# Patient Record
Sex: FEMALE | Race: WHITE | NOT HISPANIC OR LATINO | URBAN - METROPOLITAN AREA
[De-identification: names, ages, dates, MRNs, and addresses within clinical notes are randomized per-mention and may not be internally consistent; named-entity substitution may affect disease eponyms.]

---

## 2017-05-16 ENCOUNTER — OFFICE (AMBULATORY)
Dept: URBAN - METROPOLITAN AREA CLINIC 64 | Facility: CLINIC | Age: 77
End: 2017-05-16

## 2017-05-16 VITALS
HEART RATE: 57 BPM | WEIGHT: 192 LBS | DIASTOLIC BLOOD PRESSURE: 85 MMHG | HEIGHT: 66 IN | SYSTOLIC BLOOD PRESSURE: 142 MMHG

## 2017-05-16 DIAGNOSIS — R93.3 ABNORMAL FINDINGS ON DIAGNOSTIC IMAGING OF OTHER PARTS OF DI: ICD-10-CM

## 2017-05-16 DIAGNOSIS — R13.10 DYSPHAGIA, UNSPECIFIED: ICD-10-CM

## 2017-05-16 DIAGNOSIS — K21.9 GASTRO-ESOPHAGEAL REFLUX DISEASE WITHOUT ESOPHAGITIS: ICD-10-CM

## 2017-05-16 PROCEDURE — 99214 OFFICE O/P EST MOD 30 MIN: CPT | Performed by: INTERNAL MEDICINE

## 2017-05-16 RX ORDER — OMEPRAZOLE 40 MG/1
40 CAPSULE, DELAYED RELEASE ORAL
Qty: 30 | Refills: 11 | Status: COMPLETED
Start: 2017-05-16 | End: 2018-02-16

## 2017-05-31 ENCOUNTER — ON CAMPUS - OUTPATIENT (AMBULATORY)
Dept: URBAN - METROPOLITAN AREA HOSPITAL 2 | Facility: HOSPITAL | Age: 77
End: 2017-05-31

## 2017-05-31 ENCOUNTER — HOSPITAL ENCOUNTER (OUTPATIENT)
Dept: OTHER | Facility: HOSPITAL | Age: 77
Setting detail: SPECIMEN
Discharge: HOME | End: 2017-05-31
Attending: INTERNAL MEDICINE | Admitting: INTERNAL MEDICINE

## 2017-05-31 ENCOUNTER — OFFICE (AMBULATORY)
Dept: URBAN - METROPOLITAN AREA CLINIC 64 | Facility: CLINIC | Age: 77
End: 2017-05-31

## 2017-05-31 VITALS
OXYGEN SATURATION: 98 % | SYSTOLIC BLOOD PRESSURE: 146 MMHG | SYSTOLIC BLOOD PRESSURE: 121 MMHG | OXYGEN SATURATION: 99 % | HEART RATE: 72 BPM | RESPIRATION RATE: 18 BRPM | HEART RATE: 68 BPM | HEART RATE: 60 BPM | HEIGHT: 66 IN | HEART RATE: 54 BPM | DIASTOLIC BLOOD PRESSURE: 70 MMHG | OXYGEN SATURATION: 97 % | SYSTOLIC BLOOD PRESSURE: 148 MMHG | SYSTOLIC BLOOD PRESSURE: 130 MMHG | DIASTOLIC BLOOD PRESSURE: 79 MMHG | DIASTOLIC BLOOD PRESSURE: 66 MMHG | TEMPERATURE: 97.8 F | WEIGHT: 192 LBS | RESPIRATION RATE: 16 BRPM

## 2017-05-31 DIAGNOSIS — K20.9 ESOPHAGITIS, UNSPECIFIED: ICD-10-CM

## 2017-05-31 DIAGNOSIS — K21.9 GASTRO-ESOPHAGEAL REFLUX DISEASE WITHOUT ESOPHAGITIS: ICD-10-CM

## 2017-05-31 DIAGNOSIS — K44.9 DIAPHRAGMATIC HERNIA WITHOUT OBSTRUCTION OR GANGRENE: ICD-10-CM

## 2017-05-31 DIAGNOSIS — K31.9 DISEASE OF STOMACH AND DUODENUM, UNSPECIFIED: ICD-10-CM

## 2017-05-31 DIAGNOSIS — T18.128A FOOD IN ESOPHAGUS CAUSING OTHER INJURY, INITIAL ENCOUNTER: ICD-10-CM

## 2017-05-31 DIAGNOSIS — K29.50 UNSPECIFIED CHRONIC GASTRITIS WITHOUT BLEEDING: ICD-10-CM

## 2017-05-31 DIAGNOSIS — R13.10 DYSPHAGIA, UNSPECIFIED: ICD-10-CM

## 2017-05-31 DIAGNOSIS — K29.70 GASTRITIS, UNSPECIFIED, WITHOUT BLEEDING: ICD-10-CM

## 2017-05-31 LAB
GI HISTOLOGY: A. SELECT: (no result)
GI HISTOLOGY: PDF REPORT: (no result)

## 2017-05-31 PROCEDURE — 43239 EGD BIOPSY SINGLE/MULTIPLE: CPT | Performed by: INTERNAL MEDICINE

## 2017-05-31 PROCEDURE — 88342 IMHCHEM/IMCYTCHM 1ST ANTB: CPT | Mod: 26 | Performed by: INTERNAL MEDICINE

## 2017-05-31 PROCEDURE — 88305 TISSUE EXAM BY PATHOLOGIST: CPT | Mod: 26 | Performed by: INTERNAL MEDICINE

## 2017-05-31 PROCEDURE — 43450 DILATE ESOPHAGUS 1/MULT PASS: CPT | Performed by: INTERNAL MEDICINE

## 2017-05-31 RX ADMIN — PROPOFOL: 10 INJECTION, EMULSION INTRAVENOUS at 07:38

## 2017-08-07 ENCOUNTER — OFFICE (AMBULATORY)
Dept: URBAN - METROPOLITAN AREA CLINIC 64 | Facility: CLINIC | Age: 77
End: 2017-08-07

## 2017-08-07 VITALS
HEIGHT: 66 IN | HEART RATE: 47 BPM | WEIGHT: 182 LBS | SYSTOLIC BLOOD PRESSURE: 467 MMHG | DIASTOLIC BLOOD PRESSURE: 75 MMHG

## 2017-08-07 DIAGNOSIS — K21.9 GASTRO-ESOPHAGEAL REFLUX DISEASE WITHOUT ESOPHAGITIS: ICD-10-CM

## 2017-08-07 DIAGNOSIS — R13.10 DYSPHAGIA, UNSPECIFIED: ICD-10-CM

## 2017-08-07 PROCEDURE — 99213 OFFICE O/P EST LOW 20 MIN: CPT | Performed by: INTERNAL MEDICINE

## 2017-08-07 RX ORDER — OMEPRAZOLE 20 MG/1
20 CAPSULE, DELAYED RELEASE ORAL
Qty: 30 | Refills: 11 | Status: COMPLETED
Start: 2017-08-07 | End: 2018-02-16

## 2018-02-16 ENCOUNTER — OFFICE (AMBULATORY)
Dept: URBAN - METROPOLITAN AREA CLINIC 64 | Facility: CLINIC | Age: 78
End: 2018-02-16

## 2018-02-16 VITALS
DIASTOLIC BLOOD PRESSURE: 69 MMHG | HEIGHT: 66 IN | SYSTOLIC BLOOD PRESSURE: 138 MMHG | HEART RATE: 48 BPM | WEIGHT: 186 LBS

## 2018-02-16 DIAGNOSIS — K21.9 GASTRO-ESOPHAGEAL REFLUX DISEASE WITHOUT ESOPHAGITIS: ICD-10-CM

## 2018-02-16 DIAGNOSIS — R10.9 UNSPECIFIED ABDOMINAL PAIN: ICD-10-CM

## 2018-02-16 DIAGNOSIS — R19.5 OTHER FECAL ABNORMALITIES: ICD-10-CM

## 2018-02-16 PROCEDURE — 99214 OFFICE O/P EST MOD 30 MIN: CPT | Performed by: NURSE PRACTITIONER

## 2018-02-16 RX ORDER — SUCRALFATE 1 G/10ML
800 SUSPENSION ORAL
Qty: 400 | Refills: 0 | Status: COMPLETED
Start: 2018-02-16 | End: 2018-03-05

## 2018-02-16 RX ORDER — METRONIDAZOLE 500 MG/1
1500 TABLET, FILM COATED ORAL
Qty: 30 | Refills: 0 | Status: COMPLETED
Start: 2018-02-16 | End: 2018-03-05

## 2018-03-05 ENCOUNTER — OFFICE (AMBULATORY)
Dept: URBAN - METROPOLITAN AREA CLINIC 64 | Facility: CLINIC | Age: 78
End: 2018-03-05

## 2018-03-05 VITALS
WEIGHT: 186 LBS | HEIGHT: 66 IN | DIASTOLIC BLOOD PRESSURE: 73 MMHG | HEART RATE: 50 BPM | SYSTOLIC BLOOD PRESSURE: 142 MMHG

## 2018-03-05 DIAGNOSIS — K21.9 GASTRO-ESOPHAGEAL REFLUX DISEASE WITHOUT ESOPHAGITIS: ICD-10-CM

## 2018-03-05 DIAGNOSIS — R10.9 UNSPECIFIED ABDOMINAL PAIN: ICD-10-CM

## 2018-03-05 PROCEDURE — 99213 OFFICE O/P EST LOW 20 MIN: CPT | Performed by: NURSE PRACTITIONER

## 2018-03-05 RX ORDER — SUCRALFATE 1 G/1
4 TABLET ORAL
Qty: 28 | Refills: 11 | Status: COMPLETED
Start: 2018-02-16 | End: 2018-03-23

## 2018-03-05 RX ORDER — METRONIDAZOLE 500 MG/1
1500 TABLET, FILM COATED ORAL
Qty: 30 | Refills: 0 | Status: COMPLETED
Start: 2018-03-05 | End: 2018-04-04

## 2018-04-04 ENCOUNTER — OFFICE (AMBULATORY)
Dept: URBAN - METROPOLITAN AREA CLINIC 64 | Facility: CLINIC | Age: 78
End: 2018-04-04

## 2018-04-04 VITALS
DIASTOLIC BLOOD PRESSURE: 61 MMHG | WEIGHT: 186 LBS | HEIGHT: 66 IN | SYSTOLIC BLOOD PRESSURE: 128 MMHG | HEART RATE: 55 BPM

## 2018-04-04 DIAGNOSIS — R93.3 ABNORMAL FINDINGS ON DIAGNOSTIC IMAGING OF OTHER PARTS OF DI: ICD-10-CM

## 2018-04-04 DIAGNOSIS — K57.32 DIVERTICULITIS OF LARGE INTESTINE WITHOUT PERFORATION OR ABS: ICD-10-CM

## 2018-04-04 PROCEDURE — 99213 OFFICE O/P EST LOW 20 MIN: CPT | Performed by: INTERNAL MEDICINE

## 2018-04-04 RX ORDER — AMOXICILLIN AND CLAVULANATE POTASSIUM 875; 125 MG/1; MG/1
1750 TABLET, FILM COATED ORAL
Qty: 20 | Refills: 0 | Status: COMPLETED
Start: 2018-04-04 | End: 2018-05-15

## 2018-05-16 ENCOUNTER — HOSPITAL ENCOUNTER (OUTPATIENT)
Dept: OTHER | Facility: HOSPITAL | Age: 78
Setting detail: SPECIMEN
Discharge: HOME | End: 2018-05-16
Attending: INTERNAL MEDICINE | Admitting: INTERNAL MEDICINE

## 2018-05-16 ENCOUNTER — ON CAMPUS - OUTPATIENT (AMBULATORY)
Dept: URBAN - METROPOLITAN AREA HOSPITAL 2 | Facility: HOSPITAL | Age: 78
End: 2018-05-16

## 2018-05-16 ENCOUNTER — OFFICE (AMBULATORY)
Dept: URBAN - METROPOLITAN AREA PATHOLOGY 4 | Facility: PATHOLOGY | Age: 78
End: 2018-05-16

## 2018-05-16 VITALS
DIASTOLIC BLOOD PRESSURE: 67 MMHG | RESPIRATION RATE: 18 BRPM | OXYGEN SATURATION: 98 % | HEART RATE: 74 BPM | SYSTOLIC BLOOD PRESSURE: 135 MMHG | TEMPERATURE: 97.2 F | DIASTOLIC BLOOD PRESSURE: 83 MMHG | HEART RATE: 66 BPM | HEIGHT: 66 IN | HEART RATE: 60 BPM | OXYGEN SATURATION: 100 % | WEIGHT: 180 LBS | OXYGEN SATURATION: 93 % | RESPIRATION RATE: 14 BRPM | OXYGEN SATURATION: 96 % | SYSTOLIC BLOOD PRESSURE: 182 MMHG | DIASTOLIC BLOOD PRESSURE: 74 MMHG | RESPIRATION RATE: 16 BRPM | DIASTOLIC BLOOD PRESSURE: 78 MMHG | HEART RATE: 54 BPM | RESPIRATION RATE: 19 BRPM | DIASTOLIC BLOOD PRESSURE: 91 MMHG | HEART RATE: 46 BPM | SYSTOLIC BLOOD PRESSURE: 139 MMHG | SYSTOLIC BLOOD PRESSURE: 126 MMHG | SYSTOLIC BLOOD PRESSURE: 188 MMHG

## 2018-05-16 DIAGNOSIS — K29.50 UNSPECIFIED CHRONIC GASTRITIS WITHOUT BLEEDING: ICD-10-CM

## 2018-05-16 DIAGNOSIS — R93.3 ABNORMAL FINDINGS ON DIAGNOSTIC IMAGING OF OTHER PARTS OF DI: ICD-10-CM

## 2018-05-16 DIAGNOSIS — K20.8 OTHER ESOPHAGITIS: ICD-10-CM

## 2018-05-16 DIAGNOSIS — K29.80 DUODENITIS WITHOUT BLEEDING: ICD-10-CM

## 2018-05-16 DIAGNOSIS — K44.9 DIAPHRAGMATIC HERNIA WITHOUT OBSTRUCTION OR GANGRENE: ICD-10-CM

## 2018-05-16 DIAGNOSIS — K29.71 GASTRITIS, UNSPECIFIED, WITH BLEEDING: ICD-10-CM

## 2018-05-16 LAB
GI HISTOLOGY: A. SELECT: (no result)
GI HISTOLOGY: PDF REPORT: (no result)

## 2018-05-16 PROCEDURE — 88342 IMHCHEM/IMCYTCHM 1ST ANTB: CPT | Mod: 26 | Performed by: INTERNAL MEDICINE

## 2018-05-16 PROCEDURE — 88305 TISSUE EXAM BY PATHOLOGIST: CPT | Mod: 26 | Performed by: INTERNAL MEDICINE

## 2018-05-16 PROCEDURE — 43239 EGD BIOPSY SINGLE/MULTIPLE: CPT | Performed by: INTERNAL MEDICINE

## 2018-05-16 RX ORDER — OMEPRAZOLE 20 MG/1
CAPSULE, DELAYED RELEASE ORAL
Qty: 150 | Refills: 1 | Status: COMPLETED
End: 2019-10-21

## 2018-05-16 RX ADMIN — PROPOFOL: 10 INJECTION, EMULSION INTRAVENOUS at 14:29

## 2018-06-03 ENCOUNTER — ON CAMPUS - OUTPATIENT (AMBULATORY)
Dept: URBAN - METROPOLITAN AREA HOSPITAL 85 | Facility: HOSPITAL | Age: 78
End: 2018-06-03

## 2018-06-03 DIAGNOSIS — K59.00 CONSTIPATION, UNSPECIFIED: ICD-10-CM

## 2018-06-03 DIAGNOSIS — K57.32 DIVERTICULITIS OF LARGE INTESTINE WITHOUT PERFORATION OR ABS: ICD-10-CM

## 2018-06-03 DIAGNOSIS — R10.32 LEFT LOWER QUADRANT PAIN: ICD-10-CM

## 2018-06-03 DIAGNOSIS — K21.9 GASTRO-ESOPHAGEAL REFLUX DISEASE WITHOUT ESOPHAGITIS: ICD-10-CM

## 2018-06-03 DIAGNOSIS — K44.9 DIAPHRAGMATIC HERNIA WITHOUT OBSTRUCTION OR GANGRENE: ICD-10-CM

## 2018-06-03 PROCEDURE — 99214 OFFICE O/P EST MOD 30 MIN: CPT | Performed by: NURSE PRACTITIONER

## 2018-06-06 ENCOUNTER — OFFICE (AMBULATORY)
Dept: URBAN - METROPOLITAN AREA CLINIC 64 | Facility: CLINIC | Age: 78
End: 2018-06-06

## 2018-06-06 VITALS
HEIGHT: 66 IN | SYSTOLIC BLOOD PRESSURE: 161 MMHG | HEART RATE: 54 BPM | DIASTOLIC BLOOD PRESSURE: 81 MMHG | WEIGHT: 183 LBS

## 2018-06-06 DIAGNOSIS — K57.32 DIVERTICULITIS OF LARGE INTESTINE WITHOUT PERFORATION OR ABS: ICD-10-CM

## 2018-06-06 DIAGNOSIS — R10.32 LEFT LOWER QUADRANT PAIN: ICD-10-CM

## 2018-06-06 DIAGNOSIS — R11.0 NAUSEA: ICD-10-CM

## 2018-06-06 DIAGNOSIS — A04.72 ENTEROCOLITIS DUE TO CLOSTRIDIUM DIFFICILE, NOT SPECIFIED AS: ICD-10-CM

## 2018-06-06 PROCEDURE — 99214 OFFICE O/P EST MOD 30 MIN: CPT | Performed by: NURSE PRACTITIONER

## 2018-06-06 RX ORDER — ONDANSETRON HYDROCHLORIDE 4 MG/1
16 TABLET, ORALLY DISINTEGRATING ORAL
Qty: 45 | Refills: 0 | Status: COMPLETED
Start: 2018-06-06 | End: 2019-10-21

## 2018-07-16 ENCOUNTER — OFFICE (AMBULATORY)
Dept: URBAN - METROPOLITAN AREA CLINIC 64 | Facility: CLINIC | Age: 78
End: 2018-07-16

## 2018-07-16 VITALS
SYSTOLIC BLOOD PRESSURE: 187 MMHG | WEIGHT: 183 LBS | HEIGHT: 66 IN | DIASTOLIC BLOOD PRESSURE: 70 MMHG | HEART RATE: 50 BPM

## 2018-07-16 DIAGNOSIS — A04.72 ENTEROCOLITIS DUE TO CLOSTRIDIUM DIFFICILE, NOT SPECIFIED AS: ICD-10-CM

## 2018-07-16 DIAGNOSIS — K21.9 GASTRO-ESOPHAGEAL REFLUX DISEASE WITHOUT ESOPHAGITIS: ICD-10-CM

## 2018-07-16 DIAGNOSIS — K57.32 DIVERTICULITIS OF LARGE INTESTINE WITHOUT PERFORATION OR ABS: ICD-10-CM

## 2018-07-16 PROCEDURE — 99214 OFFICE O/P EST MOD 30 MIN: CPT | Performed by: NURSE PRACTITIONER

## 2018-09-17 ENCOUNTER — OFFICE (AMBULATORY)
Dept: URBAN - METROPOLITAN AREA PATHOLOGY 4 | Facility: PATHOLOGY | Age: 78
End: 2018-09-17

## 2018-09-17 ENCOUNTER — ON CAMPUS - OUTPATIENT (AMBULATORY)
Dept: URBAN - METROPOLITAN AREA HOSPITAL 2 | Facility: HOSPITAL | Age: 78
End: 2018-09-17

## 2018-09-17 ENCOUNTER — HOSPITAL ENCOUNTER (OUTPATIENT)
Dept: OTHER | Facility: HOSPITAL | Age: 78
Setting detail: SPECIMEN
Discharge: HOME | End: 2018-09-17
Attending: INTERNAL MEDICINE | Admitting: INTERNAL MEDICINE

## 2018-09-17 VITALS
SYSTOLIC BLOOD PRESSURE: 165 MMHG | HEART RATE: 63 BPM | DIASTOLIC BLOOD PRESSURE: 86 MMHG | DIASTOLIC BLOOD PRESSURE: 60 MMHG | TEMPERATURE: 97.1 F | DIASTOLIC BLOOD PRESSURE: 76 MMHG | HEIGHT: 66 IN | SYSTOLIC BLOOD PRESSURE: 142 MMHG | SYSTOLIC BLOOD PRESSURE: 175 MMHG | SYSTOLIC BLOOD PRESSURE: 145 MMHG | HEART RATE: 65 BPM | OXYGEN SATURATION: 97 % | RESPIRATION RATE: 14 BRPM | DIASTOLIC BLOOD PRESSURE: 77 MMHG | DIASTOLIC BLOOD PRESSURE: 78 MMHG | HEART RATE: 59 BPM | OXYGEN SATURATION: 99 % | SYSTOLIC BLOOD PRESSURE: 141 MMHG | OXYGEN SATURATION: 100 % | HEART RATE: 46 BPM | SYSTOLIC BLOOD PRESSURE: 159 MMHG | SYSTOLIC BLOOD PRESSURE: 152 MMHG | RESPIRATION RATE: 17 BRPM | WEIGHT: 180 LBS | RESPIRATION RATE: 16 BRPM | HEART RATE: 60 BPM | SYSTOLIC BLOOD PRESSURE: 160 MMHG | RESPIRATION RATE: 18 BRPM | SYSTOLIC BLOOD PRESSURE: 158 MMHG | HEART RATE: 43 BPM | DIASTOLIC BLOOD PRESSURE: 109 MMHG | DIASTOLIC BLOOD PRESSURE: 84 MMHG | OXYGEN SATURATION: 96 % | DIASTOLIC BLOOD PRESSURE: 87 MMHG

## 2018-09-17 DIAGNOSIS — D12.5 BENIGN NEOPLASM OF SIGMOID COLON: ICD-10-CM

## 2018-09-17 DIAGNOSIS — K63.89 OTHER SPECIFIED DISEASES OF INTESTINE: ICD-10-CM

## 2018-09-17 DIAGNOSIS — K57.30 DIVERTICULOSIS OF LARGE INTESTINE WITHOUT PERFORATION OR ABS: ICD-10-CM

## 2018-09-17 LAB
GI HISTOLOGY: A. UNSPECIFIED: (no result)
GI HISTOLOGY: PDF REPORT: (no result)

## 2018-09-17 PROCEDURE — 45385 COLONOSCOPY W/LESION REMOVAL: CPT | Performed by: INTERNAL MEDICINE

## 2018-09-17 PROCEDURE — 88305 TISSUE EXAM BY PATHOLOGIST: CPT | Mod: 26 | Performed by: INTERNAL MEDICINE

## 2018-10-01 ENCOUNTER — OFFICE (AMBULATORY)
Dept: URBAN - METROPOLITAN AREA CLINIC 64 | Facility: CLINIC | Age: 78
End: 2018-10-01

## 2018-10-01 VITALS
HEART RATE: 58 BPM | DIASTOLIC BLOOD PRESSURE: 82 MMHG | HEIGHT: 66 IN | WEIGHT: 184 LBS | SYSTOLIC BLOOD PRESSURE: 155 MMHG

## 2018-10-01 DIAGNOSIS — K57.30 DIVERTICULOSIS OF LARGE INTESTINE WITHOUT PERFORATION OR ABS: ICD-10-CM

## 2018-10-01 DIAGNOSIS — K21.9 GASTRO-ESOPHAGEAL REFLUX DISEASE WITHOUT ESOPHAGITIS: ICD-10-CM

## 2018-10-01 PROCEDURE — 99213 OFFICE O/P EST LOW 20 MIN: CPT | Performed by: INTERNAL MEDICINE

## 2019-03-25 ENCOUNTER — HOSPITAL ENCOUNTER (OUTPATIENT)
Dept: LAB | Facility: HOSPITAL | Age: 79
Discharge: HOME | End: 2019-03-25
Attending: FAMILY MEDICINE | Admitting: FAMILY MEDICINE

## 2019-03-25 LAB
ALBUMIN SERPL-MCNC: 4.2 G/DL (ref 3.5–4.8)
ALBUMIN/GLOB SERPL: 1.4 {RATIO} (ref 1–1.7)
ALP SERPL-CCNC: 52 IU/L (ref 32–91)
ALT SERPL-CCNC: 24 IU/L (ref 14–54)
ANION GAP SERPL CALC-SCNC: 15.6 MMOL/L (ref 10–20)
AST SERPL-CCNC: 26 IU/L (ref 15–41)
BILIRUB SERPL-MCNC: 0.9 MG/DL (ref 0.3–1.2)
BUN SERPL-MCNC: 23 MG/DL (ref 8–20)
BUN/CREAT SERPL: 25.6 (ref 5.4–26.2)
CALCIUM SERPL-MCNC: 10.3 MG/DL (ref 8.9–10.3)
CHLORIDE SERPL-SCNC: 102 MMOL/L (ref 101–111)
CONV CO2: 23 MMOL/L (ref 22–32)
CONV TOTAL PROTEIN: 7.3 G/DL (ref 6.1–7.9)
CREAT UR-MCNC: 0.9 MG/DL (ref 0.4–1)
GLOBULIN UR ELPH-MCNC: 3.1 G/DL (ref 2.5–3.8)
GLUCOSE SERPL-MCNC: 109 MG/DL (ref 65–99)
POTASSIUM SERPL-SCNC: 4.6 MMOL/L (ref 3.6–5.1)
SODIUM SERPL-SCNC: 136 MMOL/L (ref 136–144)

## 2019-04-23 ENCOUNTER — CONVERSION ENCOUNTER (OUTPATIENT)
Dept: CARDIOLOGY | Facility: CLINIC | Age: 79
End: 2019-04-23

## 2019-06-04 VITALS
OXYGEN SATURATION: 98 % | BODY MASS INDEX: 29.21 KG/M2 | DIASTOLIC BLOOD PRESSURE: 75 MMHG | WEIGHT: 181 LBS | SYSTOLIC BLOOD PRESSURE: 164 MMHG | HEART RATE: 46 BPM

## 2019-07-01 ENCOUNTER — TELEPHONE (OUTPATIENT)
Dept: CARDIOLOGY | Facility: CLINIC | Age: 79
End: 2019-07-01

## 2019-08-05 ENCOUNTER — TRANSCRIBE ORDERS (OUTPATIENT)
Dept: ADMINISTRATIVE | Facility: HOSPITAL | Age: 79
End: 2019-08-05

## 2019-08-05 ENCOUNTER — LAB (OUTPATIENT)
Dept: LAB | Facility: HOSPITAL | Age: 79
End: 2019-08-05

## 2019-08-05 ENCOUNTER — HOSPITAL ENCOUNTER (OUTPATIENT)
Dept: CARDIOLOGY | Facility: HOSPITAL | Age: 79
Discharge: HOME | End: 2019-08-05
Admitting: SURGERY

## 2019-08-05 DIAGNOSIS — Z01.810 PRE-OPERATIVE CARDIOVASCULAR EXAMINATION: ICD-10-CM

## 2019-08-05 DIAGNOSIS — E87.8 ELECTROLYTE IMBALANCE: ICD-10-CM

## 2019-08-05 DIAGNOSIS — E87.8 ELECTROLYTE IMBALANCE: Primary | ICD-10-CM

## 2019-08-05 LAB — POTASSIUM BLD-SCNC: 4.2 MMOL/L (ref 3.6–5.1)

## 2019-08-05 PROCEDURE — 36415 COLL VENOUS BLD VENIPUNCTURE: CPT

## 2019-08-05 PROCEDURE — 84132 ASSAY OF SERUM POTASSIUM: CPT

## 2019-08-05 PROCEDURE — 93005 ELECTROCARDIOGRAM TRACING: CPT | Performed by: SURGERY

## 2019-08-10 PROCEDURE — 93010 ELECTROCARDIOGRAM REPORT: CPT | Performed by: INTERNAL MEDICINE

## 2019-09-18 ENCOUNTER — LAB (OUTPATIENT)
Dept: LAB | Facility: HOSPITAL | Age: 79
End: 2019-09-18

## 2019-09-18 ENCOUNTER — TRANSCRIBE ORDERS (OUTPATIENT)
Dept: ADMINISTRATIVE | Facility: HOSPITAL | Age: 79
End: 2019-09-18

## 2019-09-18 ENCOUNTER — HOSPITAL ENCOUNTER (OUTPATIENT)
Dept: CARDIOLOGY | Facility: HOSPITAL | Age: 79
Discharge: HOME | End: 2019-09-18
Admitting: SURGERY

## 2019-09-18 DIAGNOSIS — E87.8 ELECTROLYTE IMBALANCE: ICD-10-CM

## 2019-09-18 DIAGNOSIS — Z01.810 PRE-OPERATIVE CARDIOVASCULAR EXAMINATION: Primary | ICD-10-CM

## 2019-09-18 DIAGNOSIS — Z01.810 PRE-OPERATIVE CARDIOVASCULAR EXAMINATION: ICD-10-CM

## 2019-09-18 LAB — POTASSIUM BLD-SCNC: 4.2 MMOL/L (ref 3.6–5.1)

## 2019-09-18 PROCEDURE — 93005 ELECTROCARDIOGRAM TRACING: CPT | Performed by: SURGERY

## 2019-09-18 PROCEDURE — 84132 ASSAY OF SERUM POTASSIUM: CPT

## 2019-09-18 PROCEDURE — 36415 COLL VENOUS BLD VENIPUNCTURE: CPT

## 2019-09-25 PROCEDURE — 93010 ELECTROCARDIOGRAM REPORT: CPT | Performed by: INTERNAL MEDICINE

## 2019-10-21 ENCOUNTER — OFFICE (AMBULATORY)
Dept: URBAN - METROPOLITAN AREA CLINIC 64 | Facility: CLINIC | Age: 79
End: 2019-10-21

## 2019-10-21 VITALS
HEIGHT: 66 IN | WEIGHT: 185 LBS | DIASTOLIC BLOOD PRESSURE: 76 MMHG | HEART RATE: 50 BPM | SYSTOLIC BLOOD PRESSURE: 143 MMHG

## 2019-10-21 DIAGNOSIS — R07.9 CHEST PAIN, UNSPECIFIED: ICD-10-CM

## 2019-10-21 DIAGNOSIS — K21.9 GASTRO-ESOPHAGEAL REFLUX DISEASE WITHOUT ESOPHAGITIS: ICD-10-CM

## 2019-10-21 DIAGNOSIS — E11.9 TYPE 2 DIABETES MELLITUS WITHOUT COMPLICATIONS: ICD-10-CM

## 2019-10-21 DIAGNOSIS — I10 ESSENTIAL (PRIMARY) HYPERTENSION: ICD-10-CM

## 2019-10-21 PROCEDURE — 99213 OFFICE O/P EST LOW 20 MIN: CPT | Performed by: INTERNAL MEDICINE

## 2019-11-12 ENCOUNTER — HOSPITAL ENCOUNTER (INPATIENT)
Facility: HOSPITAL | Age: 79
LOS: 2 days | Discharge: HOME | End: 2019-11-15
Attending: EMERGENCY MEDICINE | Admitting: FAMILY MEDICINE

## 2019-11-12 ENCOUNTER — APPOINTMENT (OUTPATIENT)
Dept: CT IMAGING | Facility: HOSPITAL | Age: 79
End: 2019-11-12

## 2019-11-12 DIAGNOSIS — E87.1 HYPONATREMIA: ICD-10-CM

## 2019-11-12 DIAGNOSIS — R10.9 ABDOMINAL PAIN, UNSPECIFIED ABDOMINAL LOCATION: Primary | ICD-10-CM

## 2019-11-12 DIAGNOSIS — K57.92 DIVERTICULITIS: ICD-10-CM

## 2019-11-12 LAB
ALBUMIN SERPL-MCNC: 4 G/DL (ref 3.5–5.2)
ALBUMIN/GLOB SERPL: 1.1 G/DL
ALP SERPL-CCNC: 70 U/L (ref 39–117)
ALT SERPL W P-5'-P-CCNC: 11 U/L (ref 1–33)
ANION GAP SERPL CALCULATED.3IONS-SCNC: 13 MMOL/L (ref 5–15)
AST SERPL-CCNC: 19 U/L (ref 1–32)
BACTERIA UR QL AUTO: ABNORMAL /HPF
BASOPHILS # BLD AUTO: 0.1 10*3/MM3 (ref 0–0.2)
BASOPHILS NFR BLD AUTO: 0.6 % (ref 0–1.5)
BILIRUB SERPL-MCNC: 0.7 MG/DL (ref 0.2–1.2)
BILIRUB UR QL STRIP: NEGATIVE
BUN BLD-MCNC: 24 MG/DL (ref 8–23)
BUN/CREAT SERPL: 26.4 (ref 7–25)
CALCIUM SPEC-SCNC: 10.3 MG/DL (ref 8.6–10.5)
CHLORIDE SERPL-SCNC: 89 MMOL/L (ref 98–107)
CLARITY UR: CLEAR
CO2 SERPL-SCNC: 25 MMOL/L (ref 22–29)
COLOR UR: YELLOW
CREAT BLD-MCNC: 0.91 MG/DL (ref 0.57–1)
DEPRECATED RDW RBC AUTO: 45.5 FL (ref 37–54)
EOSINOPHIL # BLD AUTO: 0.2 10*3/MM3 (ref 0–0.4)
EOSINOPHIL NFR BLD AUTO: 1.5 % (ref 0.3–6.2)
ERYTHROCYTE [DISTWIDTH] IN BLOOD BY AUTOMATED COUNT: 13.3 % (ref 12.3–15.4)
GFR SERPL CREATININE-BSD FRML MDRD: 59 ML/MIN/1.73
GLOBULIN UR ELPH-MCNC: 3.6 GM/DL
GLUCOSE BLD-MCNC: 118 MG/DL (ref 65–99)
GLUCOSE UR STRIP-MCNC: NEGATIVE MG/DL
HCT VFR BLD AUTO: 38.6 % (ref 34–46.6)
HGB BLD-MCNC: 13.3 G/DL (ref 12–15.9)
HGB UR QL STRIP.AUTO: NEGATIVE
HOLD SPECIMEN: NORMAL
HOLD SPECIMEN: NORMAL
HYALINE CASTS UR QL AUTO: ABNORMAL /LPF
KETONES UR QL STRIP: NEGATIVE
LEUKOCYTE ESTERASE UR QL STRIP.AUTO: ABNORMAL
LIPASE SERPL-CCNC: 45 U/L (ref 13–60)
LYMPHOCYTES # BLD AUTO: 1.1 10*3/MM3 (ref 0.7–3.1)
LYMPHOCYTES NFR BLD AUTO: 9.9 % (ref 19.6–45.3)
MCH RBC QN AUTO: 32.9 PG (ref 26.6–33)
MCHC RBC AUTO-ENTMCNC: 34.3 G/DL (ref 31.5–35.7)
MCV RBC AUTO: 95.7 FL (ref 79–97)
MONOCYTES # BLD AUTO: 1 10*3/MM3 (ref 0.1–0.9)
MONOCYTES NFR BLD AUTO: 8.8 % (ref 5–12)
NEUTROPHILS # BLD AUTO: 8.6 10*3/MM3 (ref 1.7–7)
NEUTROPHILS NFR BLD AUTO: 79.2 % (ref 42.7–76)
NITRITE UR QL STRIP: NEGATIVE
NRBC BLD AUTO-RTO: 0 /100 WBC (ref 0–0.2)
PH UR STRIP.AUTO: 7 [PH] (ref 5–8)
PLATELET # BLD AUTO: 367 10*3/MM3 (ref 140–450)
PMV BLD AUTO: 8.7 FL (ref 6–12)
POTASSIUM BLD-SCNC: 4.3 MMOL/L (ref 3.5–5.2)
PROT SERPL-MCNC: 7.6 G/DL (ref 6–8.5)
PROT UR QL STRIP: NEGATIVE
RBC # BLD AUTO: 4.04 10*6/MM3 (ref 3.77–5.28)
RBC # UR: ABNORMAL /HPF
REF LAB TEST METHOD: ABNORMAL
SODIUM BLD-SCNC: 127 MMOL/L (ref 136–145)
SP GR UR STRIP: 1.01 (ref 1–1.03)
SQUAMOUS #/AREA URNS HPF: ABNORMAL /HPF
UROBILINOGEN UR QL STRIP: ABNORMAL
WBC NRBC COR # BLD: 10.9 10*3/MM3 (ref 3.4–10.8)
WBC UR QL AUTO: ABNORMAL /HPF
WHOLE BLOOD HOLD SPECIMEN: NORMAL
WHOLE BLOOD HOLD SPECIMEN: NORMAL

## 2019-11-12 PROCEDURE — 25010000002 PIPERACILLIN SOD-TAZOBACTAM PER 1 G: Performed by: EMERGENCY MEDICINE

## 2019-11-12 PROCEDURE — G0378 HOSPITAL OBSERVATION PER HR: HCPCS

## 2019-11-12 PROCEDURE — 85025 COMPLETE CBC W/AUTO DIFF WBC: CPT | Performed by: EMERGENCY MEDICINE

## 2019-11-12 PROCEDURE — 99284 EMERGENCY DEPT VISIT MOD MDM: CPT

## 2019-11-12 PROCEDURE — 81001 URINALYSIS AUTO W/SCOPE: CPT | Performed by: EMERGENCY MEDICINE

## 2019-11-12 PROCEDURE — 74176 CT ABD & PELVIS W/O CONTRAST: CPT

## 2019-11-12 PROCEDURE — 25010000002 HYDROMORPHONE PER 4 MG: Performed by: EMERGENCY MEDICINE

## 2019-11-12 PROCEDURE — 25010000002 ONDANSETRON PER 1 MG: Performed by: EMERGENCY MEDICINE

## 2019-11-12 PROCEDURE — 83690 ASSAY OF LIPASE: CPT | Performed by: EMERGENCY MEDICINE

## 2019-11-12 PROCEDURE — 80053 COMPREHEN METABOLIC PANEL: CPT | Performed by: EMERGENCY MEDICINE

## 2019-11-12 RX ORDER — HYDROMORPHONE HCL 110MG/55ML
0.5 PATIENT CONTROLLED ANALGESIA SYRINGE INTRAVENOUS ONCE
Status: COMPLETED | OUTPATIENT
Start: 2019-11-12 | End: 2019-11-12

## 2019-11-12 RX ORDER — SODIUM CHLORIDE 0.9 % (FLUSH) 0.9 %
10 SYRINGE (ML) INJECTION AS NEEDED
Status: DISCONTINUED | OUTPATIENT
Start: 2019-11-12 | End: 2019-11-15 | Stop reason: HOSPADM

## 2019-11-12 RX ORDER — ONDANSETRON 2 MG/ML
4 INJECTION INTRAMUSCULAR; INTRAVENOUS ONCE
Status: COMPLETED | OUTPATIENT
Start: 2019-11-12 | End: 2019-11-12

## 2019-11-12 RX ADMIN — HYDROMORPHONE HYDROCHLORIDE 0.5 MG: 2 INJECTION, SOLUTION INTRAMUSCULAR; INTRAVENOUS; SUBCUTANEOUS at 21:14

## 2019-11-12 RX ADMIN — ONDANSETRON 4 MG: 2 INJECTION INTRAMUSCULAR; INTRAVENOUS at 21:13

## 2019-11-12 RX ADMIN — PIPERACILLIN SODIUM,TAZOBACTAM SODIUM 3.38 G: 3; .375 INJECTION, POWDER, FOR SOLUTION INTRAVENOUS at 23:34

## 2019-11-12 RX ADMIN — SODIUM CHLORIDE 500 ML: 0.9 INJECTION, SOLUTION INTRAVENOUS at 23:35

## 2019-11-13 LAB
ANION GAP SERPL CALCULATED.3IONS-SCNC: 12 MMOL/L (ref 5–15)
BASOPHILS # BLD AUTO: 0 10*3/MM3 (ref 0–0.2)
BASOPHILS NFR BLD AUTO: 0.4 % (ref 0–1.5)
BILIRUB UR QL STRIP: NEGATIVE
BUN BLD-MCNC: 17 MG/DL (ref 8–23)
BUN/CREAT SERPL: 23.9 (ref 7–25)
CALCIUM SPEC-SCNC: 9.4 MG/DL (ref 8.6–10.5)
CHLORIDE SERPL-SCNC: 98 MMOL/L (ref 98–107)
CLARITY UR: CLEAR
CO2 SERPL-SCNC: 22 MMOL/L (ref 22–29)
COLOR UR: YELLOW
CREAT BLD-MCNC: 0.71 MG/DL (ref 0.57–1)
DEPRECATED RDW RBC AUTO: 45.1 FL (ref 37–54)
EOSINOPHIL # BLD AUTO: 0.1 10*3/MM3 (ref 0–0.4)
EOSINOPHIL NFR BLD AUTO: 1.1 % (ref 0.3–6.2)
ERYTHROCYTE [DISTWIDTH] IN BLOOD BY AUTOMATED COUNT: 13.3 % (ref 12.3–15.4)
GFR SERPL CREATININE-BSD FRML MDRD: 79 ML/MIN/1.73
GLUCOSE BLD-MCNC: 97 MG/DL (ref 65–99)
GLUCOSE UR STRIP-MCNC: NEGATIVE MG/DL
HCT VFR BLD AUTO: 34.5 % (ref 34–46.6)
HGB BLD-MCNC: 12 G/DL (ref 12–15.9)
HGB UR QL STRIP.AUTO: NEGATIVE
KETONES UR QL STRIP: NEGATIVE
LEUKOCYTE ESTERASE UR QL STRIP.AUTO: NEGATIVE
LYMPHOCYTES # BLD AUTO: 1.4 10*3/MM3 (ref 0.7–3.1)
LYMPHOCYTES NFR BLD AUTO: 13.4 % (ref 19.6–45.3)
MCH RBC QN AUTO: 33.4 PG (ref 26.6–33)
MCHC RBC AUTO-ENTMCNC: 34.6 G/DL (ref 31.5–35.7)
MCV RBC AUTO: 96.5 FL (ref 79–97)
MONOCYTES # BLD AUTO: 1.3 10*3/MM3 (ref 0.1–0.9)
MONOCYTES NFR BLD AUTO: 12.4 % (ref 5–12)
NEUTROPHILS # BLD AUTO: 7.4 10*3/MM3 (ref 1.7–7)
NEUTROPHILS NFR BLD AUTO: 72.7 % (ref 42.7–76)
NITRITE UR QL STRIP: NEGATIVE
NRBC BLD AUTO-RTO: 0.1 /100 WBC (ref 0–0.2)
PH UR STRIP.AUTO: 6.5 [PH] (ref 5–8)
PLATELET # BLD AUTO: 303 10*3/MM3 (ref 140–450)
PMV BLD AUTO: 7.9 FL (ref 6–12)
POTASSIUM BLD-SCNC: 4.1 MMOL/L (ref 3.5–5.2)
PROT UR QL STRIP: NEGATIVE
RBC # BLD AUTO: 3.58 10*6/MM3 (ref 3.77–5.28)
SODIUM BLD-SCNC: 132 MMOL/L (ref 136–145)
SP GR UR STRIP: 1.01 (ref 1–1.03)
UROBILINOGEN UR QL STRIP: NORMAL
WBC NRBC COR # BLD: 10.1 10*3/MM3 (ref 3.4–10.8)

## 2019-11-13 PROCEDURE — 25010000002 HEPARIN (PORCINE) PER 1000 UNITS: Performed by: FAMILY MEDICINE

## 2019-11-13 PROCEDURE — 80048 BASIC METABOLIC PNL TOTAL CA: CPT | Performed by: FAMILY MEDICINE

## 2019-11-13 PROCEDURE — 81003 URINALYSIS AUTO W/O SCOPE: CPT | Performed by: FAMILY MEDICINE

## 2019-11-13 PROCEDURE — 25010000002 PIPERACILLIN SOD-TAZOBACTAM PER 1 G: Performed by: FAMILY MEDICINE

## 2019-11-13 PROCEDURE — 85025 COMPLETE CBC W/AUTO DIFF WBC: CPT | Performed by: FAMILY MEDICINE

## 2019-11-13 RX ORDER — LEVOFLOXACIN 750 MG/1
750 TABLET ORAL DAILY
COMMUNITY
Start: 2019-11-12 | End: 2020-09-08

## 2019-11-13 RX ORDER — METRONIDAZOLE 500 MG/1
500 TABLET ORAL 3 TIMES DAILY
Status: DISCONTINUED | OUTPATIENT
Start: 2019-11-13 | End: 2019-11-13

## 2019-11-13 RX ORDER — POLYETHYLENE GLYCOL 3350 17 G/17G
17 POWDER, FOR SOLUTION ORAL DAILY PRN
COMMUNITY
End: 2023-03-20

## 2019-11-13 RX ORDER — MAGNESIUM SULFATE HEPTAHYDRATE 40 MG/ML
4 INJECTION, SOLUTION INTRAVENOUS AS NEEDED
Status: DISCONTINUED | OUTPATIENT
Start: 2019-11-13 | End: 2019-11-15 | Stop reason: HOSPADM

## 2019-11-13 RX ORDER — BISACODYL 10 MG
10 SUPPOSITORY, RECTAL RECTAL DAILY PRN
Status: DISCONTINUED | OUTPATIENT
Start: 2019-11-13 | End: 2019-11-15 | Stop reason: HOSPADM

## 2019-11-13 RX ORDER — HEPARIN SODIUM 5000 [USP'U]/ML
5000 INJECTION, SOLUTION INTRAVENOUS; SUBCUTANEOUS EVERY 8 HOURS SCHEDULED
Status: DISCONTINUED | OUTPATIENT
Start: 2019-11-13 | End: 2019-11-15 | Stop reason: HOSPADM

## 2019-11-13 RX ORDER — ASPIRIN 81 MG/1
81 TABLET ORAL DAILY
COMMUNITY
End: 2021-09-23

## 2019-11-13 RX ORDER — SODIUM CHLORIDE 0.9 % (FLUSH) 0.9 %
10 SYRINGE (ML) INJECTION EVERY 12 HOURS SCHEDULED
Status: DISCONTINUED | OUTPATIENT
Start: 2019-11-13 | End: 2019-11-15 | Stop reason: HOSPADM

## 2019-11-13 RX ORDER — ACETAMINOPHEN 650 MG/1
650 SUPPOSITORY RECTAL EVERY 4 HOURS PRN
Status: DISCONTINUED | OUTPATIENT
Start: 2019-11-13 | End: 2019-11-15 | Stop reason: HOSPADM

## 2019-11-13 RX ORDER — MULTIPLE VITAMINS W/ MINERALS TAB 9MG-400MCG
1 TAB ORAL DAILY
COMMUNITY
End: 2020-09-08

## 2019-11-13 RX ORDER — ATENOLOL 25 MG/1
25 TABLET ORAL EVERY 12 HOURS SCHEDULED
Status: DISCONTINUED | OUTPATIENT
Start: 2019-11-13 | End: 2019-11-15 | Stop reason: HOSPADM

## 2019-11-13 RX ORDER — MAGNESIUM SULFATE HEPTAHYDRATE 40 MG/ML
2 INJECTION, SOLUTION INTRAVENOUS AS NEEDED
Status: DISCONTINUED | OUTPATIENT
Start: 2019-11-13 | End: 2019-11-15 | Stop reason: HOSPADM

## 2019-11-13 RX ORDER — ACETAMINOPHEN 325 MG/1
650 TABLET ORAL EVERY 4 HOURS PRN
Status: DISCONTINUED | OUTPATIENT
Start: 2019-11-13 | End: 2019-11-15 | Stop reason: HOSPADM

## 2019-11-13 RX ORDER — POTASSIUM CHLORIDE 1.5 G/1.77G
40 POWDER, FOR SOLUTION ORAL AS NEEDED
Status: DISCONTINUED | OUTPATIENT
Start: 2019-11-13 | End: 2019-11-15 | Stop reason: HOSPADM

## 2019-11-13 RX ORDER — CORTISONE ACETATE 25 MG/1
25 TABLET ORAL DAILY
COMMUNITY
End: 2020-09-08

## 2019-11-13 RX ORDER — LEVOTHYROXINE SODIUM 0.05 MG/1
50 TABLET ORAL
Status: DISCONTINUED | OUTPATIENT
Start: 2019-11-13 | End: 2019-11-15 | Stop reason: HOSPADM

## 2019-11-13 RX ORDER — DOCUSATE SODIUM 100 MG/1
300 CAPSULE, LIQUID FILLED ORAL DAILY
COMMUNITY
End: 2021-01-12

## 2019-11-13 RX ORDER — LEVOTHYROXINE SODIUM 0.05 MG/1
50 TABLET ORAL NIGHTLY
COMMUNITY
End: 2023-03-26 | Stop reason: SDUPTHER

## 2019-11-13 RX ORDER — SODIUM CHLORIDE 9 MG/ML
100 INJECTION, SOLUTION INTRAVENOUS CONTINUOUS
Status: DISCONTINUED | OUTPATIENT
Start: 2019-11-13 | End: 2019-11-14

## 2019-11-13 RX ORDER — SODIUM CHLORIDE 0.9 % (FLUSH) 0.9 %
10 SYRINGE (ML) INJECTION AS NEEDED
Status: DISCONTINUED | OUTPATIENT
Start: 2019-11-13 | End: 2019-11-15 | Stop reason: HOSPADM

## 2019-11-13 RX ORDER — ASPIRIN 81 MG/1
81 TABLET ORAL DAILY
Status: DISCONTINUED | OUTPATIENT
Start: 2019-11-13 | End: 2019-11-15 | Stop reason: HOSPADM

## 2019-11-13 RX ORDER — ACETAMINOPHEN 160 MG/5ML
650 SOLUTION ORAL EVERY 4 HOURS PRN
Status: DISCONTINUED | OUTPATIENT
Start: 2019-11-13 | End: 2019-11-15 | Stop reason: HOSPADM

## 2019-11-13 RX ORDER — POTASSIUM CHLORIDE 20 MEQ/1
40 TABLET, EXTENDED RELEASE ORAL AS NEEDED
Status: DISCONTINUED | OUTPATIENT
Start: 2019-11-13 | End: 2019-11-15 | Stop reason: HOSPADM

## 2019-11-13 RX ORDER — ACEBUTOLOL HYDROCHLORIDE 200 MG/1
200 CAPSULE ORAL DAILY
COMMUNITY
End: 2023-03-20 | Stop reason: SDUPTHER

## 2019-11-13 RX ORDER — DOCUSATE SODIUM 100 MG/1
100 CAPSULE, LIQUID FILLED ORAL 2 TIMES DAILY
Status: DISCONTINUED | OUTPATIENT
Start: 2019-11-13 | End: 2019-11-15 | Stop reason: HOSPADM

## 2019-11-13 RX ORDER — LANOLIN ALCOHOL/MO/W.PET/CERES
1000 CREAM (GRAM) TOPICAL DAILY
COMMUNITY
End: 2020-09-08

## 2019-11-13 RX ORDER — DEXAMETHASONE 0.5 MG/1
0.5 TABLET ORAL
Status: DISCONTINUED | OUTPATIENT
Start: 2019-11-13 | End: 2019-11-15 | Stop reason: HOSPADM

## 2019-11-13 RX ORDER — LANOLIN ALCOHOL/MO/W.PET/CERES
1000 CREAM (GRAM) TOPICAL DAILY
Status: DISCONTINUED | OUTPATIENT
Start: 2019-11-13 | End: 2019-11-15 | Stop reason: HOSPADM

## 2019-11-13 RX ORDER — METRONIDAZOLE 500 MG/1
500 TABLET ORAL 3 TIMES DAILY
COMMUNITY
Start: 2019-11-12 | End: 2020-09-08

## 2019-11-13 RX ADMIN — ASPIRIN 81 MG: 81 TABLET, COATED ORAL at 11:00

## 2019-11-13 RX ADMIN — ATENOLOL 25 MG: 25 TABLET ORAL at 11:00

## 2019-11-13 RX ADMIN — DEXAMETHASONE 0.5 MG: 0.5 TABLET ORAL at 17:47

## 2019-11-13 RX ADMIN — SODIUM CHLORIDE 100 ML/HR: 900 INJECTION, SOLUTION INTRAVENOUS at 10:59

## 2019-11-13 RX ADMIN — LOSARTAN POTASSIUM: 50 TABLET, FILM COATED ORAL at 11:00

## 2019-11-13 RX ADMIN — HEPARIN SODIUM 5000 UNITS: 5000 INJECTION INTRAVENOUS; SUBCUTANEOUS at 15:29

## 2019-11-13 RX ADMIN — PIPERACILLIN AND TAZOBACTAM 3.38 G: 3; .375 INJECTION, POWDER, LYOPHILIZED, FOR SOLUTION INTRAVENOUS at 10:59

## 2019-11-13 RX ADMIN — DOCUSATE SODIUM 100 MG: 100 CAPSULE, LIQUID FILLED ORAL at 20:55

## 2019-11-13 RX ADMIN — CYANOCOBALAMIN TAB 1000 MCG 1000 MCG: 1000 TAB at 11:00

## 2019-11-13 RX ADMIN — HEPARIN SODIUM 5000 UNITS: 5000 INJECTION INTRAVENOUS; SUBCUTANEOUS at 21:00

## 2019-11-13 RX ADMIN — DOCUSATE SODIUM 100 MG: 100 CAPSULE, LIQUID FILLED ORAL at 11:00

## 2019-11-13 RX ADMIN — DEXAMETHASONE 0.5 MG: 0.5 TABLET ORAL at 12:33

## 2019-11-13 RX ADMIN — Medication 10 ML: at 20:55

## 2019-11-13 RX ADMIN — Medication 10 ML: at 11:01

## 2019-11-13 RX ADMIN — PIPERACILLIN AND TAZOBACTAM 3.38 G: 3; .375 INJECTION, POWDER, LYOPHILIZED, FOR SOLUTION INTRAVENOUS at 17:30

## 2019-11-13 NOTE — PROGRESS NOTES
Discharge Planning Assessment  [facility]     Patient Name: Kingsley Hunt  MRN: [id number]  Today's Date: 11/13/2019    Admit Date: 11/12/2019    Discharge Needs Assessment     Row Name 11/13/19 1246       Living Environment    Lives With  alone    Current Living Arrangements  home/apartment/condo    Duration at Residence  House in [place] but stays at times in apt in [place].     Primary Care Provided by  self    Caregiving Concerns  Plans to move in with his mother here in [place].    Family Caregiver if Needed  none    Able to Return to Prior Arrangements  yes       Resource/Environmental Concerns    Resource/Environmental Concerns  none       Transition Planning    Patient/Family Anticipates Transition to  home    Patient/Family Anticipated Services at Transition  — Pt unaware that he has insurance, Medicaid. VM left for insurance verification. Pt states may be error, his father has same name.       Discharge Needs Assessment    Readmission Within the Last 30 Days  no previous admission in last 30 days    Concerns Comments  Possible need for help with cost of DC meds if uninsured.    Equipment Currently Used at Home  nebulizer    Anticipated Changes Related to Illness  none    Equipment Needed After Discharge  — No home O2.    Discharge Coordination/Progress  Return home, may need help with cost of scripts if uninsured.         Discharge Plan     Row Name 11/13/19 1251       Plan    Plan  Return home, may need help with cost of scripts if uninsured.     Plan Comments  Pt is unaware he has insurance coverage. His father has same name - insurance verification informed per VM.                  Demographic Summary     Row Name 11/13/19 1245       General Information    Admission Type  inpatient    Arrived From  emergency department    Referral Source  admission list    Reason for Consult  discharge planning    Preferred Language  English                   Asim Wong RN

## 2019-11-13 NOTE — H&P
History and Physical      Patient Care Team:  Elin Galo MD as PCP - General    Chief complaint increasing abdominal pain    Subjective     Patient is a 79 y.o. female presents with increasing severity of abdominal pain.  She was originally seen by her primary care and was started empirically on levofloxacin and metronidazole.  She had 1 dose but her pain increased so she came the emergency department and was subsequently admitted CAT scan was positive for sigmoid diverticulitis.  When I see her this morning she has not had any nausea or vomiting since admission although she was nauseated on admission.  She not really getting hungry.  Her pain has improved to a mild/moderate currently this morning she rates it about a 4-6 out of 10.  When she was admitted was probably an 8 9 out of 10.  Patient admits that she is also constipated times as well.  She denies any fevers.  Review of Systems   All systems were reviewed and negative except for: Currently with mild to moderate crampy abdominal pain in the suprapubic into the left lower quadrant area abdominal pain.    History  Past Medical History:   Diagnosis Date   • Arthritis    • Cancer (CMS/HCC)     Skin Cancer   • Disease of thyroid gland    • Elevated cholesterol    • GERD (gastroesophageal reflux disease)    • Hypertension      Past Surgical History:   Procedure Laterality Date   • APPENDECTOMY     • COLONOSCOPY     • ENDOSCOPY     • EYE SURGERY     • SKIN BIOPSY       Family History   Problem Relation Age of Onset   • Cancer Mother    • Diabetes Sister    • Heart disease Sister    • Diabetes Son      Social History     Tobacco Use   • Smoking status: Former Smoker   • Smokeless tobacco: Never Used   • Tobacco comment: Quit 30+ years ago   Substance Use Topics   • Alcohol use: Yes     Comment: One bottle per week or two if well   • Drug use: No     Allergies:  Patient has no known allergies.    Objective     Vital Signs  Temp:  [97.7 °F (36.5 °C)] 97.7  °F (36.5 °C)  Heart Rate:  [49-56] 55  Resp:  [14-18] 15  BP: (102-180)/(50-79) 104/54      Physical Exam:      General Appearance:    Alert, cooperative, in no acute distress   Head:    Normocephalic, without obvious abnormality, atraumatic   Eyes:           Conjunctivae and sclerae normal, no   icterus, no pallor, corneas clear, PERRLA   Throat:   No oral lesions, no thrush, oral mucosa moist   Neck:   No adenopathy, supple, trachea midline, no thyromegaly, no   carotid bruit, no JVD   Lungs:     Clear to auscultation,respirations regular, even and                  unlabored    Heart:    Regular rhythm and normal rate, normal S1 and S2, no            murmur, no gallop, no rub, no click   Chest Wall:    No abnormalities observed   Abdomen:    Hypoactive bowel sounds, exquisite tenderness in the left lower quadrant to suprapubic area on palpation.  No rigidity, no rebound per se.   Rectal:     Deferred   Extremities:   Moves all extremities well, no edema, no cyanosis, no             redness   Pulses:   Pulses palpable and equal bilaterally   Skin:   No bleeding, bruising or rash   Lymph nodes:   No palpable adenopathy   Neurologic:   Cranial nerves 2 - 12 grossly intact, sensation intact, DTR       present and equal bilaterally       Labs:  Lab Results (last 24 hours)     Procedure Component Value Units Date/Time    [place] Draw [[id number]] Collected:  11/12/19 2106    Specimen:  Blood Updated:  11/12/19 2246    Narrative:       The following orders were created for panel order [place] Draw.  Procedure                               Abnormality         Status                     ---------                               -----------         ------                     Light Blue Top[[id number]]                                   Final result               Green Top (Gel)[[id number]]                                  Final result               Lavender Top[[id number]]                                     Final result                Gold Top - SST[[id number]]                                   Final result                 Please view results for these tests on the individual orders.    Lavender Top [[id number]] Collected:  11/12/19 2106    Specimen:  Blood Updated:  11/12/19 2246     Extra Tube hold for add-on     Comment: Auto resulted       Light Blue Top [[id number]] Collected:  11/12/19 2106    Specimen:  Blood Updated:  11/12/19 2246     Extra Tube hold for add-on     Comment: Auto resulted       Green Top (Gel) [[id number]] Collected:  11/12/19 2106    Specimen:  Blood Updated:  11/12/19 2246     Extra Tube Hold for add-ons.     Comment: Auto resulted.       Gold Top - SST [[id number]] Collected:  11/12/19 2106    Specimen:  Blood Updated:  11/12/19 2246     Extra Tube Hold for add-ons.     Comment: Auto resulted.       Comprehensive Metabolic Panel [[id number]]  (Abnormal) Collected:  11/12/19 2106    Specimen:  Blood Updated:  11/12/19 2209     Glucose 118 mg/dL      BUN 24 mg/dL      Creatinine 0.91 mg/dL      Sodium 127 mmol/L      Potassium 4.3 mmol/L      Comment: Specimen hemolyzed.  Results may be affected.        Chloride 89 mmol/L      CO2 25.0 mmol/L      Calcium 10.3 mg/dL      Total Protein 7.6 g/dL      Albumin 4.00 g/dL      ALT (SGPT) 11 U/L      Comment: Specimen hemolyzed.  Results may be affected.        AST (SGOT) 19 U/L      Comment: Specimen hemolyzed.  Results may be affected.        Alkaline Phosphatase 70 U/L      Total Bilirubin 0.7 mg/dL      eGFR Non African Amer 59 mL/min/1.73      Globulin 3.6 gm/dL      A/G Ratio 1.1 g/dL      BUN/Creatinine Ratio 26.4     Anion Gap 13.0 mmol/L     Narrative:       GFR Normal >60  Chronic Kidney Disease <60  Kidney Failure <15    Lipase [[id number]]  (Normal) Collected:  11/12/19 2106    Specimen:  Blood Updated:  11/12/19 2153     Lipase 45 U/L     Urinalysis With Culture If Indicated - Urine, Clean Catch [[id number]]  (Abnormal) Collected:  11/12/19 2125    Specimen:  Urine,  Clean Catch Updated:  11/12/19 2139     Color, UA Yellow     Appearance, UA Clear     pH, UA 7.0     Specific Gravity, UA 1.014     Glucose, UA Negative     Ketones, UA Negative     Bilirubin, UA Negative     Blood, UA Negative     Protein, UA Negative     Leuk Esterase, UA Small (1+)     Nitrite, UA Negative     Urobilinogen, UA 0.2 E.U./dL    Urinalysis, Microscopic Only - Urine, Clean Catch [[id number]]  (Abnormal) Collected:  11/12/19 2125    Specimen:  Urine, Clean Catch Updated:  11/12/19 2139     RBC, UA 0-2 /HPF      WBC, UA 3-5 /HPF      Bacteria, UA None Seen /HPF      Squamous Epithelial Cells, UA None Seen /HPF      Hyaline Casts, UA None Seen /LPF      Methodology Automated Microscopy    CBC & Differential [[id number]] Collected:  11/12/19 2106    Specimen:  Blood Updated:  11/12/19 2139    Narrative:       The following orders were created for panel order CBC & Differential.  Procedure                               Abnormality         Status                     ---------                               -----------         ------                     CBC Auto Differential[[id number]]        Abnormal            Final result                 Please view results for these tests on the individual orders.    CBC Auto Differential [[id number]]  (Abnormal) Collected:  11/12/19 2106    Specimen:  Blood Updated:  11/12/19 2139     WBC 10.90 10*3/mm3      RBC 4.04 10*6/mm3      Hemoglobin 13.3 g/dL      Hematocrit 38.6 %      MCV 95.7 fL      MCH 32.9 pg      MCHC 34.3 g/dL      RDW 13.3 %      RDW-SD 45.5 fl      MPV 8.7 fL      Platelets 367 10*3/mm3      Neutrophil % 79.2 %      Lymphocyte % 9.9 %      Monocyte % 8.8 %      Eosinophil % 1.5 %      Basophil % 0.6 %      Neutrophils, Absolute 8.60 10*3/mm3      Lymphocytes, Absolute 1.10 10*3/mm3      Monocytes, Absolute 1.00 10*3/mm3      Eosinophils, Absolute 0.20 10*3/mm3      Basophils, Absolute 0.10 10*3/mm3      nRBC 0.0 /100 WBC           Radiology:  Ct Abdomen  Pelvis Without Contrast    Result Date: 11/12/2019   1. Acute diverticulitis/colitis of the sigmoid colon. No evidence of free air or focal collections. 2. Mild circumferential bladder wall thickening, likely reactive and related to adjacent inflammatory changes. Recommend clinical correlation for findings of cystitis. 3. Ancillary findings as described above.    Electronically Signed By-Coreen Castellano On:11/12/2019 10:24 PM This report was finalized on [id number] by  Coreen Castellano, .        Results Review:    I reviewed the patient's new clinical results.  I reviewed the patient's new imaging results and agree with the interpretation.    Assessment/Plan       Abdominal pain  Sigmoid diverticulitis  Nausea without vomiting  Essential hypertension  Prediabetes  Hypothyroidism   history tachybradycardia syndrome    Start gentle IV fluid hydration, will advance diet to clears for now.  Start Zosyn again.  Continue outpatient medications.  Hope is eventually patient will be able to advance her diet and then can discharge her home on oral antibiotics.  But her abdominal exam is quite concerning so we need to follow this as well to make sure it continues to improve.  Monitor labs and correct electrolytes PRN.    Expected Length of Stay 2-3 days    I discussed the patients findings and my recommendations with patient and nursing staff.     Gifty Arroyo,   11/13/19  9:12 AM

## 2019-11-13 NOTE — ED PROVIDER NOTES
"Subjective   History of Present Illness  Abdominal pain  79-year-old female complains of some lower abdominal pain felt to be diverticulitis.  She was started on levofloxacin and Flagyl today and had's had one dose.  States the pain was worse this evening and she wanted some pain medication for her discomfort.  She states she has had nausea without vomiting she reports no fevers or chills or trauma.  Pain is reported moderate to severe intensity  Review of Systems   Constitutional: Negative.    HENT: Negative.    Respiratory: Negative.    Cardiovascular: Negative.    Gastrointestinal: Positive for abdominal pain and nausea. Negative for constipation, diarrhea and vomiting.   Genitourinary: Negative.    Musculoskeletal: Negative.    Skin: Negative.    Neurological: Negative.    Psychiatric/Behavioral: Negative.        History reviewed. No pertinent past medical history.    No Known Allergies    History reviewed. No pertinent surgical history.    No family history on file.    Social History     Socioeconomic History   • Marital status:      Spouse name: Not on file   • Number of children: Not on file   • Years of education: Not on file   • Highest education level: Not on file       Prior to Admission medications    Not on File         Objective   Physical Exam  /58   Pulse 50   Temp 97.7 °F (36.5 °C)   Resp 15   Ht 170.2 cm (67\")   Wt 83.8 kg (184 lb 11.9 oz)   SpO2 97%   BMI 28.94 kg/m²      General: Well-developed well-appearing, no acute distress, alert and appropriate  Eyes: Pupils round and reactive, sclera nonicteric  HEENT: Mucous membranes moist, no mucosal swelling  Neck: Supple, no nuchal rigidity, no lymphadenopathy  Respirations: Respirations nonlabored, equal breath sounds bilaterally, clear lungs  Heart regular rate and rhythm, no murmurs rubs or gallops,   Abdomen soft tender palpation lower abdomen, no rebound or guarding, nondistended, no hepatosplenomegaly, no hernia, no mass, " normal bowel sounds, no CVA tenderness  Extremities no clubbing cyanosis or edema, calves are symmetric and nontender  Neuro cranial nerves grossly intact, no focal limb deficits  Psych oriented, pleasant affect  Skin no rash, brisk cap refill    Procedures           ED Course      Results for orders placed or performed during the hospital encounter of 11/12/19   Comprehensive Metabolic Panel   Result Value Ref Range    Glucose 118 (H) 65 - 99 mg/dL    BUN 24 (H) 8 - 23 mg/dL    Creatinine 0.91 0.57 - 1.00 mg/dL    Sodium 127 (L) 136 - 145 mmol/L    Potassium 4.3 3.5 - 5.2 mmol/L    Chloride 89 (L) 98 - 107 mmol/L    CO2 25.0 22.0 - 29.0 mmol/L    Calcium 10.3 8.6 - 10.5 mg/dL    Total Protein 7.6 6.0 - 8.5 g/dL    Albumin 4.00 3.50 - 5.20 g/dL    ALT (SGPT) 11 1 - 33 U/L    AST (SGOT) 19 1 - 32 U/L    Alkaline Phosphatase 70 39 - 117 U/L    Total Bilirubin 0.7 0.2 - 1.2 mg/dL    eGFR Non African Amer 59 (L) >60 mL/min/1.73    Globulin 3.6 gm/dL    A/G Ratio 1.1 g/dL    BUN/Creatinine Ratio 26.4 (H) 7.0 - 25.0    Anion Gap 13.0 5.0 - 15.0 mmol/L   Lipase   Result Value Ref Range    Lipase 45 13 - 60 U/L   Urinalysis With Culture If Indicated - Urine, Clean Catch   Result Value Ref Range    Color, UA Yellow Yellow, Straw    Appearance, UA Clear Clear    pH, UA 7.0 5.0 - 8.0    Specific Gravity, UA 1.014 1.005 - 1.030    Glucose, UA Negative Negative    Ketones, UA Negative Negative    Bilirubin, UA Negative Negative    Blood, UA Negative Negative    Protein, UA Negative Negative    Leuk Esterase, UA Small (1+) (A) Negative    Nitrite, UA Negative Negative    Urobilinogen, UA 0.2 E.U./dL 0.2 - 1.0 E.U./dL   CBC Auto Differential   Result Value Ref Range    WBC 10.90 (H) 3.40 - 10.80 10*3/mm3    RBC 4.04 3.77 - 5.28 10*6/mm3    Hemoglobin 13.3 12.0 - 15.9 g/dL    Hematocrit 38.6 34.0 - 46.6 %    MCV 95.7 79.0 - 97.0 fL    MCH 32.9 26.6 - 33.0 pg    MCHC 34.3 31.5 - 35.7 g/dL    RDW 13.3 12.3 - 15.4 %    RDW-SD 45.5  37.0 - 54.0 fl    MPV 8.7 6.0 - 12.0 fL    Platelets 367 140 - 450 10*3/mm3    Neutrophil % 79.2 (H) 42.7 - 76.0 %    Lymphocyte % 9.9 (L) 19.6 - 45.3 %    Monocyte % 8.8 5.0 - 12.0 %    Eosinophil % 1.5 0.3 - 6.2 %    Basophil % 0.6 0.0 - 1.5 %    Neutrophils, Absolute 8.60 (H) 1.70 - 7.00 10*3/mm3    Lymphocytes, Absolute 1.10 0.70 - 3.10 10*3/mm3    Monocytes, Absolute 1.00 (H) 0.10 - 0.90 10*3/mm3    Eosinophils, Absolute 0.20 0.00 - 0.40 10*3/mm3    Basophils, Absolute 0.10 0.00 - 0.20 10*3/mm3    nRBC 0.0 0.0 - 0.2 /100 WBC   Urinalysis, Microscopic Only - Urine, Clean Catch   Result Value Ref Range    RBC, UA 0-2 (A) None Seen /HPF    WBC, UA 3-5 (A) None Seen /HPF    Bacteria, UA None Seen None Seen /HPF    Squamous Epithelial Cells, UA None Seen None Seen, 0-2 /HPF    Hyaline Casts, UA None Seen None Seen /LPF    Methodology Automated Microscopy    Light Blue Top   Result Value Ref Range    Extra Tube hold for add-on    Green Top (Gel)   Result Value Ref Range    Extra Tube Hold for add-ons.    Lavender Top   Result Value Ref Range    Extra Tube hold for add-on    Gold Top - SST   Result Value Ref Range    Extra Tube Hold for add-ons.      Ct Abdomen Pelvis Without Contrast    Result Date: 11/12/2019   1. Acute diverticulitis/colitis of the sigmoid colon. No evidence of free air or focal collections. 2. Mild circumferential bladder wall thickening, likely reactive and related to adjacent inflammatory changes. Recommend clinical correlation for findings of cystitis. 3. Ancillary findings as described above.    Electronically Signed By-Coreen Castellano On:11/12/2019 10:24 PM This report was finalized on [id number] by  Coreen Castellano, .                MDM  Patient states she is having some abdominal pain and nausea despite the antibiotic treatment for her diverticulitis started today.  She was given pain nausea medication.  States she is still feeling nauseated does not feel like she can go home.  She has a benign  abdominal examination with no signs of peritonitis or acute abdomen.  The CT scan does not show any comp gating issues at this point.  The case discussed with Dr. Joiner patient placed hospital observation she was ordered IV Zosyn for her diverticulitis.  She does have some hyponatremia and ordered normal saline.  Final diagnoses:   Abdominal pain, unspecified abdominal location   Diverticulitis   Hyponatremia              Giovanny Ellis MD  11/12/19 6828

## 2019-11-14 LAB
ANION GAP SERPL CALCULATED.3IONS-SCNC: 12 MMOL/L (ref 5–15)
BASOPHILS # BLD AUTO: 0 10*3/MM3 (ref 0–0.2)
BASOPHILS NFR BLD AUTO: 0.7 % (ref 0–1.5)
BUN BLD-MCNC: 11 MG/DL (ref 8–23)
BUN/CREAT SERPL: 14.7 (ref 7–25)
CALCIUM SPEC-SCNC: 10 MG/DL (ref 8.6–10.5)
CHLORIDE SERPL-SCNC: 100 MMOL/L (ref 98–107)
CO2 SERPL-SCNC: 25 MMOL/L (ref 22–29)
CREAT BLD-MCNC: 0.75 MG/DL (ref 0.57–1)
DEPRECATED RDW RBC AUTO: 44.6 FL (ref 37–54)
EOSINOPHIL # BLD AUTO: 0.2 10*3/MM3 (ref 0–0.4)
EOSINOPHIL NFR BLD AUTO: 3.1 % (ref 0.3–6.2)
ERYTHROCYTE [DISTWIDTH] IN BLOOD BY AUTOMATED COUNT: 13.4 % (ref 12.3–15.4)
GFR SERPL CREATININE-BSD FRML MDRD: 74 ML/MIN/1.73
GLUCOSE BLD-MCNC: 112 MG/DL (ref 65–99)
HCT VFR BLD AUTO: 35.2 % (ref 34–46.6)
HGB BLD-MCNC: 12.5 G/DL (ref 12–15.9)
LYMPHOCYTES # BLD AUTO: 1.6 10*3/MM3 (ref 0.7–3.1)
LYMPHOCYTES NFR BLD AUTO: 24.9 % (ref 19.6–45.3)
MAGNESIUM SERPL-MCNC: 1.9 MG/DL (ref 1.6–2.4)
MCH RBC QN AUTO: 34.1 PG (ref 26.6–33)
MCHC RBC AUTO-ENTMCNC: 35.6 G/DL (ref 31.5–35.7)
MCV RBC AUTO: 95.6 FL (ref 79–97)
MONOCYTES # BLD AUTO: 0.7 10*3/MM3 (ref 0.1–0.9)
MONOCYTES NFR BLD AUTO: 11.2 % (ref 5–12)
NEUTROPHILS # BLD AUTO: 3.9 10*3/MM3 (ref 1.7–7)
NEUTROPHILS NFR BLD AUTO: 60.1 % (ref 42.7–76)
NRBC BLD AUTO-RTO: 0.1 /100 WBC (ref 0–0.2)
PLATELET # BLD AUTO: 328 10*3/MM3 (ref 140–450)
PMV BLD AUTO: 8 FL (ref 6–12)
POTASSIUM BLD-SCNC: 4.1 MMOL/L (ref 3.5–5.2)
RBC # BLD AUTO: 3.68 10*6/MM3 (ref 3.77–5.28)
SODIUM BLD-SCNC: 137 MMOL/L (ref 136–145)
WBC NRBC COR # BLD: 6.6 10*3/MM3 (ref 3.4–10.8)

## 2019-11-14 PROCEDURE — 83735 ASSAY OF MAGNESIUM: CPT | Performed by: FAMILY MEDICINE

## 2019-11-14 PROCEDURE — 25010000002 PIPERACILLIN SOD-TAZOBACTAM PER 1 G: Performed by: FAMILY MEDICINE

## 2019-11-14 PROCEDURE — 25010000002 HEPARIN (PORCINE) PER 1000 UNITS: Performed by: FAMILY MEDICINE

## 2019-11-14 PROCEDURE — 93005 ELECTROCARDIOGRAM TRACING: CPT | Performed by: FAMILY MEDICINE

## 2019-11-14 PROCEDURE — 80048 BASIC METABOLIC PNL TOTAL CA: CPT | Performed by: FAMILY MEDICINE

## 2019-11-14 PROCEDURE — 85025 COMPLETE CBC W/AUTO DIFF WBC: CPT | Performed by: FAMILY MEDICINE

## 2019-11-14 RX ADMIN — HEPARIN SODIUM 5000 UNITS: 5000 INJECTION INTRAVENOUS; SUBCUTANEOUS at 05:05

## 2019-11-14 RX ADMIN — ATENOLOL 25 MG: 25 TABLET ORAL at 09:22

## 2019-11-14 RX ADMIN — DEXAMETHASONE 0.5 MG: 0.5 TABLET ORAL at 17:36

## 2019-11-14 RX ADMIN — DOCUSATE SODIUM 100 MG: 100 CAPSULE, LIQUID FILLED ORAL at 09:22

## 2019-11-14 RX ADMIN — LEVOTHYROXINE SODIUM 50 MCG: 50 TABLET ORAL at 05:05

## 2019-11-14 RX ADMIN — LOSARTAN POTASSIUM: 50 TABLET, FILM COATED ORAL at 09:21

## 2019-11-14 RX ADMIN — Medication 10 ML: at 09:23

## 2019-11-14 RX ADMIN — CYANOCOBALAMIN TAB 1000 MCG 1000 MCG: 1000 TAB at 09:22

## 2019-11-14 RX ADMIN — DOCUSATE SODIUM 100 MG: 100 CAPSULE, LIQUID FILLED ORAL at 20:07

## 2019-11-14 RX ADMIN — DEXAMETHASONE 0.5 MG: 0.5 TABLET ORAL at 09:30

## 2019-11-14 RX ADMIN — PIPERACILLIN AND TAZOBACTAM 3.38 G: 3; .375 INJECTION, POWDER, LYOPHILIZED, FOR SOLUTION INTRAVENOUS at 01:37

## 2019-11-14 RX ADMIN — PIPERACILLIN AND TAZOBACTAM 3.38 G: 3; .375 INJECTION, POWDER, LYOPHILIZED, FOR SOLUTION INTRAVENOUS at 17:36

## 2019-11-14 RX ADMIN — ATENOLOL 25 MG: 25 TABLET ORAL at 20:07

## 2019-11-14 RX ADMIN — Medication 10 ML: at 20:07

## 2019-11-14 RX ADMIN — ASPIRIN 81 MG: 81 TABLET, COATED ORAL at 09:22

## 2019-11-14 RX ADMIN — PIPERACILLIN AND TAZOBACTAM 3.38 G: 3; .375 INJECTION, POWDER, LYOPHILIZED, FOR SOLUTION INTRAVENOUS at 09:31

## 2019-11-14 NOTE — PLAN OF CARE
Problem: Patient Care Overview  Goal: Plan of Care Review  Outcome: Ongoing (interventions implemented as appropriate)   11/14/19 0142   Coping/Psychosocial   Plan of Care Reviewed With patient   Plan of Care Review   Progress no change     Goal: Individualization and Mutuality  Outcome: Ongoing (interventions implemented as appropriate)    Goal: Discharge Needs Assessment  Outcome: Ongoing (interventions implemented as appropriate)    Goal: Interprofessional Rounds/Family Conf  Outcome: Ongoing (interventions implemented as appropriate)      Problem: Pain, Acute (Adult)  Goal: Identify Related Risk Factors and Signs and Symptoms  Outcome: Ongoing (interventions implemented as appropriate)    Goal: Acceptable Pain Control/Comfort Level  Outcome: Ongoing (interventions implemented as appropriate)      Problem: Nausea/Vomiting (Adult)  Goal: Identify Related Risk Factors and Signs and Symptoms  Outcome: Ongoing (interventions implemented as appropriate)    Goal: Symptom Relief  Outcome: Ongoing (interventions implemented as appropriate)    Goal: Adequate Hydration  Outcome: Ongoing (interventions implemented as appropriate)

## 2019-11-14 NOTE — PROGRESS NOTES
LOS: 1 day   Patient Care Team:  Elin Galo MD as PCP - General    Subjective:    Patient laying in bed comfortably.  Initially upon ED admission patient had reported  moderate crampy abdominal pain in the suprapubic into the left lower quadrant area abdominal pain.  Reports constipation last BM x3 days.Patient denies abdominal/suprapubic pain.  Reports of feeling much better and would like to go home.    Objective:     Patient laying in bed comfortably.  Hypoactive bowel sounds, slight tenderness lower left quad upon palpation.  No rigidity no rebound noted.     Review of Systems:     All systems were reviewed: See interval history above.    Vital Signs  Temp:  [97.5 °F (36.4 °C)-97.8 °F (36.6 °C)] 97.5 °F (36.4 °C)  Heart Rate:  [47-99] 99  Resp:  [17-18] 18  BP: (126-144)/(72-97) 139/72    Physical Exam:    General Appearance:    Alert, cooperative, in no acute distress   Head:    Normocephalic, without obvious abnormality, atraumatic   Eyes:           Conjunctivae and sclerae normal, no   icterus, no pallor, corneas clear, PERRLA   Throat:   No oral lesions, no thrush, oral mucosa moist   Neck:   No adenopathy, supple, trachea midline, no thyromegaly, no   carotid bruit, no JVD   Lungs:     Clear to auscultation,respirations regular, even and                  unlabored    Heart:    Regular rhythm and normal rate, normal S1 and S2, no            murmur, no gallop, no rub, no click   Chest Wall:    No abnormalities observed   Abdomen:    Hypoactive bowel sounds, slight tenderness in the left lower quadrant to suprapubic area on palpation.  No rigidity, no rebound per se.   Rectal:     Deferred   Extremities:   Moves all extremities well, no edema, no cyanosis, no             redness   Pulses:   Pulses palpable and equal bilaterally   Skin:   No bleeding, bruising or rash   Lymph nodes:   No palpable adenopathy   Neurologic:   Cranial nerves 2 - 12 grossly intact, sensation intact, DTR       present and  equal bilaterally     Radiology:  Ct Abdomen Pelvis Without Contrast    Result Date: 11/12/2019   1. Acute diverticulitis/colitis of the sigmoid colon. No evidence of free air or focal collections. 2. Mild circumferential bladder wall thickening, likely reactive and related to adjacent inflammatory changes. Recommend clinical correlation for findings of cystitis. 3. Ancillary findings as described above.    Electronically Signed By-Coreen Castellano On:11/12/2019 10:24 PM This report was finalized on [id number] by  Coreen Castellano, .       Results Review:       I reviewed the patient's new clinical results.  I reviewed the patient's new imaging results and agree with the interpretation.    Medication Review:   Scheduled Meds:  aspirin 81 mg Oral Daily   atenolol 25 mg Oral Q12H   dexamethasone 0.5 mg Oral Daily With Breakfast & Dinner   docusate sodium 100 mg Oral BID   heparin (porcine) 5,000 Units Subcutaneous Q8H   levothyroxine 50 mcg Oral Q AM   losartan-HCTZ (HYZAAR) 100-12.5 combo dose  Oral Daily   piperacillin-tazobactam 3.375 g Intravenous Q8H   sodium chloride 10 mL Intravenous Q12H   vitamin B-12 1,000 mcg Oral Daily     Continuous Infusions:  sodium chloride 100 mL/hr Last Rate: 100 mL/hr (11/13/19 1059)     PRN Meds:.•  acetaminophen **OR** acetaminophen **OR** acetaminophen  •  bisacodyl  •  magnesium hydroxide  •  magnesium sulfate **OR** magnesium sulfate **OR** magnesium sulfate  •  potassium chloride  •  potassium chloride  •  sodium chloride  •  [COMPLETED] Insert peripheral IV **AND** sodium chloride  •  sodium chloride    Labs:    CBC    Results from last 7 days   Lab Units 11/14/19  0352 11/13/19 0932 11/12/19 2106   WBC 10*3/mm3 6.60 10.10 10.90*   HEMOGLOBIN g/dL 12.5 12.0 13.3   PLATELETS 10*3/mm3 328 303 367     BMP Results from last 7 days   Lab Units 11/14/19  0352 11/13/19 0933 11/12/19 2106   SODIUM mmol/L 137 132* 127*   POTASSIUM mmol/L 4.1 4.1 4.3   CHLORIDE mmol/L 100 98 89*   CO2  mmol/L 25.0 22.0 25.0   BUN mg/dL 11 17 24*   CREATININE mg/dL 0.75 0.71 0.91   GLUCOSE mg/dL 112* 97 118*   MAGNESIUM mg/dL 1.9  --   --      Cr Clearance Estimated Creatinine Clearance: 60.4 mL/min (by C-G formula based on SCr of 0.75 mg/dL).  Coag     HbA1C No results found for: HGBA1C  Blood Glucose No results found for: POCGLU  Infection     CMP Results from last 7 days   Lab Units 11/14/19  0352 11/13/19  0933 11/12/19  2106   SODIUM mmol/L 137 132* 127*   POTASSIUM mmol/L 4.1 4.1 4.3   CHLORIDE mmol/L 100 98 89*   CO2 mmol/L 25.0 22.0 25.0   BUN mg/dL 11 17 24*   CREATININE mg/dL 0.75 0.71 0.91   GLUCOSE mg/dL 112* 97 118*   ALBUMIN g/dL  --   --  4.00   BILIRUBIN mg/dL  --   --  0.7   ALK PHOS U/L  --   --  70   AST (SGOT) U/L  --   --  19   ALT (SGPT) U/L  --   --  11   LIPASE U/L  --   --  45     UA  Results from last 7 days   Lab Units 11/13/19  1110 11/12/19  2125   NITRITE UA  Negative Negative   WBC UA /HPF  --  3-5*   BACTERIA UA /HPF  --  None Seen   SQUAM EPITHEL UA /HPF  --  None Seen     Radiology(recent) Ct Abdomen Pelvis Without Contrast    Result Date: 11/12/2019   1. Acute diverticulitis/colitis of the sigmoid colon. No evidence of free air or focal collections. 2. Mild circumferential bladder wall thickening, likely reactive and related to adjacent inflammatory changes. Recommend clinical correlation for findings of cystitis. 3. Ancillary findings as described above.    Electronically Signed ByParamjit Castellano On:11/12/2019 10:24 PM This report was finalized on [id number] by  Coreen Castellano, .     Assessment:    Abdominal pain  Sigmoid diverticulitis  Nausea without vomiting  Essential hypertension  Prediabetes  Hypothyroidism   history tachybradycardia syndrome    Plan:    Start gentle IV fluid hydration to be stopped, will advance diet to full regular diet. Continue with Zosyn as needed with outpatient medications.    If patient continues to improve with full diet patient will be discharged  and continue with at home p.o. antibiotics.  Continuation of monitored labs and correct electrolytes PRN.     Expected Length of Stay 2-3 days     I discussed the patients findings and my recommendations with patient and nursing staff.     Eileen Doshi, SAILAJA  11/14/19  9:24 AM

## 2019-11-14 NOTE — PROGRESS NOTES
Discharge Planning Assessment  [facility]     Patient Name: Kingsley Hunt  MRN: [id number]  Today's Date: 11/14/2019    Admit Date: 11/12/2019    Discharge Needs Assessment     Row Name 11/14/19 1028       Living Environment    Lives With  alone    Current Living Arrangements  home/apartment/condo    Duration at Residence  Lives in [place] Friend Chacha Olivas at bedside.    Primary Care Provided by  self    Provides Primary Care For  no one    Family Caregiver if Needed  none    Able to Return to Prior Arrangements  yes       Resource/Environmental Concerns    Resource/Environmental Concerns  none       Transition Planning    Patient/Family Anticipates Transition to  home    Transportation Anticipated  car, drives self       Discharge Needs Assessment    Readmission Within the Last 30 Days  no previous admission in last 30 days    Concerns Comments  None    Equipment Currently Used at Home  none    Anticipated Changes Related to Illness  none    Equipment Needed After Discharge  none    Discharge Coordination/Progress  Return home - no needs.    Row Name 11/14/19 1018       Living Environment    Name(s) of Who Lives With Patient  Notes from 11/13/19 are in error for DC needs assessment.        Discharge Plan     Row Name 11/14/19 1059       Plan    Plan  Return home - no needs.    Plan Comments  Note from 11/13 in error.                  Demographic Summary     Row Name 11/14/19 1026       General Information    Required Notices Provided  Observation Status Notice    Referral Source  admission list    Reason for Consult  discharge planning    Preferred Language  English                   Asim Wong RN

## 2019-11-14 NOTE — PLAN OF CARE
Problem: Pain, Acute (Adult)  Goal: Acceptable Pain Control/Comfort Level  Outcome: Outcome(s) achieved Date Met: 11/14/19 11/14/19 5874   Pain, Acute (Adult)   Acceptable Pain Control/Comfort Level achieves outcome

## 2019-11-15 VITALS
HEIGHT: 67 IN | WEIGHT: 175.93 LBS | BODY MASS INDEX: 27.61 KG/M2 | DIASTOLIC BLOOD PRESSURE: 75 MMHG | SYSTOLIC BLOOD PRESSURE: 113 MMHG | OXYGEN SATURATION: 97 % | HEART RATE: 80 BPM | RESPIRATION RATE: 17 BRPM | TEMPERATURE: 97.5 F

## 2019-11-15 LAB
ANION GAP SERPL CALCULATED.3IONS-SCNC: 11 MMOL/L (ref 5–15)
BASOPHILS # BLD AUTO: 0 10*3/MM3 (ref 0–0.2)
BASOPHILS NFR BLD AUTO: 0.7 % (ref 0–1.5)
BUN BLD-MCNC: 15 MG/DL (ref 8–23)
BUN/CREAT SERPL: 18.5 (ref 7–25)
CALCIUM SPEC-SCNC: 10.2 MG/DL (ref 8.6–10.5)
CHLORIDE SERPL-SCNC: 103 MMOL/L (ref 98–107)
CO2 SERPL-SCNC: 25 MMOL/L (ref 22–29)
CREAT BLD-MCNC: 0.81 MG/DL (ref 0.57–1)
DEPRECATED RDW RBC AUTO: 45.1 FL (ref 37–54)
EOSINOPHIL # BLD AUTO: 0.1 10*3/MM3 (ref 0–0.4)
EOSINOPHIL NFR BLD AUTO: 2.2 % (ref 0.3–6.2)
ERYTHROCYTE [DISTWIDTH] IN BLOOD BY AUTOMATED COUNT: 13.3 % (ref 12.3–15.4)
GFR SERPL CREATININE-BSD FRML MDRD: 68 ML/MIN/1.73
GLUCOSE BLD-MCNC: 161 MG/DL (ref 65–99)
HCT VFR BLD AUTO: 36.5 % (ref 34–46.6)
HGB BLD-MCNC: 13.2 G/DL (ref 12–15.9)
LYMPHOCYTES # BLD AUTO: 1.8 10*3/MM3 (ref 0.7–3.1)
LYMPHOCYTES NFR BLD AUTO: 27.8 % (ref 19.6–45.3)
MCH RBC QN AUTO: 34.8 PG (ref 26.6–33)
MCHC RBC AUTO-ENTMCNC: 36.2 G/DL (ref 31.5–35.7)
MCV RBC AUTO: 96.2 FL (ref 79–97)
MONOCYTES # BLD AUTO: 0.6 10*3/MM3 (ref 0.1–0.9)
MONOCYTES NFR BLD AUTO: 9.1 % (ref 5–12)
NEUTROPHILS # BLD AUTO: 3.9 10*3/MM3 (ref 1.7–7)
NEUTROPHILS NFR BLD AUTO: 60.2 % (ref 42.7–76)
NRBC BLD AUTO-RTO: 0.1 /100 WBC (ref 0–0.2)
PLATELET # BLD AUTO: 358 10*3/MM3 (ref 140–450)
PMV BLD AUTO: 7.8 FL (ref 6–12)
POTASSIUM BLD-SCNC: 4.4 MMOL/L (ref 3.5–5.2)
RBC # BLD AUTO: 3.8 10*6/MM3 (ref 3.77–5.28)
SODIUM BLD-SCNC: 139 MMOL/L (ref 136–145)
WBC NRBC COR # BLD: 6.4 10*3/MM3 (ref 3.4–10.8)

## 2019-11-15 PROCEDURE — 80048 BASIC METABOLIC PNL TOTAL CA: CPT | Performed by: FAMILY MEDICINE

## 2019-11-15 PROCEDURE — 25010000002 PIPERACILLIN SOD-TAZOBACTAM PER 1 G: Performed by: FAMILY MEDICINE

## 2019-11-15 PROCEDURE — 85025 COMPLETE CBC W/AUTO DIFF WBC: CPT | Performed by: FAMILY MEDICINE

## 2019-11-15 RX ADMIN — LEVOTHYROXINE SODIUM 50 MCG: 50 TABLET ORAL at 04:35

## 2019-11-15 RX ADMIN — CYANOCOBALAMIN TAB 1000 MCG 1000 MCG: 1000 TAB at 09:52

## 2019-11-15 RX ADMIN — PIPERACILLIN AND TAZOBACTAM 3.38 G: 3; .375 INJECTION, POWDER, LYOPHILIZED, FOR SOLUTION INTRAVENOUS at 02:40

## 2019-11-15 RX ADMIN — PIPERACILLIN AND TAZOBACTAM 3.38 G: 3; .375 INJECTION, POWDER, LYOPHILIZED, FOR SOLUTION INTRAVENOUS at 09:53

## 2019-11-15 RX ADMIN — DEXAMETHASONE 0.5 MG: 0.5 TABLET ORAL at 09:57

## 2019-11-15 RX ADMIN — ATENOLOL 25 MG: 25 TABLET ORAL at 09:52

## 2019-11-15 RX ADMIN — LOSARTAN POTASSIUM: 50 TABLET, FILM COATED ORAL at 09:52

## 2019-11-15 RX ADMIN — ASPIRIN 81 MG: 81 TABLET, COATED ORAL at 09:52

## 2019-11-15 RX ADMIN — DOCUSATE SODIUM 100 MG: 100 CAPSULE, LIQUID FILLED ORAL at 09:52

## 2019-11-15 NOTE — NURSING NOTE
Monitoring informed me that patient had a new onset of Afib. Ordered STAT EKG to confirm Afib. Patient's heart rate is between 75-80 and she is asymptomatic.     Called Dr. Galo to notify him of patient's condition. No new orders given. MD to evaluate in the morning.     There is no history of Afib in notes or charting. No Afib medications scheduled. Patient is not sure if she has had a history of Afib in the past. She was running sinus clay before rhythm change.     Will continue to monitor

## 2019-11-15 NOTE — DISCHARGE SUMMARY
Date of Discharge:  11/15/2019    Discharge Diagnosis: Abdominal pain-resolved    Presenting Problem/History of Present Illness  Active Hospital Problems    Diagnosis  POA   • Abdominal pain [R10.9]  Yes      Resolved Hospital Problems   No resolved problems to display.      Hospital Course    Patient is a 79 y.o. female had presented with increasing severity of abdominal pain.  She was originally seen by her primary care and was started empirically on levofloxacin and metronidazole.  She had 1 dose but her pain increased so she went to the emergency department and was subsequently admitted CAT scan was positive for sigmoid diverticulitis.  Nausea or vomiting since admission has subsided pain has improved.     She was started on gentle IV fluid hydration, continued antibiotic therapy, on entering of lab and advanced to full regular diet. Patient has shown significant improvement, will be discharged and continue with Zosyn as needed with outpatient medications.       Procedures Performed     Consults:   Consults     Date and Time Order Name Status Description    11/12/2019 2319 Family Medicine Consult Completed           Pertinent Test Results:Ct Abdomen Pelvis Without Contrast    Result Date: 11/12/2019   1. Acute diverticulitis/colitis of the sigmoid colon. No evidence of free air or focal collections. 2. Mild circumferential bladder wall thickening, likely reactive and related to adjacent inflammatory changes. Recommend clinical correlation for findings of cystitis. 3. Ancillary findings as described above.    Electronically Signed By-Coreen Castellano On:11/12/2019 10:24 PM This report was finalized on [id number] by  Coreen Castellano, .      Imaging Results (Last 7 Days)     Procedure Component Value Units Date/Time    CT Abdomen Pelvis Without Contrast [[id number]] Collected:  11/12/19 2220     Updated:  11/12/19 2257    Narrative:       CT ABDOMEN PELVIS WO CONTRAST-     Date of Exam: 11/12/2019 10:01 PM      Indication: Lower abdominal pain and nausea.     Comparison: CT 6/2 /2018     Technique: Contiguous axial CT images were obtained from the lung bases  to the pubic symphysis without contrast. Sagittal and coronal  reconstructions were performed.  Automated exposure control and  iterative reconstruction methods were used.     FINDINGS:     Lower Thorax: No acute process identified. Atherosclerotic  calcifications are present. Granulomatous calcifications are present.     Limited evaluation of the solid organs due to lack of intravenous  contrast.     Liver: Normal size and density. No focal lesions identified.     Gallbladder: The gallbladder appears normal in size with no evidence of  radiopaque gallstones. The biliary tree is nondilated.     Pancreas: No focal masses.  No pancreatic duct dilation.     Spleen: Spleen is normal size.  No focal splenic lesions.     Adrenal glands: Unremarkable.      Kidneys: The kidneys appear normal in size. Scarring is seen surrounding  the kidneys bilaterally. No evidence of nephrolithiasis. No evidence of  hydronephrosis. No suspicious focal lesions identified.     Retroperitoneum/vascular: No retroperitoneal adenopathy identified. No  aneurysmal dilatation of the vascular structures. Atherosclerotic  calcifications are present.     Stomach and Bowel: There is diffuse wall thickening of the sigmoid  colon. Inflammatory changes are present within the adjacent fat. This  extends to the distal descending colon. Air is diverticulosis of the  descending colon and the sigmoid colon. No focal fluid collections  identified. The appendix is not well visualized. No inflammatory changes  are present within the right lower quadrant. No mesenteric adenopathy  identified. No evidence of free air.     Bladder: Mild circumferential bladder wall thickening is present, likely  reactive.     Pelvic Organs: No acute process identified.     Osseous structures: No aggressive focal lytic or sclerotic  osseous  lesions. No acute osseous abnormality.          Impression:          1. Acute diverticulitis/colitis of the sigmoid colon. No evidence of  free air or focal collections.  2. Mild circumferential bladder wall thickening, likely reactive and  related to adjacent inflammatory changes. Recommend clinical correlation  for findings of cystitis.  3. Ancillary findings as described above.           Electronically Signed By-Coreen Castellano On:11/12/2019 10:24 PM  This report was finalized on [id number] by  Coreen Castellano, .              Condition on Discharge:  Fair    Vital Signs  Temp:  [97.5 °F (36.4 °C)-98.1 °F (36.7 °C)] 97.5 °F (36.4 °C)  Heart Rate:  [80-92] 80  Resp:  [17-18] 17  BP: (113-121)/(75-76) 113/75    Physical Exam:     General Appearance:    Alert, cooperative, in no acute distress   Head:    Normocephalic, without obvious abnormality, atraumatic   Eyes:           Conjunctivae and sclerae normal, no   icterus, no pallor, corneas clear, PERRLA   Throat:   No oral lesions, no thrush, oral mucosa moist   Neck:   No adenopathy, supple, trachea midline, no thyromegaly, no   carotid bruit, no JVD   Lungs:     Clear to auscultation,respirations regular, even and                  unlabored    Heart:    Regular rhythm and normal rate, normal S1 and S2, no            murmur, no gallop, no rub, no click   Chest Wall:    No abnormalities observed   Abdomen:     Normal bowel sounds, no masses, no organomegaly, soft        non-tender, non-distended, no guarding, no rebound                tenderness   Rectal:     Deferred   Extremities:   Moves all extremities well, no edema, no cyanosis, no             redness   Pulses:   Pulses palpable and equal bilaterally   Skin:   No bleeding, bruising or rash   Lymph nodes:   No palpable adenopathy   Neurologic:   Cranial nerves 2 - 12 grossly intact, sensation intact, DTR       present and equal bilaterally         Discharge Disposition  Home or Self Care    Discharge  Medications     Discharge Medications      Continue These Medications      Instructions Start Date   acebutolol 200 MG capsule  Commonly known as:  SECTRAL   200 mg, Oral, 2 Times Daily      aspirin 81 MG EC tablet   81 mg, Oral, Daily      BENEFIBER DRINK MIX PO   1 package, Oral, Daily      cortisone 25 MG tablet  Commonly known as:  CORTONE   25 mg, Oral, Daily      docusate sodium 100 MG capsule  Commonly known as:  COLACE   100 mg, Oral, 2 Times Daily      Iodine (Kelp) 0.15 MG tablet   1 tablet, Oral, Daily      levoFLOXacin 750 MG tablet  Commonly known as:  LEVAQUIN   750 mg, Oral, Daily      levothyroxine 50 MCG tablet  Commonly known as:  SYNTHROID, LEVOTHROID   50 mcg, Oral, Daily      losartan 50 MG tablet 100 mg, hydroCHLOROthiazide 12.5 MG 12.5 mg   1 dose, Oral, Daily      magnesium hydroxide 400 MG/5ML suspension  Commonly known as:  MILK OF MAGNESIA   30 mL, Oral, Daily PRN      metroNIDAZOLE 500 MG tablet  Commonly known as:  FLAGYL   500 mg, Oral, 3 Times Daily      multivitamin with minerals tablet tablet   1 tablet, Oral, Daily      polyethylene glycol packet  Commonly known as:  MIRALAX   17 g, Oral, Daily      vitamin B-12 1000 MCG tablet  Commonly known as:  CYANOCOBALAMIN   1,000 mcg, Oral, Daily      vitamin D3 125 MCG (5000 UT) capsule capsule   5,000 Units, Oral, Daily             Discharge Diet:   Diet Instructions     Advance Diet As Tolerated            Activity at Discharge:     Follow-up Appointments  No future appointments.  Additional Instructions for the Follow-ups that You Need to Schedule     Call MD With Problems / Concerns   As directed      Call office if presenting s/s worsen.    Order Comments:  Call office if presenting s/s worsen.                Test Results Pending at Discharge       SAILAJA Hutchison  11/15/19  10:43 AM

## 2019-11-15 NOTE — PLAN OF CARE
Problem: Patient Care Overview  Goal: Plan of Care Review   11/15/19 0548   Coping/Psychosocial   Plan of Care Reviewed With patient   OTHER   Outcome Summary Patient resting abed, no complaints voiced.

## 2019-11-16 ENCOUNTER — READMISSION MANAGEMENT (OUTPATIENT)
Dept: CALL CENTER | Facility: HOSPITAL | Age: 79
End: 2019-11-16

## 2019-11-16 NOTE — OUTREACH NOTE
Prep Survey      Responses   Facility patient discharged from?  [facility]   Is patient eligible?  Yes   Discharge diagnosis  Abdominal pain,    Acute diverticulitis/colitis of the sigmoid colon   Does the patient have one of the following disease processes/diagnoses(primary or secondary)?  Other   Does the patient have Home health ordered?  No   Is there a DME ordered?  No   Prep survey completed?  Yes          Callie Haider RN

## 2019-11-18 ENCOUNTER — READMISSION MANAGEMENT (OUTPATIENT)
Dept: CALL CENTER | Facility: HOSPITAL | Age: 79
End: 2019-11-18

## 2019-11-18 NOTE — OUTREACH NOTE
Medical Week 1 Survey      Responses   Facility patient discharged from?  [facility]   Does the patient have one of the following disease processes/diagnoses(primary or secondary)?  Other   Is there a successful TCM telephone encounter documented?  No   Week 1 attempt successful?  No   Rescheduled  Revoked   Revoke  Decline to participate [NO ANSWER, LEFT VM]          Henrietta Ac LPN

## 2019-11-29 PROCEDURE — 93010 ELECTROCARDIOGRAM REPORT: CPT | Performed by: INTERNAL MEDICINE

## 2020-01-06 ENCOUNTER — LAB (OUTPATIENT)
Dept: LAB | Facility: HOSPITAL | Age: 80
End: 2020-01-06

## 2020-01-06 ENCOUNTER — TRANSCRIBE ORDERS (OUTPATIENT)
Dept: ADMINISTRATIVE | Facility: HOSPITAL | Age: 80
End: 2020-01-06

## 2020-01-06 DIAGNOSIS — E87.8 ELECTROLYTE IMBALANCE: Primary | ICD-10-CM

## 2020-01-06 DIAGNOSIS — E87.8 ELECTROLYTE IMBALANCE: ICD-10-CM

## 2020-01-06 LAB — POTASSIUM BLD-SCNC: 4 MMOL/L (ref 3.5–5.2)

## 2020-01-06 PROCEDURE — 84132 ASSAY OF SERUM POTASSIUM: CPT

## 2020-01-06 PROCEDURE — 36415 COLL VENOUS BLD VENIPUNCTURE: CPT

## 2020-01-10 ENCOUNTER — OFFICE (AMBULATORY)
Dept: URBAN - METROPOLITAN AREA CLINIC 64 | Facility: CLINIC | Age: 80
End: 2020-01-10

## 2020-01-10 VITALS
SYSTOLIC BLOOD PRESSURE: 107 MMHG | DIASTOLIC BLOOD PRESSURE: 69 MMHG | HEIGHT: 66 IN | HEART RATE: 68 BPM | WEIGHT: 182 LBS

## 2020-01-10 DIAGNOSIS — R10.32 LEFT LOWER QUADRANT PAIN: ICD-10-CM

## 2020-01-10 DIAGNOSIS — K57.30 DIVERTICULOSIS OF LARGE INTESTINE WITHOUT PERFORATION OR ABS: ICD-10-CM

## 2020-01-10 PROCEDURE — 99213 OFFICE O/P EST LOW 20 MIN: CPT | Performed by: NURSE PRACTITIONER

## 2020-05-04 ENCOUNTER — TELEPHONE (OUTPATIENT)
Dept: CARDIOLOGY | Facility: CLINIC | Age: 80
End: 2020-05-04

## 2020-08-25 ENCOUNTER — OFFICE VISIT (OUTPATIENT)
Dept: CARDIOLOGY | Facility: CLINIC | Age: 80
End: 2020-08-25

## 2020-08-25 VITALS
BODY MASS INDEX: 28.72 KG/M2 | HEART RATE: 84 BPM | OXYGEN SATURATION: 98 % | HEIGHT: 67 IN | DIASTOLIC BLOOD PRESSURE: 77 MMHG | WEIGHT: 183 LBS | SYSTOLIC BLOOD PRESSURE: 115 MMHG

## 2020-08-25 DIAGNOSIS — I48.19 ATRIAL FIBRILLATION, PERSISTENT (HCC): Primary | ICD-10-CM

## 2020-08-25 DIAGNOSIS — I49.5 TACHYCARDIA-BRADYCARDIA (HCC): ICD-10-CM

## 2020-08-25 DIAGNOSIS — E87.8 ELECTROLYTE IMBALANCE: ICD-10-CM

## 2020-08-25 DIAGNOSIS — I10 ESSENTIAL HYPERTENSION: ICD-10-CM

## 2020-08-25 DIAGNOSIS — E78.5 DYSLIPIDEMIA: ICD-10-CM

## 2020-08-25 PROCEDURE — 99214 OFFICE O/P EST MOD 30 MIN: CPT | Performed by: INTERNAL MEDICINE

## 2020-08-25 PROCEDURE — 93000 ELECTROCARDIOGRAM COMPLETE: CPT | Performed by: INTERNAL MEDICINE

## 2020-08-25 RX ORDER — LOSARTAN POTASSIUM 50 MG/1
50 TABLET ORAL DAILY
COMMUNITY
End: 2020-09-08

## 2020-08-25 NOTE — PROGRESS NOTES
Encounter Date:08/25/2020  Last seen 4/23/2019      Patient ID: Kingsley Hunt is a 79 y.o. female.    Chief Complaint:  Tacky-bradycardia syndrome  Hypertension  Dyslipidemia  Hypothyroidism    History of Present Illness    Since I have last seen, the patient has been without any chest discomfort ,shortness of breath, palpitations, dizziness or syncope.  Denies having any headache ,abdominal pain ,nausea, vomiting , diarrhea constipation, loss of weight or loss of appetite.  Denies having any excessive bruising ,hematuria or blood in the stool.    Review of all systems negative except as indicated    Assessment and Plan       ]]]]]]]]]]]]]]]]]]]]]]    Impression  ===========  - New onset atrial fibrillation with moderate ventricular response     -History of tachy-clay syndrome.  Patient has history of SVT and she had sinus bradycardia heart rates in the 44 per minute.     -hypothyroidism hypertension dyslipidemia .  Borderline diabetes     -status post appendectomy     Family history is positive for coronary artery disease     -allergic to morphine.  Patient apparently does not have any problems with allergy to morphine anymore     -former smoker.  Quit 30 years ago  ===============  Plan  ==========   EKG showed atrial fibrillation with moderate ventricular response.  EKG 11/14/2019 also showed atrial fibrillation  Tachybradycardia syndrome  Medications were reviewed and gated.  Continue acebutolol 200 mg p.o. twice a day.  Patient was asked to go on anticoagulants due to atrial fibrillation.  Risks and benefits pros and cons were discussed.  Option of anticoagulants was discussed.  Patient does not want to go on Coumadin.  Patient was started on Eliquis 5 mg a day.  Hold losartan.  Start metoprolol XL 25 mg a day.  May discontinue metoprolol if patient has bradycardia especially being on acebutolol.  Instead patient may be started on Cardizem CD.  Follow-up in the office in 2 weeks with EKG  I have discussed  with her regarding possibility of needing permanent pacemaker in the future if she has problems with tachy-clay episodes.  Further plan will depend on patient's progress.  ]]]]]]]]]]]]]]]]]            Diagnosis Plan   1. Electrolyte imbalance     2. Essential hypertension     3. Dyslipidemia     4. Tachycardia-bradycardia (CMS/HCC)     LAB RESULTS (LAST 7 DAYS)    CBC        BMP        CMP         BNP        TROPONIN        CoAg        Creatinine Clearance  CrCl cannot be calculated (Patient's most recent lab result is older than the maximum 30 days allowed.).    ABG        Radiology  No radiology results for the last day                The following portions of the patient's history were reviewed and updated as appropriate: allergies, current medications, past family history, past medical history, past social history, past surgical history and problem list.    Review of Systems   Constitution: Positive for malaise/fatigue.   Cardiovascular: Positive for palpitations. Negative for chest pain, leg swelling and syncope.   Respiratory: Positive for shortness of breath.    Skin: Negative for rash.   Gastrointestinal: Negative for nausea and vomiting.   Neurological: Positive for dizziness and light-headedness. Negative for numbness.         Current Outpatient Medications:   •  acebutolol (SECTRAL) 200 MG capsule, Take 200 mg by mouth 2 (Two) Times a Day., Disp: , Rfl:   •  aspirin 81 MG EC tablet, Take 81 mg by mouth Daily., Disp: , Rfl:   •  Cholecalciferol (VITAMIN D3) 125 MCG (5000 UT) capsule capsule, Take 5,000 Units by mouth Daily., Disp: , Rfl:   •  levothyroxine (SYNTHROID, LEVOTHROID) 50 MCG tablet, Take 50 mcg by mouth Daily., Disp: , Rfl:   •  losartan (COZAAR) 50 MG tablet, Take 50 mg by mouth Daily., Disp: , Rfl:   •  Multiple Vitamins-Minerals (MULTIVITAMIN WITH MINERALS) tablet tablet, Take 1 tablet by mouth Daily., Disp: , Rfl:   •  polyethylene glycol (MIRALAX) packet, Take 17 g by mouth Daily., Disp:  , Rfl:   •  vitamin B-12 (CYANOCOBALAMIN) 1000 MCG tablet, Take 1,000 mcg by mouth Daily., Disp: , Rfl:   •  Wheat Dextrin (BENEFIBER DRINK MIX PO), Take 1 package by mouth Daily., Disp: , Rfl:   •  cortisone (CORTONE) 25 MG tablet, Take 25 mg by mouth Daily., Disp: , Rfl:   •  docusate sodium (COLACE) 100 MG capsule, Take 100 mg by mouth 2 (Two) Times a Day., Disp: , Rfl:   •  Iodine, Kelp, 0.15 MG tablet, Take 1 tablet by mouth Daily., Disp: , Rfl:   •  levoFLOXacin (LEVAQUIN) 750 MG tablet, Take 750 mg by mouth Daily., Disp: , Rfl:   •  losartan 50 MG tablet 100 mg, hydroCHLOROthiazide 12.5 MG 12.5 mg, Take 1 dose by mouth Daily., Disp: , Rfl:   •  magnesium hydroxide (MILK OF MAGNESIA) 400 MG/5ML suspension, Take 30 mL by mouth Daily As Needed for Constipation., Disp: , Rfl:   •  metroNIDAZOLE (FLAGYL) 500 MG tablet, Take 500 mg by mouth 3 (Three) Times a Day., Disp: , Rfl:     No Known Allergies    Family History   Problem Relation Age of Onset   • Cancer Mother    • Diabetes Sister    • Heart disease Sister    • Diabetes Son        Past Surgical History:   Procedure Laterality Date   • APPENDECTOMY     • COLONOSCOPY     • ENDOSCOPY     • EYE SURGERY     • SKIN BIOPSY         Past Medical History:   Diagnosis Date   • Arthritis    • Cancer (CMS/HCC)     Skin Cancer   • Disease of thyroid gland    • Elevated cholesterol    • GERD (gastroesophageal reflux disease)    • Hypertension        Family History   Problem Relation Age of Onset   • Cancer Mother    • Diabetes Sister    • Heart disease Sister    • Diabetes Son        Social History     Socioeconomic History   • Marital status:      Spouse name: Not on file   • Number of children: Not on file   • Years of education: Not on file   • Highest education level: Not on file   Tobacco Use   • Smoking status: Former Smoker   • Smokeless tobacco: Never Used   • Tobacco comment: Quit 30+ years ago   Substance and Sexual Activity   • Alcohol use: Not Currently  "    Comment: 4 glasses in the last month    • Drug use: No   • Sexual activity: Defer           ECG 12 Lead  Date/Time: 8/25/2020 1:57 PM  Performed by: Kieran Hines MD  Authorized by: Kieran Hines MD   Comparison: compared with previous ECG   Similar to previous ECG  Comparison to previous ECG: Atrial fibrillation with controlled ventricular response 76/min nonspecific ST-T wave changes normal axis normal intervals no ectopy no change from 11/14/2019                Objective:       Physical Exam    /77   Pulse 84   Ht 170.2 cm (67\")   Wt 83 kg (183 lb)   SpO2 98%   BMI 28.66 kg/m²   The patient is alert, oriented and in no distress.    Vital signs as noted above.    Head and neck revealed no carotid bruits or jugular venous distension.  No thyromegaly or lymphadenopathy is present.    Lungs clear.  No wheezing.  Breath sounds are normal bilaterally.    Heart normal first and second heart sounds.  No murmur..  No pericardial rub is present.  No gallop is present.    Abdomen soft and nontender.  No organomegaly is present.    Extremities revealed good peripheral pulses without any pedal edema.    Skin warm and dry.    Musculoskeletal system is grossly normal.    CNS grossly normal.        "

## 2020-08-28 ENCOUNTER — TELEPHONE (OUTPATIENT)
Dept: CARDIOLOGY | Facility: CLINIC | Age: 80
End: 2020-08-28

## 2020-08-28 NOTE — TELEPHONE ENCOUNTER
Please asked him to price other anticoagulants other than Coumadin and see if she can afford any other medications other than Eliquis.

## 2020-08-28 NOTE — TELEPHONE ENCOUNTER
Pharmacist from [facility] called, patient cannot afford Eliquis for a 30 day supply or 90 day, patient wanting to switch due to cost. Patient on Eliquis due to A-fib. Called pharmacy to advised that Dr. Hines was out of office today, would put message in.     Please advise?

## 2020-08-31 NOTE — TELEPHONE ENCOUNTER
Patient called this morning, advised to call insurance company or pharmacy to see what other blood thinners they would cover besides coumadin, and then call us back so we can get a rx sent in for whatever is the cheapest. Patient understood.

## 2020-09-01 ENCOUNTER — TELEPHONE (OUTPATIENT)
Dept: CARDIOLOGY | Facility: CLINIC | Age: 80
End: 2020-09-01

## 2020-09-01 NOTE — TELEPHONE ENCOUNTER
Thinks her heart is back in rhythm, asking for samples of Eliquis until she comes in for 9/8 apt.

## 2020-09-01 NOTE — TELEPHONE ENCOUNTER
Patient called, stated that she wants to just stick with the Eliquis due to Xarelto being just as expensive. Patient has appt next week and wants to discuss with Dr. Hines then. Samples of eliquis upfront waiting for patient.

## 2020-09-08 ENCOUNTER — OFFICE VISIT (OUTPATIENT)
Dept: CARDIOLOGY | Facility: CLINIC | Age: 80
End: 2020-09-08

## 2020-09-08 VITALS
HEIGHT: 67 IN | BODY MASS INDEX: 28.96 KG/M2 | DIASTOLIC BLOOD PRESSURE: 75 MMHG | SYSTOLIC BLOOD PRESSURE: 126 MMHG | OXYGEN SATURATION: 97 % | WEIGHT: 184.5 LBS | HEART RATE: 79 BPM

## 2020-09-08 DIAGNOSIS — I10 ESSENTIAL HYPERTENSION: Primary | ICD-10-CM

## 2020-09-08 DIAGNOSIS — E78.5 DYSLIPIDEMIA: ICD-10-CM

## 2020-09-08 DIAGNOSIS — I49.5 TACHYCARDIA-BRADYCARDIA (HCC): ICD-10-CM

## 2020-09-08 DIAGNOSIS — I48.19 ATRIAL FIBRILLATION, PERSISTENT (HCC): ICD-10-CM

## 2020-09-08 PROCEDURE — 93000 ELECTROCARDIOGRAM COMPLETE: CPT | Performed by: INTERNAL MEDICINE

## 2020-09-08 PROCEDURE — 99214 OFFICE O/P EST MOD 30 MIN: CPT | Performed by: INTERNAL MEDICINE

## 2020-09-08 RX ORDER — HYDROCHLOROTHIAZIDE 12.5 MG/1
12.5 TABLET ORAL DAILY
COMMUNITY
End: 2020-09-11

## 2020-09-08 RX ORDER — HYDROCHLOROTHIAZIDE 12.5 MG/1
12.5 TABLET ORAL DAILY
COMMUNITY
Start: 2020-08-04 | End: 2023-03-20

## 2020-09-08 RX ORDER — MULTIVIT-MIN/IRON/FOLIC ACID/K 18-600-40
500 CAPSULE ORAL DAILY
COMMUNITY
End: 2023-03-20

## 2020-09-08 RX ORDER — METOPROLOL SUCCINATE 25 MG/1
TABLET, EXTENDED RELEASE ORAL
COMMUNITY
Start: 2020-08-25 | End: 2020-09-08

## 2020-09-08 RX ORDER — ZINC GLUCONATE 50 MG
50 TABLET ORAL DAILY
COMMUNITY
End: 2023-03-20

## 2020-09-08 RX ORDER — APIXABAN 5 MG/1
5 TABLET, FILM COATED ORAL EVERY 12 HOURS SCHEDULED
COMMUNITY
Start: 2020-08-31 | End: 2020-09-11 | Stop reason: SDUPTHER

## 2020-09-08 RX ORDER — LOSARTAN POTASSIUM 100 MG/1
100 TABLET ORAL DAILY
COMMUNITY
Start: 2020-08-04 | End: 2023-03-20

## 2020-09-08 RX ORDER — CHOLECALCIFEROL (VITAMIN D3) 125 MCG
5 CAPSULE ORAL NIGHTLY
COMMUNITY
End: 2021-01-12

## 2020-09-08 NOTE — PROGRESS NOTES
Encounter Date:09/08/2020      Patient ID: Kingsley Hunt is a 79 y.o. female.    Chief Complaint:    Tachy-bradycardia syndrome  Hypertension  Dyslipidemia  Hypothyroidism  Persistent atrial fibrillation     History of Present Illness     Since I have last seen, the patient has been without any chest discomfort ,shortness of breath, palpitations, dizziness or syncope.  Denies having any headache ,abdominal pain ,nausea, vomiting , diarrhea constipation, loss of weight or loss of appetite.  Denies having any excessive bruising ,hematuria or blood in the stool.     Review of all systems negative except as indicated     Assessment and Plan         ]]]]]]]]]]]]]]]]]]]]]]    Impression  ===========  - New onset and persistent atrial fibrillation with moderate ventricular response      -History of tachy-clay syndrome.  Patient has history of SVT and she had sinus bradycardia heart rates in the 44 per minute.     -hypothyroidism hypertension dyslipidemia .  Borderline diabetes     -status post appendectomy     Family history is positive for coronary artery disease     -allergic to morphine.  Patient apparently does not have any problems with allergy to morphine anymore     -former smoker.  Quit 30 years ago  ===============  Plan  ==========  EKG showed atrial fibrillation with moderate ventricular response.  Tachybradycardia syndrome  Medications were reviewed and updated  ontinue acebutolol 200 mg p.o. twice a day.  Discontinue metoprolol  Anticoagulation status was reviewed.  Continue Eliquis 5 mg twice a day.  Patient was provided with samples.  Continue to hold losartan  Options were discussed.  Patient prefers to have cardioversion  Risks and benefits pros and cons of the procedure were discussed with patient.  I have discussed with her regarding possibility of needing permanent pacemaker in the future if she has problems with tachy-clay episodes.  Further plan will depend on patient's  progress.  ]]]]]]]]]]]]]]]]]              Diagnosis Plan   1. Essential hypertension  COVID PRE-OP / PRE-PROCEDURE SCREENING ORDER (NO ISOLATION) - Swab, Nasopharynx    Cardioversion External in Cardiology Department    CBC (No Diff)    Protime-INR    aPTT    ECG 12 Lead    Basic Metabolic Panel   2. Dyslipidemia  COVID PRE-OP / PRE-PROCEDURE SCREENING ORDER (NO ISOLATION) - Swab, Nasopharynx    Cardioversion External in Cardiology Department    CBC (No Diff)    Protime-INR    aPTT    ECG 12 Lead    Basic Metabolic Panel   3. Tachycardia-bradycardia (CMS/HCC)  COVID PRE-OP / PRE-PROCEDURE SCREENING ORDER (NO ISOLATION) - Swab, Nasopharynx    Cardioversion External in Cardiology Department    CBC (No Diff)    Protime-INR    aPTT    ECG 12 Lead    Basic Metabolic Panel   4. Atrial fibrillation, persistent (CMS/HCC)  COVID PRE-OP / PRE-PROCEDURE SCREENING ORDER (NO ISOLATION) - Swab, Nasopharynx    Cardioversion External in Cardiology Department    CBC (No Diff)    Protime-INR    aPTT    ECG 12 Lead    Basic Metabolic Panel   LAB RESULTS (LAST 7 DAYS)    CBC        BMP        CMP         BNP        TROPONIN        CoAg        Creatinine Clearance  CrCl cannot be calculated (Patient's most recent lab result is older than the maximum 30 days allowed.).    ABG        Radiology  No radiology results for the last day                The following portions of the patient's history were reviewed and updated as appropriate: allergies, current medications, past family history, past medical history, past social history, past surgical history and problem list.    Review of Systems   Constitution: Negative for malaise/fatigue.   Cardiovascular: Negative for chest pain, leg swelling, palpitations and syncope.   Respiratory: Negative for shortness of breath.    Skin: Negative for rash.   Gastrointestinal: Negative for nausea and vomiting.   Neurological: Negative for dizziness, light-headedness and numbness.         Current  Outpatient Medications:   •  acebutolol (SECTRAL) 200 MG capsule, Take 200 mg by mouth 2 (Two) Times a Day., Disp: , Rfl:   •  aspirin 81 MG EC tablet, Take 81 mg by mouth Daily., Disp: , Rfl:   •  Cholecalciferol (VITAMIN D3) 125 MCG (5000 UT) capsule capsule, Take 5,000 Units by mouth Daily., Disp: , Rfl:   •  docusate sodium (COLACE) 100 MG capsule, Take 100 mg by mouth 2 (Two) Times a Day., Disp: , Rfl:   •  ELIQUIS 5 MG tablet tablet, Take 5 mg by mouth Every 12 (Twelve) Hours., Disp: , Rfl:   •  hydroCHLOROthiazide (HYDRODIURIL) 12.5 MG tablet, TAKE ONE (1) TABLET BY MOUTH EVERY DAY, Disp: , Rfl:   •  levothyroxine (SYNTHROID, LEVOTHROID) 50 MCG tablet, Take 50 mcg by mouth Daily., Disp: , Rfl:   •  losartan (COZAAR) 100 MG tablet, Take 100 mg by mouth Daily., Disp: , Rfl:   •  polyethylene glycol (MIRALAX) packet, Take 17 g by mouth Daily As Needed., Disp: , Rfl:   •  Ascorbic Acid (VITAMIN C) 500 MG capsule, Take 500 mg by mouth Daily., Disp: , Rfl:   •  hydroCHLOROthiazide (HYDRODIURIL) 12.5 MG tablet, Take 12.5 mg by mouth Daily., Disp: , Rfl:   •  melatonin 5 MG tablet tablet, Take 5 mg by mouth Every Night., Disp: , Rfl:   •  Zinc 50 MG tablet, Take 50 mg by mouth Daily., Disp: , Rfl:     No Known Allergies    Family History   Problem Relation Age of Onset   • Cancer Mother    • Diabetes Sister    • Heart disease Sister    • Diabetes Son        Past Surgical History:   Procedure Laterality Date   • APPENDECTOMY     • COLONOSCOPY     • ENDOSCOPY     • EYE SURGERY     • SKIN BIOPSY         Past Medical History:   Diagnosis Date   • Arthritis    • Cancer (CMS/HCC)     Skin Cancer   • Disease of thyroid gland    • Elevated cholesterol    • GERD (gastroesophageal reflux disease)    • Hypertension        Family History   Problem Relation Age of Onset   • Cancer Mother    • Diabetes Sister    • Heart disease Sister    • Diabetes Son        Social History     Socioeconomic History   • Marital status:       "Spouse name: Not on file   • Number of children: Not on file   • Years of education: Not on file   • Highest education level: Not on file   Tobacco Use   • Smoking status: Former Smoker   • Smokeless tobacco: Never Used   • Tobacco comment: Quit 30+ years ago   Substance and Sexual Activity   • Alcohol use: Not Currently     Comment: 4 glasses in the last month    • Drug use: No   • Sexual activity: Defer           ECG 12 Lead  Date/Time: 9/8/2020 4:55 PM  Performed by: Kieran Hines MD  Authorized by: Kieran Hines MD   Comparison: compared with previous ECG   Similar to previous ECG  Comparison to previous ECG: Atrial fibrillation with controlled ventricular response 76/min nonspecific ST-T wave changes normal axis normal intervals no ectopy no change from 8/25/2020                Objective:       Physical Exam    /75   Pulse 79   Ht 170.2 cm (67\")   Wt 83.7 kg (184 lb 8 oz)   SpO2 97%   BMI 28.90 kg/m²   The patient is alert, oriented and in no distress.    Vital signs as noted above.    Head and neck revealed no carotid bruits or jugular venous distension.  No thyromegaly or lymphadenopathy is present.    Lungs clear.  No wheezing.  Breath sounds are normal bilaterally.    Heart normal first and second heart sounds.  No murmur..  No pericardial rub is present.  No gallop is present.    Abdomen soft and nontender.  No organomegaly is present.    Extremities revealed good peripheral pulses without any pedal edema.    Skin warm and dry.    Musculoskeletal system is grossly normal.    CNS grossly normal.        "

## 2020-09-09 ENCOUNTER — LAB (OUTPATIENT)
Dept: LAB | Facility: HOSPITAL | Age: 80
End: 2020-09-09

## 2020-09-09 ENCOUNTER — TELEPHONE (OUTPATIENT)
Dept: CARDIOLOGY | Facility: CLINIC | Age: 80
End: 2020-09-09

## 2020-09-09 DIAGNOSIS — I10 ESSENTIAL HYPERTENSION: ICD-10-CM

## 2020-09-09 DIAGNOSIS — I49.5 TACHYCARDIA-BRADYCARDIA (HCC): ICD-10-CM

## 2020-09-09 DIAGNOSIS — I48.19 ATRIAL FIBRILLATION, PERSISTENT (HCC): ICD-10-CM

## 2020-09-09 DIAGNOSIS — E78.5 DYSLIPIDEMIA: ICD-10-CM

## 2020-09-09 LAB
APTT PPP: 27.9 SECONDS (ref 24–31)
DEPRECATED RDW RBC AUTO: 41.5 FL (ref 37–54)
ERYTHROCYTE [DISTWIDTH] IN BLOOD BY AUTOMATED COUNT: 11.6 % (ref 12.3–15.4)
HCT VFR BLD AUTO: 36.4 % (ref 34–46.6)
HGB BLD-MCNC: 12.7 G/DL (ref 12–15.9)
INR PPP: 1.05 (ref 0.93–1.1)
MCH RBC QN AUTO: 34 PG (ref 26.6–33)
MCHC RBC AUTO-ENTMCNC: 34.9 G/DL (ref 31.5–35.7)
MCV RBC AUTO: 97.6 FL (ref 79–97)
PLATELET # BLD AUTO: 282 10*3/MM3 (ref 140–450)
PMV BLD AUTO: 11 FL (ref 6–12)
PROTHROMBIN TIME: 11.4 SECONDS (ref 9.6–11.7)
RBC # BLD AUTO: 3.73 10*6/MM3 (ref 3.77–5.28)
WBC # BLD AUTO: 3.69 10*3/MM3 (ref 3.4–10.8)

## 2020-09-09 PROCEDURE — 85027 COMPLETE CBC AUTOMATED: CPT

## 2020-09-09 PROCEDURE — 36415 COLL VENOUS BLD VENIPUNCTURE: CPT

## 2020-09-09 PROCEDURE — 85730 THROMBOPLASTIN TIME PARTIAL: CPT

## 2020-09-09 PROCEDURE — U0004 COV-19 TEST NON-CDC HGH THRU: HCPCS

## 2020-09-09 PROCEDURE — 85610 PROTHROMBIN TIME: CPT

## 2020-09-09 PROCEDURE — C9803 HOPD COVID-19 SPEC COLLECT: HCPCS

## 2020-09-09 NOTE — TELEPHONE ENCOUNTER
Yes.  At least for couple of weeks.  Patient can be given samples if available to cover at least that 2 weeks.

## 2020-09-09 NOTE — TELEPHONE ENCOUNTER
Pt to have a cardioversion 9/11, asking if eliquis will be dc at that time.  She has 1 week supply at home but does not want to buy any more if she is to dc med  [phone]

## 2020-09-09 NOTE — TELEPHONE ENCOUNTER
Spoke with pt - advised will need to stay on Eliquis for at least 2 weeks post Cardioversion and will give samples.   Should be getting samples today - will call patient when ready for .   Pt had COVID test done for procedure and needs to quarantine, will need to walk samples to car for patient.

## 2020-09-10 LAB — SARS-COV-2 RNA NOSE QL NAA+PROBE: NOT DETECTED

## 2020-09-11 ENCOUNTER — ANESTHESIA (OUTPATIENT)
Dept: CARDIOLOGY | Facility: HOSPITAL | Age: 80
End: 2020-09-11

## 2020-09-11 ENCOUNTER — HOSPITAL ENCOUNTER (OUTPATIENT)
Dept: CARDIOLOGY | Facility: HOSPITAL | Age: 80
Discharge: HOME | End: 2020-09-11

## 2020-09-11 ENCOUNTER — ANESTHESIA EVENT (OUTPATIENT)
Dept: CARDIOLOGY | Facility: HOSPITAL | Age: 80
End: 2020-09-11

## 2020-09-11 VITALS — SYSTOLIC BLOOD PRESSURE: 156 MMHG | OXYGEN SATURATION: 100 % | DIASTOLIC BLOOD PRESSURE: 72 MMHG

## 2020-09-11 VITALS
SYSTOLIC BLOOD PRESSURE: 110 MMHG | WEIGHT: 181.44 LBS | HEART RATE: 82 BPM | TEMPERATURE: 97.3 F | BODY MASS INDEX: 30.23 KG/M2 | OXYGEN SATURATION: 99 % | RESPIRATION RATE: 13 BRPM | HEIGHT: 65 IN | DIASTOLIC BLOOD PRESSURE: 61 MMHG

## 2020-09-11 DIAGNOSIS — I48.19 ATRIAL FIBRILLATION, PERSISTENT (HCC): ICD-10-CM

## 2020-09-11 DIAGNOSIS — I10 ESSENTIAL HYPERTENSION: ICD-10-CM

## 2020-09-11 DIAGNOSIS — E78.5 DYSLIPIDEMIA: ICD-10-CM

## 2020-09-11 DIAGNOSIS — I49.5 TACHYCARDIA-BRADYCARDIA (HCC): ICD-10-CM

## 2020-09-11 LAB — GLUCOSE BLDC GLUCOMTR-MCNC: 127 MG/DL (ref 70–105)

## 2020-09-11 PROCEDURE — 93005 ELECTROCARDIOGRAM TRACING: CPT | Performed by: INTERNAL MEDICINE

## 2020-09-11 PROCEDURE — 92960 CARDIOVERSION ELECTRIC EXT: CPT | Performed by: INTERNAL MEDICINE

## 2020-09-11 PROCEDURE — 25010000002 PROPOFOL 10 MG/ML EMULSION: Performed by: ANESTHESIOLOGY

## 2020-09-11 PROCEDURE — 82962 GLUCOSE BLOOD TEST: CPT

## 2020-09-11 PROCEDURE — 92960 CARDIOVERSION ELECTRIC EXT: CPT

## 2020-09-11 RX ORDER — PROPOFOL 10 MG/ML
VIAL (ML) INTRAVENOUS AS NEEDED
Status: DISCONTINUED | OUTPATIENT
Start: 2020-09-11 | End: 2020-09-11 | Stop reason: SURG

## 2020-09-11 RX ORDER — SODIUM CHLORIDE 9 MG/ML
30 INJECTION, SOLUTION INTRAVENOUS CONTINUOUS
Status: DISCONTINUED | OUTPATIENT
Start: 2020-09-11 | End: 2020-09-12 | Stop reason: HOSPADM

## 2020-09-11 RX ADMIN — SODIUM CHLORIDE 30 ML/HR: 900 INJECTION, SOLUTION INTRAVENOUS at 10:12

## 2020-09-11 RX ADMIN — PROPOFOL 50 MG: 10 INJECTION, EMULSION INTRAVENOUS at 10:33

## 2020-09-11 NOTE — DISCHARGE INSTR - ACTIVITY
1) The medication, which was used to put the patient to sleep, will be acting in your body for the next twenty-four (24) hours, so you might feel a little sleepy; this feeling will slowly wear off.  Because the medicine is still in your system for the next twenty-four (24) hours, the adult patient SHOULD NOT:    a. Drive a car, operate machinery, or power tools  b. Drink any alcoholic drinks (not even beer)  c. Make any important decisions such as to sign important papers  d. Eat or Drink for *** hours (It may be better to start with liquids such as soft drinks, then soups, and graduate up to solid foods)    2) We strongly suggest that a responsible adult be with the patient the rest of the day.

## 2020-09-11 NOTE — ANESTHESIA PREPROCEDURE EVALUATION
Anesthesia Evaluation     Patient summary reviewed and Nursing notes reviewed   no history of anesthetic complications:  NPO Solid Status: > 8 hours  NPO Liquid Status: > 8 hours           Airway   Dental      Pulmonary    (+) a smoker Former,   Cardiovascular     ECG reviewed  PT is on anticoagulation therapy    (+) hypertension, dysrhythmias Atrial Fib, Bradycardia, Tachycardia, hyperlipidemia,       Neuro/Psych  GI/Hepatic/Renal/Endo    (+) obesity,  GERD,  thyroid problem hypothyroidism    Musculoskeletal     Abdominal    Substance History      OB/GYN          Other   arthritis,    history of cancer    ROS/Med Hx Other: Skin cancer    PSH  ENDOSCOPY COLONOSCOPY  APPENDECTOMY EYE SURGERY  SKIN BIOPSY                   Anesthesia Plan    ASA 3     MAC   (Patient identified; pre-operative vital signs, all relevant labs/studies, complete medical/surgical/anesthetic history, full medication list, full allergy list, and NPO status obtained/reviewed; physical assessment performed; anesthetic options, side effects, potential complications, risks, and benefits discussed; questions answered; written anesthesia consent obtained; patient cleared for procedure; anesthesia machine and equipment checked and functioning)    Anesthetic plan, all risks, benefits, and alternatives have been provided, discussed and informed consent has been obtained with: patient.

## 2020-09-11 NOTE — ANESTHESIA POSTPROCEDURE EVALUATION
Patient: Kingsley Hunt    Procedure Summary     Date:  09/11/20 Room / Location:  [facility]    Anesthesia Start:  1030 Anesthesia Stop:  1036    Procedure:  CARDIOVERSION EXTERNAL IN CARDIOLOGY DEPARTMENT Diagnosis:       Essential hypertension      Dyslipidemia      Tachycardia-bradycardia (CMS/HCC)      Atrial fibrillation, persistent (CMS/HCC)      (Atrial fibrillation)    Scheduled Providers:  Kieran Hines MD Provider:  Pritesh Smith MD    Anesthesia Type:  MAC ASA Status:  3          Anesthesia Type: MAC    Vitals  Vitals Value Taken Time   /72 9/11/2020 10:35 AM   Temp     Pulse     Resp     SpO2 100 % 9/11/2020 10:35 AM           Post Anesthesia Care and Evaluation    Patient location during evaluation: PACU  Patient participation: complete - patient participated  Level of consciousness: awake  Pain scale: See nurse's notes for pain score.  Pain management: adequate  Airway patency: patent  Anesthetic complications: No anesthetic complications  PONV Status: none  Cardiovascular status: acceptable  Respiratory status: acceptable  Hydration status: acceptable    Comments: Patient seen and examined postoperatively; vital signs stable; SpO2 greater than or equal to 90%; cardiopulmonary status stable; nausea/vomiting adequately controlled; pain adequately controlled; no apparent anesthesia complications; patient discharged from anesthesia care when discharge criteria were met

## 2020-09-12 PROCEDURE — 93010 ELECTROCARDIOGRAM REPORT: CPT | Performed by: INTERNAL MEDICINE

## 2020-09-14 ENCOUNTER — TELEPHONE (OUTPATIENT)
Dept: CARDIOLOGY | Facility: CLINIC | Age: 80
End: 2020-09-14

## 2020-09-14 NOTE — TELEPHONE ENCOUNTER
Pt asking about Eliquis samples advised still not in and not sure when we will have them.   Says since cardioversion she has had a dull headache, cough, and wheezing.   Says she has had a few flutters since cardioversion and she wants to go to two weddings in [place] and [place]. Will leave Wednesday to drive.   Advised PCP will need to address wheezing and coughing most likely but will transfer to sched for appointment today or tomorrow for flutters and talk about traveling.   Understood.

## 2020-09-15 ENCOUNTER — OFFICE VISIT (OUTPATIENT)
Dept: CARDIOLOGY | Facility: CLINIC | Age: 80
End: 2020-09-15

## 2020-09-15 VITALS
HEART RATE: 56 BPM | BODY MASS INDEX: 30.82 KG/M2 | OXYGEN SATURATION: 96 % | WEIGHT: 185 LBS | HEIGHT: 65 IN | DIASTOLIC BLOOD PRESSURE: 78 MMHG | SYSTOLIC BLOOD PRESSURE: 135 MMHG

## 2020-09-15 DIAGNOSIS — I48.19 ATRIAL FIBRILLATION, PERSISTENT (HCC): ICD-10-CM

## 2020-09-15 DIAGNOSIS — E78.5 DYSLIPIDEMIA: ICD-10-CM

## 2020-09-15 DIAGNOSIS — I10 ESSENTIAL HYPERTENSION: Primary | ICD-10-CM

## 2020-09-15 DIAGNOSIS — I49.5 TACHYCARDIA-BRADYCARDIA (HCC): ICD-10-CM

## 2020-09-15 PROCEDURE — 93000 ELECTROCARDIOGRAM COMPLETE: CPT | Performed by: INTERNAL MEDICINE

## 2020-09-15 PROCEDURE — 99214 OFFICE O/P EST MOD 30 MIN: CPT | Performed by: INTERNAL MEDICINE

## 2020-09-15 RX ORDER — UBIDECARENONE 75 MG
50 CAPSULE ORAL DAILY
COMMUNITY
End: 2023-03-20

## 2020-09-15 RX ORDER — MULTIVITAMIN WITH IRON
100 TABLET ORAL DAILY
COMMUNITY
End: 2023-03-20

## 2020-09-15 NOTE — PROGRESS NOTES
Encounter Date:09/15/2020  Last seen 9/8/2020 and cardioversion 9/11/2020      Patient ID: Kingsley Hunt is a 79 y.o. female.    Chief Complaint:  Tachy-bradycardia syndrome  Hypertension  Dyslipidemia  Hypothyroidism  Persistent atrial fibrillation  Status post cardioversion 9/11/2020     History of Present Illness  Follow-up for recent cardioversion.  Patient had cardioversion from atrial fibrillation to sinus rhythm 9/11/2020  Patient has been having occasional palpitations fatigue and shortness of breath.  The symptoms are more since she had cardioversion.     Since I have last seen, the patient has been without any chest discomfort dizziness or syncope.  Denies having any headache ,abdominal pain ,nausea, vomiting , diarrhea constipation, loss of weight or loss of appetite.  Denies having any excessive bruising ,hematuria or blood in the stool.     Review of all systems negative except as indicated     Assessment and Plan         ]]]]]]]]]]]]]]]]]]]]]]    Impression  ===========  - New onset and persistent atrial fibrillation with moderate ventricular response.  Status post cardioversion 9/11/2020  Patient has converted and maintaining sinus rhythm.      -History of tachy-clay syndrome.  Patient has history of SVT and she had sinus bradycardia heart rates in the 44 per minute.     -hypothyroidism hypertension dyslipidemia .  Borderline diabetes     -status post appendectomy     Family history is positive for coronary artery disease     -allergic to morphine.  Patient apparently does not have any problems with allergy to morphine anymore     -former smoker.  Quit 30 years ago  ===============  Plan  ==========  Tachybradycardia syndrome.  Recent new onset persistent atrial fibrillation.  Status post cardioversion 9/11/2020.  EKG today showed sinus bradycardia without ischemic changes  Surprisingly patient is having occasional palpitations fatigue and shortness of breath.  Not sure of the exact etiology  especially since patient is maintaining sinus rhythm  Medications were reviewed and updated  Continue acebutolol 200 mg p.o. twice a day.  Not a new medication  Anticoagulation status was reviewed.  Continue Eliquis 5 mg twice a day.  Discontinue after 10 days  Continue to hold off on losartan.  Follow-up in the office in 2 weeks with EKG  I have discussed with her regarding possibility of needing permanent pacemaker in the future if she has problems with tachy-clay episodes.  Further plan will depend on patient's progress.  ]]]]]]]]]]]]]]]]]            Diagnosis Plan   1. Essential hypertension     2. Dyslipidemia     3. Tachycardia-bradycardia (CMS/HCC)     4. Atrial fibrillation, persistent (CMS/HCC)     LAB RESULTS (LAST 7 DAYS)    CBC  Results from last 7 days   Lab Units 09/09/20  0910   WBC 10*3/mm3 3.69   RBC 10*6/mm3 3.73*   HEMOGLOBIN g/dL 12.7   HEMATOCRIT % 36.4   MCV fL 97.6*   PLATELETS 10*3/mm3 282       BMP        CMP         BNP        TROPONIN        CoAg  Results from last 7 days   Lab Units 09/09/20  0910   INR  1.05   APTT seconds 27.9       Creatinine Clearance  CrCl cannot be calculated (Patient's most recent lab result is older than the maximum 30 days allowed.).    ABG        Radiology  No radiology results for the last day                The following portions of the patient's history were reviewed and updated as appropriate: allergies, current medications, past family history, past medical history, past social history, past surgical history and problem list.    Review of Systems   Constitution: Positive for malaise/fatigue.   Cardiovascular: Positive for palpitations. Negative for chest pain, leg swelling and syncope.   Respiratory: Positive for shortness of breath.    Skin: Negative for rash.   Gastrointestinal: Negative for nausea and vomiting.   Neurological: Negative for dizziness, light-headedness and numbness.         Current Outpatient Medications:   •  acebutolol (SECTRAL) 200 MG  capsule, Take 200 mg by mouth 2 (Two) Times a Day., Disp: , Rfl:   •  Ascorbic Acid (VITAMIN C) 500 MG capsule, Take 500 mg by mouth Daily., Disp: , Rfl:   •  aspirin 81 MG EC tablet, Take 81 mg by mouth Daily., Disp: , Rfl:   •  Cholecalciferol (VITAMIN D3) 125 MCG (5000 UT) capsule capsule, Take 5,000 Units by mouth Daily., Disp: , Rfl:   •  docusate sodium (COLACE) 100 MG capsule, Take 300 mg by mouth Daily., Disp: , Rfl:   •  hydroCHLOROthiazide (HYDRODIURIL) 12.5 MG tablet, 12.5 mg Daily., Disp: , Rfl:   •  levothyroxine (SYNTHROID, LEVOTHROID) 50 MCG tablet, Take 50 mcg by mouth Daily., Disp: , Rfl:   •  losartan (COZAAR) 100 MG tablet, Take 100 mg by mouth Daily., Disp: , Rfl:   •  melatonin 5 MG tablet tablet, Take 5 mg by mouth Every Night., Disp: , Rfl:   •  polyethylene glycol (MIRALAX) packet, Take 17 g by mouth Daily As Needed., Disp: , Rfl:   •  vitamin B-12 (CYANOCOBALAMIN) 100 MCG tablet, Take 50 mcg by mouth Daily., Disp: , Rfl:   •  vitamin B-6 (PYRIDOXINE) 100 MG tablet, Take 100 mg by mouth Daily., Disp: , Rfl:   •  Zinc 50 MG tablet, Take 50 mg by mouth Daily., Disp: , Rfl:   •  apixaban (Eliquis) 5 MG tablet tablet, Take 1 tablet by mouth Every 12 (Twelve) Hours., Disp: 56 tablet, Rfl: 0    No Known Allergies    Family History   Problem Relation Age of Onset   • Cancer Mother    • Diabetes Sister    • Heart disease Sister    • Diabetes Son        Past Surgical History:   Procedure Laterality Date   • APPENDECTOMY     • COLONOSCOPY     • ENDOSCOPY     • EYE SURGERY     • SKIN BIOPSY         Past Medical History:   Diagnosis Date   • Arthritis    • Cancer (CMS/HCC)     Skin Cancer   • Disease of thyroid gland    • Elevated cholesterol    • GERD (gastroesophageal reflux disease)    • Hypertension        Family History   Problem Relation Age of Onset   • Cancer Mother    • Diabetes Sister    • Heart disease Sister    • Diabetes Son        Social History     Socioeconomic History   • Marital status:  "     Spouse name: Not on file   • Number of children: Not on file   • Years of education: Not on file   • Highest education level: Not on file   Tobacco Use   • Smoking status: Former Smoker   • Smokeless tobacco: Never Used   • Tobacco comment: Quit 30+ years ago   Substance and Sexual Activity   • Alcohol use: Not Currently     Comment: 4 glasses in the last month    • Drug use: No   • Sexual activity: Defer           ECG 12 Lead    Date/Time: 9/15/2020 11:31 AM  Performed by: Kieran Hines MD  Authorized by: Kieran Hines MD   Comparison: compared with previous ECG   Similar to previous ECG  Comparison to previous ECG: Sinus bradycardia 56/min nonspecific ST-T wave changes normal axis normal intervals no ectopy no change from 9/11/2020                Objective:       Physical Exam    /78 (BP Location: Right arm, Patient Position: Sitting, Cuff Size: Large Adult)   Pulse 56   Ht 165.1 cm (65\")   Wt 83.9 kg (185 lb)   SpO2 96%   BMI 30.79 kg/m²   The patient is alert, oriented and in no distress.    Vital signs as noted above.    Head and neck revealed no carotid bruits or jugular venous distension.  No thyromegaly or lymphadenopathy is present.    Lungs clear.  No wheezing.  Breath sounds are normal bilaterally.    Heart normal first and second heart sounds.  No murmur..  No pericardial rub is present.  No gallop is present.    Abdomen soft and nontender.  No organomegaly is present.    Extremities revealed good peripheral pulses without any pedal edema.    Skin warm and dry.    Musculoskeletal system is grossly normal.    CNS grossly normal.    Unchanged from last visit        "

## 2020-09-16 ENCOUNTER — TRANSCRIBE ORDERS (OUTPATIENT)
Dept: ADMINISTRATIVE | Facility: HOSPITAL | Age: 80
End: 2020-09-16

## 2020-09-16 DIAGNOSIS — R00.2 PALPITATIONS: ICD-10-CM

## 2020-09-16 DIAGNOSIS — I48.91 ATRIAL FIBRILLATION, UNSPECIFIED TYPE (HCC): Primary | ICD-10-CM

## 2020-09-17 ENCOUNTER — HOSPITAL ENCOUNTER (OUTPATIENT)
Dept: RESPIRATORY THERAPY | Facility: HOSPITAL | Age: 80
Discharge: HOME | End: 2020-09-17
Admitting: FAMILY MEDICINE

## 2020-09-17 DIAGNOSIS — R00.2 PALPITATIONS: ICD-10-CM

## 2020-09-17 DIAGNOSIS — I48.91 ATRIAL FIBRILLATION, UNSPECIFIED TYPE (HCC): ICD-10-CM

## 2020-09-17 PROCEDURE — 93225 XTRNL ECG REC<48 HRS REC: CPT

## 2020-09-23 PROCEDURE — 93227 XTRNL ECG REC<48 HR R&I: CPT | Performed by: INTERNAL MEDICINE

## 2020-10-01 ENCOUNTER — OFFICE VISIT (OUTPATIENT)
Dept: CARDIOLOGY | Facility: CLINIC | Age: 80
End: 2020-10-01

## 2020-10-01 VITALS
HEART RATE: 51 BPM | OXYGEN SATURATION: 100 % | BODY MASS INDEX: 30.49 KG/M2 | HEIGHT: 65 IN | DIASTOLIC BLOOD PRESSURE: 79 MMHG | SYSTOLIC BLOOD PRESSURE: 172 MMHG | WEIGHT: 183 LBS

## 2020-10-01 DIAGNOSIS — I48.19 ATRIAL FIBRILLATION, PERSISTENT (HCC): Primary | ICD-10-CM

## 2020-10-01 DIAGNOSIS — E03.9 HYPOTHYROIDISM (ACQUIRED): ICD-10-CM

## 2020-10-01 DIAGNOSIS — I49.5 TACHYCARDIA-BRADYCARDIA (HCC): ICD-10-CM

## 2020-10-01 DIAGNOSIS — E78.5 DYSLIPIDEMIA: ICD-10-CM

## 2020-10-01 DIAGNOSIS — I10 ESSENTIAL HYPERTENSION: ICD-10-CM

## 2020-10-01 DIAGNOSIS — Z79.01 CHRONIC ANTICOAGULATION: ICD-10-CM

## 2020-10-01 PROCEDURE — 93000 ELECTROCARDIOGRAM COMPLETE: CPT | Performed by: INTERNAL MEDICINE

## 2020-10-01 PROCEDURE — 99214 OFFICE O/P EST MOD 30 MIN: CPT | Performed by: INTERNAL MEDICINE

## 2020-10-01 NOTE — PROGRESS NOTES
Encounter Date:10/01/2020      Patient ID: Kingsley Hunt is a 80 y.o. female.    Chief Complaint:  Tachy-bradycardia syndrome  Hypertension  Dyslipidemia  Hypothyroidism  History of atrial fibrillation  Status post cardioversion 9/11/2020     History of Present Illness  Patient has occasional palpitations otherwise feeling great.  Patient had 24-hour Holter monitor which did not show any significant dysrhythmia.  Patient was maintaining sinus rhythm.    Since I have last seen, the patient has been without any chest discomfort ,shortness of breath, dizziness or syncope.  Denies having any headache ,abdominal pain ,nausea, vomiting , diarrhea constipation, loss of weight or loss of appetite.  Denies having any excessive bruising ,hematuria or blood in the stool.    Review of all systems negative except as indicated  Assessment and Plan         ]]]]]]]]]]]]]]]]]]]]]]    Impression  ===========  - History of new onset and persistent atrial fibrillation with moderate ventricular response.  Status post cardioversion 9/11/2020  Patient has converted and maintaining sinus rhythm.      -History of tachy-clay syndrome.  Patient has history of SVT and she had sinus bradycardia heart rates in the 44 per minute in the past.     -hypothyroidism hypertension dyslipidemia .  Borderline diabetes     -status post appendectomy     Family history is positive for coronary artery disease     -allergic to morphine.  Patient apparently does not have any problems with allergy to morphine anymore     -former smoker.  Quit 30 years ago  ===============  Plan  ==========  EKG showed sinus bradycardia with sinus arrhythmia.  Status post cardioversion for persistent atrial fibrillation 9/11/2020.  Patient has improved significantly and is feeling great.  Dr. Gladys Galo reduce the dose of acebutolol to 200 mg once a day and continue losartan.  Medications were reviewed and updated.  Discontinue Eliquis since patient is maintaining sinus  rhythm.  I have discussed with her regarding possibility of needing permanent pacemaker in the future if she has problems with tachy-clay episodes in the future.  Follow-up in the office in 3 months with EKG..  Further plan will depend on patient's progress.  ]]]]]]]]]]]]]]]]]       Diagnosis Plan   1. Atrial fibrillation, persistent (CMS/HCC)  ECG 12 Lead   2. Tachycardia-bradycardia (CMS/HCC)  ECG 12 Lead   3. Dyslipidemia  ECG 12 Lead   4. Essential hypertension  ECG 12 Lead   5. Chronic anticoagulation  ECG 12 Lead   6. Hypothyroidism (acquired)  ECG 12 Lead   LAB RESULTS (LAST 7 DAYS)    CBC        BMP        CMP         BNP        TROPONIN        CoAg        Creatinine Clearance  CrCl cannot be calculated (Patient's most recent lab result is older than the maximum 30 days allowed.).    ABG        Radiology  No radiology results for the last day                The following portions of the patient's history were reviewed and updated as appropriate: allergies, current medications, past family history, past medical history, past social history, past surgical history and problem list.    Review of Systems   Constitution: Negative for malaise/fatigue.   Cardiovascular: Positive for irregular heartbeat (RACING). Negative for chest pain, leg swelling, palpitations and syncope.   Respiratory: Negative for shortness of breath.    Skin: Negative for rash.   Gastrointestinal: Negative for nausea and vomiting.   Neurological: Negative for dizziness, light-headedness and numbness.         Current Outpatient Medications:   •  acebutolol (SECTRAL) 200 MG capsule, Take 200 mg by mouth Daily., Disp: , Rfl:   •  apixaban (Eliquis) 5 MG tablet tablet, Take 1 tablet by mouth Every 12 (Twelve) Hours., Disp: 56 tablet, Rfl: 0  •  Ascorbic Acid (VITAMIN C) 500 MG capsule, Take 500 mg by mouth Daily., Disp: , Rfl:   •  aspirin 81 MG EC tablet, Take 81 mg by mouth Daily., Disp: , Rfl:   •  Cholecalciferol (VITAMIN D3) 125 MCG (5000  UT) capsule capsule, Take 5,000 Units by mouth Daily., Disp: , Rfl:   •  docusate sodium (COLACE) 100 MG capsule, Take 300 mg by mouth Daily., Disp: , Rfl:   •  hydroCHLOROthiazide (HYDRODIURIL) 12.5 MG tablet, 12.5 mg Daily., Disp: , Rfl:   •  levothyroxine (SYNTHROID, LEVOTHROID) 50 MCG tablet, Take 50 mcg by mouth Daily., Disp: , Rfl:   •  losartan (COZAAR) 100 MG tablet, Take 100 mg by mouth Daily., Disp: , Rfl:   •  melatonin 5 MG tablet tablet, Take 5 mg by mouth Every Night., Disp: , Rfl:   •  polyethylene glycol (MIRALAX) packet, Take 17 g by mouth Daily As Needed., Disp: , Rfl:   •  ProAir  (90 Base) MCG/ACT inhaler, INHALE TWO (2) PUFFS BY MOUTH EVERY FOUR (4) TO SIX (6) HOURS AS NEEDED, Disp: , Rfl:   •  vitamin B-12 (CYANOCOBALAMIN) 100 MCG tablet, Take 50 mcg by mouth Daily., Disp: , Rfl:   •  vitamin B-6 (PYRIDOXINE) 100 MG tablet, Take 100 mg by mouth Daily., Disp: , Rfl:   •  Zinc 50 MG tablet, Take 50 mg by mouth Daily., Disp: , Rfl:     No Known Allergies    Family History   Problem Relation Age of Onset   • Cancer Mother    • Diabetes Sister    • Heart disease Sister    • Diabetes Son        Past Surgical History:   Procedure Laterality Date   • APPENDECTOMY     • COLONOSCOPY     • ENDOSCOPY     • EYE SURGERY     • SKIN BIOPSY         Past Medical History:   Diagnosis Date   • Arthritis    • Cancer (CMS/HCC)     Skin Cancer   • Disease of thyroid gland    • Elevated cholesterol    • GERD (gastroesophageal reflux disease)    • Hypertension        Family History   Problem Relation Age of Onset   • Cancer Mother    • Diabetes Sister    • Heart disease Sister    • Diabetes Son        Social History     Socioeconomic History   • Marital status:      Spouse name: Not on file   • Number of children: Not on file   • Years of education: Not on file   • Highest education level: Not on file   Tobacco Use   • Smoking status: Former Smoker   • Smokeless tobacco: Never Used   • Tobacco comment:  "Quit 30+ years ago   Substance and Sexual Activity   • Alcohol use: Yes     Comment: 4 glasses in the last month    • Drug use: No   • Sexual activity: Defer           ECG 12 Lead    Date/Time: 10/1/2020 1:37 PM  Performed by: Kieran Hines MD  Authorized by: Kieran Hines MD   Comparison: compared with previous ECG   Similar to previous ECG  Comparison to previous ECG: Sinus bradycardia sinus arrhythmia 46/min nonspecific ST-T wave changes normal axis normal intervals no significant change from 9/15/2020.                Objective:       Physical Exam    /79   Pulse 51   Ht 165.1 cm (65\")   Wt 83 kg (183 lb)   SpO2 100%   BMI 30.45 kg/m²   The patient is alert, oriented and in no distress.    Vital signs as noted above.    Head and neck revealed no carotid bruits or jugular venous distension.  No thyromegaly or lymphadenopathy is present.    Lungs clear.  No wheezing.  Breath sounds are normal bilaterally.    Heart normal first and second heart sounds.  No murmur..  No pericardial rub is present.  No gallop is present.    Abdomen soft and nontender.  No organomegaly is present.    Extremities revealed good peripheral pulses without any pedal edema.    Skin warm and dry.    Musculoskeletal system is grossly normal.    CNS grossly normal.    Reviewed and unchanged from last visit.        "

## 2021-01-12 ENCOUNTER — OFFICE VISIT (OUTPATIENT)
Dept: CARDIOLOGY | Facility: CLINIC | Age: 81
End: 2021-01-12

## 2021-01-12 VITALS
WEIGHT: 183 LBS | HEART RATE: 49 BPM | SYSTOLIC BLOOD PRESSURE: 147 MMHG | HEIGHT: 65 IN | DIASTOLIC BLOOD PRESSURE: 60 MMHG | BODY MASS INDEX: 30.49 KG/M2

## 2021-01-12 DIAGNOSIS — E78.5 DYSLIPIDEMIA: ICD-10-CM

## 2021-01-12 DIAGNOSIS — E03.9 HYPOTHYROIDISM (ACQUIRED): ICD-10-CM

## 2021-01-12 DIAGNOSIS — I10 ESSENTIAL HYPERTENSION: ICD-10-CM

## 2021-01-12 DIAGNOSIS — I49.5 TACHYCARDIA-BRADYCARDIA (HCC): Primary | ICD-10-CM

## 2021-01-12 PROBLEM — R00.2 PALPITATIONS: Status: ACTIVE | Noted: 2019-04-23

## 2021-01-12 PROCEDURE — 99213 OFFICE O/P EST LOW 20 MIN: CPT | Performed by: INTERNAL MEDICINE

## 2021-01-12 RX ORDER — OMEPRAZOLE 20 MG/1
20 CAPSULE, DELAYED RELEASE ORAL 2 TIMES DAILY
COMMUNITY
End: 2022-05-05

## 2021-01-12 NOTE — PROGRESS NOTES
You have chosen to receive care through a telephone visit. Do you consent to use a telephone visit for your medical care today? Yes  This visit has been rescheduled as a phone visit to comply with patient safety concerns in accordance with CDC recommendations. Total time of discussion was 12 minutes.    Encounter Date:01/12/2021      Patient ID: Kingsley Hunt is a 80 y.o. female.    Chief Complaint:  Tachy-bradycardia syndrome  Hypertension  Dyslipidemia  Hypothyroidism  History of atrial fibrillation  Status post cardioversion 9/11/2020     History of Present Illness  Occasional palpitation lasting for a few seconds.     Since I have last seen, the patient has been without any chest discomfort ,shortness of breath, dizziness or syncope.  Denies having any headache ,abdominal pain ,nausea, vomiting , diarrhea constipation, loss of weight or loss of appetite.  Denies having any excessive bruising ,hematuria or blood in the stool.     Review of all systems negative except as indicated  Assessment and Plan         ]]]]]]]]]]]]]]]]]]]]]]    Impression  ===========  - History of new onset and persistent atrial fibrillation with moderate ventricular response.  Status post cardioversion 9/11/2020  Patient has converted and maintaining sinus rhythm.      -History of tachy-clay syndrome.  Patient has history of SVT and she had sinus bradycardia heart rates in the 44 per minute in the past.     -hypothyroidism hypertension dyslipidemia .  Borderline diabetes     -status post appendectomy     Family history is positive for coronary artery disease     -allergic to morphine.  Patient apparently does not have any problems with allergy to morphine anymore     -former smoker.  Quit 30 years ago  ===============  Plan  ==========  Telephone visit  History of atrial fibrillation.  Patient has occasional palpitations lasting for a few seconds.  Status post cardioversion for persistent atrial fibrillation 9/11/2020.  Patient has  improved significantly and is feeling great.  Continue acebutolol to 200 mg once a day and continue losartan.  Medications were reviewed and updated.  Patient is off Eliquis  I have discussed with her regarding possibility of needing permanent pacemaker in the future if she has problems with tachy-clay episodes in the future.  Follow-up in the office in 3 months with EKG..  Further plan will depend on patient's progress.  ]]]]]]]]]]]]]]]]]                  Diagnosis Plan   1. Tachycardia-bradycardia (CMS/HCC)     2. Dyslipidemia     3. Essential hypertension     4. Hypothyroidism (acquired)     LAB RESULTS (LAST 7 DAYS)    CBC        BMP        CMP         BNP        TROPONIN        CoAg        Creatinine Clearance  CrCl cannot be calculated (Patient's most recent lab result is older than the maximum 30 days allowed.).    ABG        Radiology  No radiology results for the last day                The following portions of the patient's history were reviewed and updated as appropriate: allergies, current medications, past family history, past medical history, past social history, past surgical history and problem list.    Review of Systems   Constitution: Negative for malaise/fatigue.   Cardiovascular: Positive for palpitations. Negative for chest pain, leg swelling and syncope.   Respiratory: Negative for shortness of breath.    Skin: Negative for rash.   Gastrointestinal: Negative for nausea and vomiting.   Neurological: Negative for dizziness, light-headedness and numbness.         Current Outpatient Medications:   •  acebutolol (SECTRAL) 200 MG capsule, Take 200 mg by mouth Daily., Disp: , Rfl:   •  Ascorbic Acid (VITAMIN C) 500 MG capsule, Take 500 mg by mouth Daily. 4 caps / day, Disp: , Rfl:   •  aspirin 81 MG EC tablet, Take 81 mg by mouth Daily., Disp: , Rfl:   •  Cholecalciferol (VITAMIN D3) 125 MCG (5000 UT) capsule capsule, Take 5,000 Units by mouth Daily., Disp: , Rfl:   •  hydroCHLOROthiazide  (HYDRODIURIL) 12.5 MG tablet, 12.5 mg Daily., Disp: , Rfl:   •  levothyroxine (SYNTHROID, LEVOTHROID) 50 MCG tablet, Take 50 mcg by mouth Daily., Disp: , Rfl:   •  losartan (COZAAR) 100 MG tablet, Take 100 mg by mouth Daily., Disp: , Rfl:   •  omeprazole (priLOSEC) 20 MG capsule, Take 20 mg by mouth 2 (two) times a day., Disp: , Rfl:   •  polyethylene glycol (MIRALAX) packet, Take 17 g by mouth Daily As Needed., Disp: , Rfl:   •  ProAir  (90 Base) MCG/ACT inhaler, INHALE TWO (2) PUFFS BY MOUTH EVERY FOUR (4) TO SIX (6) HOURS AS NEEDED, Disp: , Rfl:   •  vitamin B-12 (CYANOCOBALAMIN) 100 MCG tablet, Take 50 mcg by mouth Daily., Disp: , Rfl:   •  vitamin B-6 (PYRIDOXINE) 100 MG tablet, Take 100 mg by mouth Daily., Disp: , Rfl:   •  Zinc 50 MG tablet, Take 50 mg by mouth Daily., Disp: , Rfl:     No Known Allergies    Family History   Problem Relation Age of Onset   • Cancer Mother    • Diabetes Sister    • Heart disease Sister    • Diabetes Son        Past Surgical History:   Procedure Laterality Date   • APPENDECTOMY     • COLONOSCOPY     • ENDOSCOPY     • EYE SURGERY     • SKIN BIOPSY         Past Medical History:   Diagnosis Date   • Arthritis    • Cancer (CMS/HCC)     Skin Cancer   • Disease of thyroid gland    • Elevated cholesterol    • GERD (gastroesophageal reflux disease)    • Hypertension        Family History   Problem Relation Age of Onset   • Cancer Mother    • Diabetes Sister    • Heart disease Sister    • Diabetes Son        Social History     Socioeconomic History   • Marital status:      Spouse name: Not on file   • Number of children: Not on file   • Years of education: Not on file   • Highest education level: Not on file   Tobacco Use   • Smoking status: Former Smoker   • Smokeless tobacco: Never Used   • Tobacco comment: Quit 30+ years ago   Substance and Sexual Activity   • Alcohol use: Yes     Comment: 4 glasses in the last month    • Drug use: No   • Sexual activity: Defer  "        Procedures      Objective:       Physical Exam    /60   Pulse (!) 49   Ht 165.1 cm (65\")   Wt 83 kg (183 lb)   BMI 30.45 kg/m²   The patient is alert, oriented and in no distress.    Vital signs as noted above.    No audible shortness of breath    CNS grossly normal.        "

## 2021-04-20 ENCOUNTER — OFFICE VISIT (OUTPATIENT)
Dept: CARDIOLOGY | Facility: CLINIC | Age: 81
End: 2021-04-20

## 2021-04-20 VITALS
TEMPERATURE: 97.3 F | HEART RATE: 74 BPM | BODY MASS INDEX: 32.07 KG/M2 | WEIGHT: 192.5 LBS | HEIGHT: 65 IN | DIASTOLIC BLOOD PRESSURE: 85 MMHG | SYSTOLIC BLOOD PRESSURE: 129 MMHG | OXYGEN SATURATION: 96 %

## 2021-04-20 DIAGNOSIS — Z79.01 CHRONIC ANTICOAGULATION: ICD-10-CM

## 2021-04-20 DIAGNOSIS — I10 ESSENTIAL HYPERTENSION: ICD-10-CM

## 2021-04-20 DIAGNOSIS — I49.5 TACHYCARDIA-BRADYCARDIA (HCC): Primary | ICD-10-CM

## 2021-04-20 DIAGNOSIS — I48.19 ATRIAL FIBRILLATION, PERSISTENT (HCC): ICD-10-CM

## 2021-04-20 PROCEDURE — 93000 ELECTROCARDIOGRAM COMPLETE: CPT | Performed by: INTERNAL MEDICINE

## 2021-04-20 PROCEDURE — 99214 OFFICE O/P EST MOD 30 MIN: CPT | Performed by: INTERNAL MEDICINE

## 2021-04-20 NOTE — PROGRESS NOTES
Encounter Date:04/20/2021  Last seen 1/12/2021      Patient ID: Kingsley Hunt is a 80 y.o. female.    Chief Complaint:    Tachy-bradycardia syndrome  Hypertension  Dyslipidemia  Hypothyroidism  History of atrial fibrillation  Status post cardioversion 9/11/2020     History of Present Illness  Since I have last seen, the patient has been without any chest discomfort ,shortness of breath, palpitations, dizziness or syncope.  Denies having any headache ,abdominal pain ,nausea, vomiting , diarrhea constipation, loss of weight or loss of appetite.  Denies having any excessive bruising ,hematuria or blood in the stool.    Review of all systems negative except as indicated.    Reviewed ROS.    Assessment and Plan         ]]]]]]]]]]]]]]]]]]]]]]    Impression  ===========  - History of new onset and persistent atrial fibrillation with moderate ventricular response.  Status post cardioversion 9/11/2020  Patient has converted and maintaining sinus rhythm until today.  EKG today 4/20/2021 revealed atrial fibrillation with controlled ventricular response      -History of tachy-clay syndrome.  Patient has history of SVT and she had sinus bradycardia heart rates in the 44 per minute in the past.     -hypothyroidism hypertension dyslipidemia .  Borderline diabetes     -status post appendectomy     Family history is positive for coronary artery disease     -allergic to morphine.  Patient apparently does not have any problems with allergy to morphine anymore     -former smoker.  Quit 30 years ago  ===============  Plan  ==========  History of atrial fibrillation.  Status post cardioversion for persistent atrial fibrillation 9/11/2020.  Patient has improved significantly and is feeling great.  Continue acebutolol to 200 mg once a day and continue losartan.  However patient today is in atrial fibrillation.  Anticoagulation status was reviewed.  Patient has been off Eliquis.  Patient was restarted on Eliquis 5 mg twice a  day.  Patient is not interested in going on any Coumadin at this time.  Medications were reviewed and updated.  Risks and benefits pros and cons of anticoagulation was discussed with patient.  Follow-up in the office in 6 weeks with EKG.  Further plan will depend on patient's progress.  ]]]]]]]]]]]]]]]]]                  No diagnosis found.LAB RESULTS (LAST 7 DAYS)    CBC        BMP        CMP         BNP        TROPONIN        CoAg        Creatinine Clearance  CrCl cannot be calculated (Patient's most recent lab result is older than the maximum 30 days allowed.).    ABG        Radiology  No radiology results for the last day                The following portions of the patient's history were reviewed and updated as appropriate: allergies, current medications, past family history, past medical history, past social history, past surgical history and problem list.    Review of Systems   Constitutional: Negative for malaise/fatigue.   Cardiovascular: Negative for chest pain, leg swelling, palpitations and syncope.   Respiratory: Negative for shortness of breath.    Skin: Negative for rash.   Gastrointestinal: Negative for nausea and vomiting.   Neurological: Negative for dizziness, light-headedness and numbness.         Current Outpatient Medications:   •  acebutolol (SECTRAL) 200 MG capsule, Take 200 mg by mouth Daily., Disp: , Rfl:   •  Ascorbic Acid (VITAMIN C) 500 MG capsule, Take 500 mg by mouth Daily. 4 caps / day, Disp: , Rfl:   •  aspirin 81 MG EC tablet, Take 81 mg by mouth Daily., Disp: , Rfl:   •  Cholecalciferol (VITAMIN D3) 125 MCG (5000 UT) capsule capsule, Take 5,000 Units by mouth Daily., Disp: , Rfl:   •  hydroCHLOROthiazide (HYDRODIURIL) 12.5 MG tablet, 12.5 mg Daily., Disp: , Rfl:   •  levothyroxine (SYNTHROID, LEVOTHROID) 50 MCG tablet, Take 50 mcg by mouth Daily., Disp: , Rfl:   •  losartan (COZAAR) 100 MG tablet, Take 100 mg by mouth Daily., Disp: , Rfl:   •  omeprazole (priLOSEC) 20 MG capsule,  Take 20 mg by mouth 2 (two) times a day., Disp: , Rfl:   •  polyethylene glycol (MIRALAX) packet, Take 17 g by mouth Daily As Needed., Disp: , Rfl:   •  ProAir  (90 Base) MCG/ACT inhaler, INHALE TWO (2) PUFFS BY MOUTH EVERY FOUR (4) TO SIX (6) HOURS AS NEEDED, Disp: , Rfl:   •  vitamin B-12 (CYANOCOBALAMIN) 100 MCG tablet, Take 50 mcg by mouth Daily., Disp: , Rfl:   •  vitamin B-6 (PYRIDOXINE) 100 MG tablet, Take 100 mg by mouth Daily., Disp: , Rfl:   •  Zinc 50 MG tablet, Take 50 mg by mouth Daily., Disp: , Rfl:     No Known Allergies    Family History   Problem Relation Age of Onset   • Cancer Mother    • Diabetes Sister    • Heart disease Sister    • Diabetes Son        Past Surgical History:   Procedure Laterality Date   • APPENDECTOMY     • COLONOSCOPY     • ENDOSCOPY     • EYE SURGERY     • SKIN BIOPSY         Past Medical History:   Diagnosis Date   • Arthritis    • Cancer (CMS/HCC)     Skin Cancer   • Disease of thyroid gland    • Elevated cholesterol    • GERD (gastroesophageal reflux disease)    • Hypertension        Family History   Problem Relation Age of Onset   • Cancer Mother    • Diabetes Sister    • Heart disease Sister    • Diabetes Son        Social History     Socioeconomic History   • Marital status:      Spouse name: Not on file   • Number of children: Not on file   • Years of education: Not on file   • Highest education level: Not on file   Tobacco Use   • Smoking status: Former Smoker   • Smokeless tobacco: Never Used   • Tobacco comment: Quit 30+ years ago   Vaping Use   • Vaping Use: Never used   Substance and Sexual Activity   • Alcohol use: Yes     Comment: 4 glasses in the last month    • Drug use: No   • Sexual activity: Defer           ECG 12 Lead    Date/Time: 4/20/2021 1:20 PM  Performed by: Kieran Hines MD  Authorized by: Kieran Hines MD   Comparison: compared with previous ECG   Comparison to previous ECG: Atrial fibrillation with controlled ventricular  "response 72/min nonspecific ST-T wave changes normal axis normal intervals no ectopy change from 10/1/2020.  Patient was in sinus rhythm                Objective:       Physical Exam    /85   Pulse 74   Temp 97.3 °F (36.3 °C)   Ht 165.1 cm (65\")   Wt 87.3 kg (192 lb 8 oz)   SpO2 96%   BMI 32.03 kg/m²   The patient is alert, oriented and in no distress.    Vital signs as noted above.    Head and neck revealed no carotid bruits or jugular venous distension.  No thyromegaly or lymphadenopathy is present.    Lungs clear.  No wheezing.  Breath sounds are normal bilaterally.    Heart normal first and second heart sounds.  No murmur..  No pericardial rub is present.  No gallop is present.    Abdomen soft and nontender.  No organomegaly is present.    Extremities revealed good peripheral pulses without any pedal edema.    Skin warm and dry.    Musculoskeletal system is grossly normal.    CNS grossly normal.        "

## 2021-08-18 ENCOUNTER — TELEPHONE (OUTPATIENT)
Dept: CARDIOLOGY | Facility: CLINIC | Age: 81
End: 2021-08-18

## 2021-08-26 ENCOUNTER — TELEPHONE (OUTPATIENT)
Dept: CARDIOLOGY | Facility: CLINIC | Age: 81
End: 2021-08-26

## 2021-08-27 RX ORDER — DILTIAZEM HYDROCHLORIDE 240 MG/1
240 CAPSULE, COATED, EXTENDED RELEASE ORAL DAILY
Qty: 30 CAPSULE | Refills: 3 | Status: SHIPPED | OUTPATIENT
Start: 2021-08-27 | End: 2022-05-05

## 2021-08-27 NOTE — TELEPHONE ENCOUNTER
Patient is scheduled for Monday 9/13 @ 12:10 PM. RX sent to pharmacy listed on file. Patient has been notified and she voiced her understanding.

## 2021-08-27 NOTE — TELEPHONE ENCOUNTER
Patient states that for 2 nights this week she has not been able to sleep because of her heart being out of rhythm. Patient is also having some swelling.     Patient is on Eliquis.     Patient requesting a medication that can help it get back in to rhythm.      It looks like at her last appt on 4/20/2021 you stated for her to follow-up in 6 weeks, but it looks like her appt was actually scheduled for 6 months. Should we go ahead and bring the patient in now with an EKG?

## 2021-09-10 ENCOUNTER — TELEPHONE (OUTPATIENT)
Dept: FAMILY MEDICINE CLINIC | Facility: CLINIC | Age: 81
End: 2021-09-10

## 2021-09-10 ENCOUNTER — OFFICE (AMBULATORY)
Dept: URBAN - METROPOLITAN AREA CLINIC 64 | Facility: CLINIC | Age: 81
End: 2021-09-10

## 2021-09-10 VITALS
HEART RATE: 57 BPM | WEIGHT: 190 LBS | HEIGHT: 66 IN | SYSTOLIC BLOOD PRESSURE: 128 MMHG | DIASTOLIC BLOOD PRESSURE: 73 MMHG

## 2021-09-10 DIAGNOSIS — K59.00 CONSTIPATION, UNSPECIFIED: ICD-10-CM

## 2021-09-10 DIAGNOSIS — K21.9 GASTRO-ESOPHAGEAL REFLUX DISEASE WITHOUT ESOPHAGITIS: ICD-10-CM

## 2021-09-10 DIAGNOSIS — R73.03 PREDIABETES: ICD-10-CM

## 2021-09-10 PROCEDURE — 99213 OFFICE O/P EST LOW 20 MIN: CPT | Performed by: INTERNAL MEDICINE

## 2021-09-10 NOTE — TELEPHONE ENCOUNTER
Caller: Kingsley Hunt    Relationship: Self    Best call back number: [phone]    Who is your current provider: N/A    Who would you like your new provider to be: DR BIRD    What are your reasons for transferring care: PCP NO LONGER PRACTICING    Additional notes: STATED DR BURNS SPOKE WITH DR BIRD TO APPROVE

## 2021-09-10 NOTE — SERVICEHPINOTES
This is an 81 y/o female who presents for f/u.She has a h/o GERD. Pepcid was added qpm last visit to omeprazole 20mg BID.  She states she stopped taking omeprazole because she wanted to stop taking some medications.  She stopped it for several week.  However, she had to resume PPI due to HB and restarted it 2 days ago.  For constipation, Trulance was added.  She had diarrhea with Miralax. Metamucil causes constipation. she reports daily BM on Trulance.  No diarrhea.  She is happy with this medication. She has question about how to eat.  She had tried Keto diet and has been reading about vegetarian diet.   She has been told she has borderline DM.  She has a h/o diverticulitis. Last course of Cipro/Flagyl 4/21. Previously 5/20, 1/20, and 2 others since 6/18. Responded well to ABx. Thinks popcorn trigger. CT a/p 6/18 - diverticulitis of sigmoid colon. Colonoscopy 9/18 - melanosis coli, diverticulosis sig/transverse colon, TA polyp. NO recall. BR5/18 EGD (Dr. Whatley) - duodenitis, gastritis, LA grade B esophagitis in GE jx, h/h, bx negative for h pyloriBR5/17 EGD with dil (Dr Whatley) Erythema in the antrum and stomach body compatible with gastritis. (Biopsy-reactive gastropathy. HP negative). Esophageal food. LA Grade A esophagitis in the gastroesophageal junction. Hiatal Hernia in the cardia.BR6/14 EGD/Colonoscopy (Dr Whatley) nonerosive antral gastritis, 3 cm HH. Diverticulitis, Transverse colon polyp-focal HP changes, very minimal adenomatous change involving 3 crypts. Internal &amp external hemorrhoids H/o AFib s/p ablation, but AFib has recurred.  She c/o [facility]. She has been f/b Dr. Hardin who recently added new medication and has close f/u with him next week.

## 2021-09-13 ENCOUNTER — OFFICE VISIT (OUTPATIENT)
Dept: CARDIOLOGY | Facility: CLINIC | Age: 81
End: 2021-09-13

## 2021-09-13 VITALS
HEART RATE: 62 BPM | OXYGEN SATURATION: 98 % | DIASTOLIC BLOOD PRESSURE: 84 MMHG | BODY MASS INDEX: 31.32 KG/M2 | WEIGHT: 188 LBS | HEIGHT: 65 IN | SYSTOLIC BLOOD PRESSURE: 129 MMHG

## 2021-09-13 DIAGNOSIS — Z79.01 CHRONIC ANTICOAGULATION: ICD-10-CM

## 2021-09-13 DIAGNOSIS — E78.5 DYSLIPIDEMIA: ICD-10-CM

## 2021-09-13 DIAGNOSIS — E87.8 ELECTROLYTE IMBALANCE: ICD-10-CM

## 2021-09-13 DIAGNOSIS — I10 ESSENTIAL HYPERTENSION: ICD-10-CM

## 2021-09-13 DIAGNOSIS — E03.9 HYPOTHYROIDISM (ACQUIRED): ICD-10-CM

## 2021-09-13 DIAGNOSIS — I49.5 TACHYCARDIA-BRADYCARDIA (HCC): Primary | ICD-10-CM

## 2021-09-13 DIAGNOSIS — I48.19 ATRIAL FIBRILLATION, PERSISTENT (HCC): ICD-10-CM

## 2021-09-13 PROCEDURE — 93000 ELECTROCARDIOGRAM COMPLETE: CPT | Performed by: INTERNAL MEDICINE

## 2021-09-13 PROCEDURE — 99214 OFFICE O/P EST MOD 30 MIN: CPT | Performed by: INTERNAL MEDICINE

## 2021-09-13 NOTE — PROGRESS NOTES
Encounter Date:09/13/2021    Last seen 4/20/2021      Patient ID: Kingsley Hunt is a 80 y.o. female.    Chief Complaint:    Tachy-bradycardia syndrome  Hypertension  Dyslipidemia  Hypothyroidism  History of atrial fibrillation  Status post cardioversion 9/11/2020     History of Present Illness  Since I have last seen, the patient has been without any chest discomfort ,shortness of breath, palpitations, dizziness or syncope.  Denies having any headache ,abdominal pain ,nausea, vomiting , diarrhea constipation, loss of weight or loss of appetite.  Denies having any excessive bruising ,hematuria or blood in the stool.    Review of all systems negative except as indicated.    Reviewed ROS.  Assessment and Plan         ]]]]]]]]]]]]]]]]]]]]]]    Impression  ===========  -Persistent atrial fibrillation with moderate ventricular response.  Status post cardioversion 9/11/2020  Patient has converted and was maintaining  Went back into atrial fibrillation.  EKG today 4/20/2021 revealed atrial fibrillation with controlled ventricular response      -History of tachy-clay syndrome.  Patient has history of SVT and she had sinus bradycardia heart rates in the 44 per minute in the past.     -hypothyroidism hypertension dyslipidemia .  Borderline diabetes     -status post appendectomy     Family history is positive for coronary artery disease     -allergic to morphine.  Patient apparently does not have any problems with allergy to morphine anymore     -former smoker.  Quit 30 years ago  ===============  Plan  ==========  Persistent atrial fibrillation  Status post cardioversion for persistent atrial fibrillation 9/11/2020.  Patient has improved significantly and is feeling great.  Continue acebutolol to 200 mg once a day and continue losartan.  Patient recently was started on Cardizem  mg a day.    However patient today is in atrial fibrillation.  EKG shows atrial fibrillation with controlled ventricular  response.    Anticoagulation status was reviewed.  Patient is  on Eliquis 5 mg twice a day.  Patient is not interested in going on any Coumadin at this time.    Options were discussed including cardioversion.  Patient is interested in pursuing cardioversion since he is having fatigue with atrial fibrillation.  Risks and benefits pros and cons of the procedure were discussed with patient including arrhythmia risk anesthetic risk bradycardia etc.  Consideration would be given for ablation if she has recurrence of atrial fibrillation.    Medications were reviewed and updated.  Further plan will depend on patient's progress.  ]]]]]]]]]]]]]]]]]              Diagnosis Plan   1. Tachycardia-bradycardia (CMS/HCC)  Cardioversion External in Cardiology Department    CBC (No Diff)    Protime-INR    aPTT    ECG 12 Lead    Basic Metabolic Panel   2. Essential hypertension  Cardioversion External in Cardiology Department    CBC (No Diff)    Protime-INR    aPTT    ECG 12 Lead    Basic Metabolic Panel   3. Atrial fibrillation, persistent (CMS/HCC)  Cardioversion External in Cardiology Department    CBC (No Diff)    Protime-INR    aPTT    ECG 12 Lead    Basic Metabolic Panel   4. Chronic anticoagulation  Cardioversion External in Cardiology Department    CBC (No Diff)    Protime-INR    aPTT    ECG 12 Lead    Basic Metabolic Panel   5. Dyslipidemia  Cardioversion External in Cardiology Department    CBC (No Diff)    Protime-INR    aPTT    ECG 12 Lead    Basic Metabolic Panel   6. Hypothyroidism (acquired)  Cardioversion External in Cardiology Department    CBC (No Diff)    Protime-INR    aPTT    ECG 12 Lead    Basic Metabolic Panel   7. Electrolyte imbalance  Cardioversion External in Cardiology Department    CBC (No Diff)    Protime-INR    aPTT    ECG 12 Lead    Basic Metabolic Panel   LAB RESULTS (LAST 7 DAYS)    CBC        BMP        CMP         BNP        TROPONIN        CoAg        Creatinine Clearance  CrCl cannot be calculated  (Patient's most recent lab result is older than the maximum 30 days allowed.).    ABG        Radiology  No radiology results for the last day                The following portions of the patient's history were reviewed and updated as appropriate: allergies, current medications, past family history, past medical history, past social history, past surgical history and problem list.    Review of Systems   Constitutional: Positive for malaise/fatigue.   Cardiovascular: Positive for palpitations. Negative for chest pain, leg swelling and syncope.   Respiratory: Positive for shortness of breath.    Skin: Negative for rash.   Gastrointestinal: Negative for nausea and vomiting.   Neurological: Negative for dizziness, light-headedness and numbness.   All other systems reviewed and are negative.        Current Outpatient Medications:   •  acebutolol (SECTRAL) 200 MG capsule, Take 200 mg by mouth Daily., Disp: , Rfl:   •  apixaban (ELIQUIS) 5 MG tablet tablet, Take 5 mg by mouth 2 (Two) Times a Day., Disp: , Rfl:   •  Ascorbic Acid (VITAMIN C) 500 MG capsule, Take 500 mg by mouth Daily. 4 caps / day, Disp: , Rfl:   •  aspirin 81 MG EC tablet, Take 81 mg by mouth Daily., Disp: , Rfl:   •  Cholecalciferol (VITAMIN D3) 125 MCG (5000 UT) capsule capsule, Take 5,000 Units by mouth Daily., Disp: , Rfl:   •  dilTIAZem CD (CARDIZEM CD) 240 MG 24 hr capsule, Take 1 capsule by mouth Daily., Disp: 30 capsule, Rfl: 3  •  hydroCHLOROthiazide (HYDRODIURIL) 12.5 MG tablet, 12.5 mg Daily., Disp: , Rfl:   •  levothyroxine (SYNTHROID, LEVOTHROID) 50 MCG tablet, Take 50 mcg by mouth Daily., Disp: , Rfl:   •  losartan (COZAAR) 100 MG tablet, Take 100 mg by mouth Daily., Disp: , Rfl:   •  omeprazole (priLOSEC) 20 MG capsule, Take 20 mg by mouth 2 (two) times a day., Disp: , Rfl:   •  polyethylene glycol (MIRALAX) packet, Take 17 g by mouth Daily As Needed., Disp: , Rfl:   •  ProAir  (90 Base) MCG/ACT inhaler, INHALE TWO (2) PUFFS BY MOUTH  EVERY FOUR (4) TO SIX (6) HOURS AS NEEDED, Disp: , Rfl:   •  vitamin B-12 (CYANOCOBALAMIN) 100 MCG tablet, Take 50 mcg by mouth Daily., Disp: , Rfl:   •  vitamin B-6 (PYRIDOXINE) 100 MG tablet, Take 100 mg by mouth Daily., Disp: , Rfl:   •  Zinc 50 MG tablet, Take 50 mg by mouth Daily., Disp: , Rfl:     No Known Allergies    Family History   Problem Relation Age of Onset   • Cancer Mother    • Diabetes Sister    • Heart disease Sister    • Diabetes Son        Past Surgical History:   Procedure Laterality Date   • APPENDECTOMY     • COLONOSCOPY     • ENDOSCOPY     • EYE SURGERY     • SKIN BIOPSY         Past Medical History:   Diagnosis Date   • Arthritis    • Cancer (CMS/HCC)     Skin Cancer   • Disease of thyroid gland    • Elevated cholesterol    • GERD (gastroesophageal reflux disease)    • Hypertension        Family History   Problem Relation Age of Onset   • Cancer Mother    • Diabetes Sister    • Heart disease Sister    • Diabetes Son        Social History     Socioeconomic History   • Marital status:      Spouse name: Not on file   • Number of children: Not on file   • Years of education: Not on file   • Highest education level: Not on file   Tobacco Use   • Smoking status: Former Smoker   • Smokeless tobacco: Never Used   • Tobacco comment: Quit 30+ years ago   Vaping Use   • Vaping Use: Never used   Substance and Sexual Activity   • Alcohol use: Yes     Comment: 4 glasses in the last month    • Drug use: No   • Sexual activity: Defer           ECG 12 Lead    Date/Time: 9/13/2021 12:44 PM  Performed by: Kieran Hines MD  Authorized by: Kieran Hines MD   Comparison: compared with previous ECG   Similar to previous ECG  Comparison to previous ECG: Atrial fibrillation with controlled ventricular response 57/min nonspecific ST-T wave changes normal axis normal intervals no ectopy no significant change from 4/20/2021                Objective:       Physical Exam    /84 (BP Location: Left  "arm, Patient Position: Sitting, Cuff Size: Large Adult)   Pulse 62   Ht 165.1 cm (65\")   Wt 85.3 kg (188 lb)   SpO2 98%   BMI 31.28 kg/m²   The patient is alert, oriented and in no distress.    Vital signs as noted above.    Head and neck revealed no carotid bruits or jugular venous distension.  No thyromegaly or lymphadenopathy is present.    Lungs clear.  No wheezing.  Breath sounds are normal bilaterally.    Heart normal first and second heart sounds.  No murmur..  No pericardial rub is present.  No gallop is present.    Abdomen soft and nontender.  No organomegaly is present.    Extremities revealed good peripheral pulses without any pedal edema.    Skin warm and dry.    Musculoskeletal system is grossly normal.    CNS grossly normal.    Reviewed and unchanged from last visit.        "

## 2021-09-17 ENCOUNTER — TELEPHONE (OUTPATIENT)
Dept: CARDIOLOGY | Facility: CLINIC | Age: 81
End: 2021-09-17

## 2021-09-17 NOTE — TELEPHONE ENCOUNTER
Ok to proceed with cardioversion.They may be able to do COVID testing on same day (rapid testing)  We can postpone if she is still  uncomfortable.

## 2021-09-20 NOTE — TELEPHONE ENCOUNTER
Spoke with patient   She is ok with doing the rapid COVID test and cardioversion 9/24.   Advised would make sure the COVID testing was the correct one in system.   Patient understood

## 2021-09-24 ENCOUNTER — ANESTHESIA (OUTPATIENT)
Dept: CARDIOLOGY | Facility: HOSPITAL | Age: 81
End: 2021-09-24

## 2021-09-24 ENCOUNTER — ANESTHESIA EVENT (OUTPATIENT)
Dept: CARDIOLOGY | Facility: HOSPITAL | Age: 81
End: 2021-09-24

## 2021-09-24 ENCOUNTER — HOSPITAL ENCOUNTER (OUTPATIENT)
Dept: CARDIOLOGY | Facility: HOSPITAL | Age: 81
Discharge: HOME | End: 2021-09-24

## 2021-09-24 ENCOUNTER — LAB (OUTPATIENT)
Dept: LAB | Facility: HOSPITAL | Age: 81
End: 2021-09-24

## 2021-09-24 VITALS
HEART RATE: 51 BPM | HEIGHT: 66 IN | RESPIRATION RATE: 15 BRPM | SYSTOLIC BLOOD PRESSURE: 108 MMHG | OXYGEN SATURATION: 97 % | WEIGHT: 187.17 LBS | TEMPERATURE: 97.1 F | BODY MASS INDEX: 30.08 KG/M2 | DIASTOLIC BLOOD PRESSURE: 45 MMHG

## 2021-09-24 VITALS — OXYGEN SATURATION: 100 % | SYSTOLIC BLOOD PRESSURE: 97 MMHG | DIASTOLIC BLOOD PRESSURE: 50 MMHG

## 2021-09-24 DIAGNOSIS — E78.5 DYSLIPIDEMIA: ICD-10-CM

## 2021-09-24 DIAGNOSIS — I10 ESSENTIAL HYPERTENSION: ICD-10-CM

## 2021-09-24 DIAGNOSIS — E03.9 HYPOTHYROIDISM (ACQUIRED): ICD-10-CM

## 2021-09-24 DIAGNOSIS — I48.19 ATRIAL FIBRILLATION, PERSISTENT (HCC): ICD-10-CM

## 2021-09-24 DIAGNOSIS — Z01.812 PRE-PROCEDURAL LABORATORY EXAMINATION: ICD-10-CM

## 2021-09-24 DIAGNOSIS — E87.8 ELECTROLYTE IMBALANCE: ICD-10-CM

## 2021-09-24 DIAGNOSIS — I49.5 TACHYCARDIA-BRADYCARDIA (HCC): ICD-10-CM

## 2021-09-24 DIAGNOSIS — Z01.812 PRE-PROCEDURAL LABORATORY EXAMINATION: Primary | ICD-10-CM

## 2021-09-24 DIAGNOSIS — Z79.01 CHRONIC ANTICOAGULATION: ICD-10-CM

## 2021-09-24 LAB
ANION GAP SERPL CALCULATED.3IONS-SCNC: 12 MMOL/L (ref 5–15)
APTT PPP: 29.6 SECONDS (ref 24–31)
BUN SERPL-MCNC: 20 MG/DL (ref 8–23)
BUN/CREAT SERPL: 21.3 (ref 7–25)
CALCIUM SPEC-SCNC: 10.2 MG/DL (ref 8.6–10.5)
CHLORIDE SERPL-SCNC: 101 MMOL/L (ref 98–107)
CO2 SERPL-SCNC: 25 MMOL/L (ref 22–29)
CREAT SERPL-MCNC: 0.94 MG/DL (ref 0.57–1)
DEPRECATED RDW RBC AUTO: 45.5 FL (ref 37–54)
ERYTHROCYTE [DISTWIDTH] IN BLOOD BY AUTOMATED COUNT: 13.5 % (ref 12.3–15.4)
GFR SERPL CREATININE-BSD FRML MDRD: 57 ML/MIN/1.73
GLUCOSE SERPL-MCNC: 151 MG/DL (ref 65–99)
HCT VFR BLD AUTO: 40.9 % (ref 34–46.6)
HGB BLD-MCNC: 14.3 G/DL (ref 12–15.9)
INR PPP: 1.04 (ref 0.93–1.1)
MAXIMAL PREDICTED HEART RATE: 138 BPM
MCH RBC QN AUTO: 34.3 PG (ref 26.6–33)
MCHC RBC AUTO-ENTMCNC: 35 G/DL (ref 31.5–35.7)
MCV RBC AUTO: 97.8 FL (ref 79–97)
PLATELET # BLD AUTO: 270 10*3/MM3 (ref 140–450)
PMV BLD AUTO: 7.9 FL (ref 6–12)
POTASSIUM SERPL-SCNC: 4.9 MMOL/L (ref 3.5–5.2)
PROTHROMBIN TIME: 11.5 SECONDS (ref 9.6–11.7)
RBC # BLD AUTO: 4.18 10*6/MM3 (ref 3.77–5.28)
SARS-COV-2 RNA PNL SPEC NAA+PROBE: NOT DETECTED
SODIUM SERPL-SCNC: 138 MMOL/L (ref 136–145)
STRESS TARGET HR: 117 BPM
WBC # BLD AUTO: 5.1 10*3/MM3 (ref 3.4–10.8)

## 2021-09-24 PROCEDURE — 36415 COLL VENOUS BLD VENIPUNCTURE: CPT

## 2021-09-24 PROCEDURE — 85027 COMPLETE CBC AUTOMATED: CPT

## 2021-09-24 PROCEDURE — 85730 THROMBOPLASTIN TIME PARTIAL: CPT

## 2021-09-24 PROCEDURE — 80048 BASIC METABOLIC PNL TOTAL CA: CPT

## 2021-09-24 PROCEDURE — 87635 SARS-COV-2 COVID-19 AMP PRB: CPT

## 2021-09-24 PROCEDURE — 25010000002 PROPOFOL 10 MG/ML EMULSION: Performed by: ANESTHESIOLOGY

## 2021-09-24 PROCEDURE — 85610 PROTHROMBIN TIME: CPT

## 2021-09-24 PROCEDURE — C9803 HOPD COVID-19 SPEC COLLECT: HCPCS

## 2021-09-24 PROCEDURE — 92960 CARDIOVERSION ELECTRIC EXT: CPT

## 2021-09-24 PROCEDURE — 92960 CARDIOVERSION ELECTRIC EXT: CPT | Performed by: INTERNAL MEDICINE

## 2021-09-24 RX ORDER — SODIUM CHLORIDE 9 MG/ML
30 INJECTION, SOLUTION INTRAVENOUS CONTINUOUS
Status: DISCONTINUED | OUTPATIENT
Start: 2021-09-24 | End: 2021-09-25 | Stop reason: HOSPADM

## 2021-09-24 RX ORDER — PROPOFOL 10 MG/ML
VIAL (ML) INTRAVENOUS AS NEEDED
Status: DISCONTINUED | OUTPATIENT
Start: 2021-09-24 | End: 2021-09-24 | Stop reason: SURG

## 2021-09-24 RX ADMIN — SODIUM CHLORIDE 30 ML/HR: 9 INJECTION, SOLUTION INTRAVENOUS at 07:52

## 2021-09-24 RX ADMIN — PROPOFOL 100 MG: 10 INJECTION, EMULSION INTRAVENOUS at 09:30

## 2021-09-24 NOTE — ANESTHESIA POSTPROCEDURE EVALUATION
Patient: Kingsley Hunt    Procedure Summary     Date: 09/24/21 Room / Location: [facility]    Anesthesia Start: 0928 Anesthesia Stop: 0938    Procedure: CARDIOVERSION EXTERNAL IN CARDIOLOGY DEPARTMENT Diagnosis:       Tachycardia-bradycardia (HCC)      Essential hypertension      Atrial fibrillation, persistent (HCC)      Chronic anticoagulation      Dyslipidemia      Hypothyroidism (acquired)      Electrolyte imbalance      (Persistent atrial fibrillation)    Scheduled Providers: Kieran Hines MD Provider: Darrell Kerr MD    Anesthesia Type: MAC ASA Status: 3          Anesthesia Type: MAC    Vitals  No vitals data found for the desired time range.          Post Anesthesia Care and Evaluation    Patient location during evaluation: bedside  Patient participation: complete - patient participated  Level of consciousness: sleepy but conscious and responsive to verbal stimuli  Pain score: 0  Pain management: adequate  Airway patency: patent  Anesthetic complications: No anesthetic complications  PONV Status: none  Cardiovascular status: acceptable  Respiratory status: acceptable  Hydration status: acceptable

## 2021-09-24 NOTE — DISCHARGE INSTRUCTIONS
Cardioversion Discharge Instructions    Make follow up appt for 2 weeks with Dr. Hines [phone].    1) The medication, which was used to put the patient to sleep, will be acting in your body for the next twenty-four (24) hours, so you might feel a little sleepy; this feeling will slowly wear off.  Because the medicine is still in your system for the next twenty-four (24) hours, the adult patient SHOULD NOT:    a. Drive a car, operate machinery, or power tools  b. Drink any alcoholic drinks (not even beer)  c. Make any important decisions such as to sign important papers    2) We strongly suggest that a responsible adult be with the patient the rest of the day.

## 2021-09-24 NOTE — ANESTHESIA PREPROCEDURE EVALUATION
Anesthesia Evaluation     Patient summary reviewed and Nursing notes reviewed   no history of anesthetic complications:  NPO Solid Status: > 8 hours  NPO Liquid Status: > 8 hours           Airway   Dental      Pulmonary    (+) a smoker Former,   Cardiovascular     ECG reviewed  PT is on anticoagulation therapy    (+) hypertension, dysrhythmias Atrial Fib, Bradycardia, Tachycardia, hyperlipidemia,       Neuro/Psych  GI/Hepatic/Renal/Endo    (+) obesity,  GERD,  thyroid problem hypothyroidism    Musculoskeletal     Abdominal    Substance History      OB/GYN          Other   arthritis,    history of cancer                      Anesthesia Plan    ASA 3     MAC   (Patient identified; pre-operative vital signs, all relevant labs/studies, complete medical/surgical/anesthetic history, full medication list, full allergy list, and NPO status obtained/reviewed; physical assessment performed; anesthetic options, side effects, potential complications, risks, and benefits discussed; questions answered; written anesthesia consent obtained; patient cleared for procedure; anesthesia machine and equipment checked and functioning)  intravenous induction     Anesthetic plan, all risks, benefits, and alternatives have been provided, discussed and informed consent has been obtained with: patient.

## 2021-09-27 ENCOUNTER — TELEPHONE (OUTPATIENT)
Dept: CARDIOLOGY | Facility: CLINIC | Age: 81
End: 2021-09-27

## 2021-09-27 NOTE — TELEPHONE ENCOUNTER
Called and spoke with patient - advised to continue Diltiazem and hold off on vitamin.   Understood

## 2021-09-27 NOTE — TELEPHONE ENCOUNTER
Pt s/p cardioversion 9/24 states her heart is back in rhythm.   Wanting to know if she still needs to take Cartia 240 QD.     Also asking if she can take Ketosis Support or if it will effect her heart.   ingredients on bottle are    Gelatin, rice flour, magnesium stearate, silicon dioxide, calcium 62mg, magnesium 32mg,   Sodium 10mg       Advised patient would call back with answer

## 2021-10-08 ENCOUNTER — TELEPHONE (OUTPATIENT)
Dept: CARDIOLOGY | Facility: CLINIC | Age: 81
End: 2021-10-08

## 2021-10-08 DIAGNOSIS — R42 DIZZINESS: Primary | ICD-10-CM

## 2021-10-08 NOTE — TELEPHONE ENCOUNTER
Patient had to lay down in bed she is very dizzy   Thinks it is diltiazem.   Patient also taking Sectral 200 QD    Please advise, thank you

## 2021-10-08 NOTE — TELEPHONE ENCOUNTER
Dizzy yesterday while playing cards   Again this morning.   BP running 130/64 HR 47  Diltiazem 240mg QHS

## 2021-10-08 NOTE — TELEPHONE ENCOUNTER
Per Dr. Hines hold sectral tonight and start lower dose tomorrow.   Sectral is a capsule per pt and unable to cut in half.   Advised pt I had reached out to Dr. Hines to ask about possible change is medication.   Still do not take Sectral and would call back with answer on medications.   Dr. Hines wants patient to have a 30 EM placed.   Order is in chart.   Advised patient she can come in Monday morning to have that placed.   Pt wanting someone to look in ear to see if inner ear causing dizziness.   Advised she will need to contact PCP for that.   Understood

## 2021-10-11 ENCOUNTER — TELEPHONE (OUTPATIENT)
Dept: CARDIOLOGY | Facility: CLINIC | Age: 81
End: 2021-10-11

## 2021-10-19 ENCOUNTER — TELEPHONE (OUTPATIENT)
Dept: CARDIOLOGY | Facility: CLINIC | Age: 81
End: 2021-10-19

## 2021-10-27 ENCOUNTER — OFFICE VISIT (OUTPATIENT)
Dept: CARDIOLOGY | Facility: CLINIC | Age: 81
End: 2021-10-27

## 2021-10-27 VITALS
SYSTOLIC BLOOD PRESSURE: 154 MMHG | BODY MASS INDEX: 29.69 KG/M2 | HEART RATE: 48 BPM | OXYGEN SATURATION: 99 % | WEIGHT: 184.75 LBS | HEIGHT: 66 IN | DIASTOLIC BLOOD PRESSURE: 79 MMHG

## 2021-10-27 DIAGNOSIS — I10 ESSENTIAL HYPERTENSION: ICD-10-CM

## 2021-10-27 DIAGNOSIS — I48.19 ATRIAL FIBRILLATION, PERSISTENT (HCC): ICD-10-CM

## 2021-10-27 DIAGNOSIS — Z79.01 CHRONIC ANTICOAGULATION: ICD-10-CM

## 2021-10-27 DIAGNOSIS — E03.9 HYPOTHYROIDISM (ACQUIRED): ICD-10-CM

## 2021-10-27 DIAGNOSIS — I49.5 TACHYCARDIA-BRADYCARDIA (HCC): Primary | ICD-10-CM

## 2021-10-27 DIAGNOSIS — E78.5 DYSLIPIDEMIA: ICD-10-CM

## 2021-10-27 PROCEDURE — 99214 OFFICE O/P EST MOD 30 MIN: CPT | Performed by: INTERNAL MEDICINE

## 2021-10-27 PROCEDURE — 93000 ELECTROCARDIOGRAM COMPLETE: CPT | Performed by: INTERNAL MEDICINE

## 2021-10-27 RX ORDER — BLOOD SUGAR DIAGNOSTIC
STRIP MISCELLANEOUS
COMMUNITY
Start: 2021-10-14

## 2021-10-27 RX ORDER — IMIQUIMOD 12.5 MG/.25G
CREAM TOPICAL
COMMUNITY
Start: 2021-10-21 | End: 2023-03-20

## 2021-10-27 NOTE — TELEPHONE ENCOUNTER
Rx Refill Note  Requested Prescriptions     Pending Prescriptions Disp Refills   • apixaban (ELIQUIS) 5 MG tablet tablet 180 tablet 3     Sig: Take 1 tablet by mouth Every 12 (Twelve) Hours.      Last office visit with prescribing clinician: 10/27/2021      Next office visit with prescribing clinician: 1/26/2022            Susan Bey MA  10/27/21, 13:14 EDT

## 2021-10-27 NOTE — PROGRESS NOTES
Encounter Date:10/27/2021  Last seen 9/13/2021      Patient ID: Kingsley Hunt is a 81 y.o. female.    Chief Complaint:  Tachy-bradycardia syndrome  Hypertension  Dyslipidemia  Hypothyroidism  History of atrial fibrillation  Status post cardioversion 9/11/2020     History of Present Illness  Patient had cardioversion 9/24/2021  Patient is feeling better since she was cardioverted.    Since I have last seen, the patient has been without any chest discomfort ,shortness of breath, palpitations, dizziness or syncope.  Denies having any headache ,abdominal pain ,nausea, vomiting , diarrhea constipation, loss of weight or loss of appetite.  Denies having any excessive bruising ,hematuria or blood in the stool.    Review of all systems negative except as indicated.    Reviewed ROS.    Assessment and Plan         ]]]]]]]]]]]]]]]]]]]]]]    Impression  ===========  -Persistent atrial fibrillation with moderate ventricular response.  Status post cardioversion 9/11/2020  Patient has converted and was maintaining  Went back into atrial fibrillation.  EKG today 4/20/2021 revealed atrial fibrillation with controlled ventricular response  Status post cardioversion 9/24/2021  Patient had bradycardia after cardioversion and Cardizem was discontinued.      -History of tachy-clay syndrome.  Patient has history of SVT and she had sinus bradycardia heart rates in the 44 per minute in the past.     -hypothyroidism hypertension dyslipidemia .  Borderline diabetes     -status post appendectomy     Family history is positive for coronary artery disease     -allergic to morphine.  Patient apparently does not have any problems with allergy to morphine anymore     -former smoker.  Quit 30 years ago  ===============  Plan  ==========  Persistent atrial fibrillation  Status post cardioversion for persistent atrial fibrillation 9/11/2020.  Patient has improved significantly and is feeling great.  Continue acebutolol to 200 mg once a day and  continue losartan.  Patient recently was started on Cardizem  mg a day.     Patient had recurrence of atrial fibrillation  EKG shows atrial fibrillation with controlled ventricular response.  Status post cardioversion 9/24/2021.  Patient was taken off Cardizem due to bradycardia.  Patient is feeling better although she has sinus bradycardia.  EKG showed sinus bradycardia with a heart rate of 44/min  Continue acebutolol 200 mg a day and losartan.  Patient is off Cardizem.     Anticoagulation status was reviewed.  Patient is  on Eliquis 5 mg twice a day.  Patient is not interested in going on any Coumadin at this time.     Follow-up in the office in 6 weeks with EKG.  If patient has recurrence of atrial fibrillation develop symptomatic bradycardia consideration will be given for permanent pacemaker implantation.    Medications were reviewed and updated.  Further plan will depend on patient's progress.  ]]]]]]]]]]]]]]]]]                 Diagnosis Plan   1. Tachycardia-bradycardia (HCC)     2. Chronic anticoagulation     3. Essential hypertension     4. Dyslipidemia     5. Atrial fibrillation, persistent (HCC)     6. Hypothyroidism (acquired)     LAB RESULTS (LAST 7 DAYS)    CBC        BMP        CMP         BNP        TROPONIN        CoAg        Creatinine Clearance  CrCl cannot be calculated (Patient's most recent lab result is older than the maximum 30 days allowed.).    ABG        Radiology  No radiology results for the last day                The following portions of the patient's history were reviewed and updated as appropriate: allergies, current medications, past family history, past medical history, past social history, past surgical history and problem list.    Review of Systems   Constitutional: Negative for fever and malaise/fatigue.   HENT: Negative for ear pain and nosebleeds.    Eyes: Negative for blurred vision and double vision.   Cardiovascular: Negative for chest pain, dyspnea on exertion and  palpitations.   Respiratory: Negative for cough and shortness of breath.    Skin: Negative for rash.   Musculoskeletal: Negative for joint pain.   Gastrointestinal: Negative for abdominal pain, nausea and vomiting.   Neurological: Negative for focal weakness and headaches.   Psychiatric/Behavioral: Negative for depression. The patient is not nervous/anxious.    All other systems reviewed and are negative.        Current Outpatient Medications:   •  acebutolol (SECTRAL) 200 MG capsule, Take 200 mg by mouth Daily., Disp: , Rfl:   •  apixaban (ELIQUIS) 5 MG tablet tablet, Take 5 mg by mouth 2 (Two) Times a Day., Disp: , Rfl:   •  Ascorbic Acid (VITAMIN C) 500 MG capsule, Take 500 mg by mouth Daily. 4 caps / day, Disp: , Rfl:   •  Cholecalciferol (VITAMIN D3) 125 MCG (5000 UT) capsule capsule, Take 5,000 Units by mouth Daily., Disp: , Rfl:   •  dilTIAZem CD (CARDIZEM CD) 240 MG 24 hr capsule, Take 1 capsule by mouth Daily., Disp: 30 capsule, Rfl: 3  •  hydroCHLOROthiazide (HYDRODIURIL) 12.5 MG tablet, Take 12.5 mg by mouth Daily., Disp: , Rfl:   •  levothyroxine (SYNTHROID, LEVOTHROID) 50 MCG tablet, Take 50 mcg by mouth Daily., Disp: , Rfl:   •  losartan (COZAAR) 100 MG tablet, Take 100 mg by mouth Daily., Disp: , Rfl:   •  omeprazole (priLOSEC) 20 MG capsule, Take 20 mg by mouth 2 (two) times a day., Disp: , Rfl:   •  polyethylene glycol (MIRALAX) packet, Take 17 g by mouth Daily As Needed., Disp: , Rfl:   •  ProAir  (90 Base) MCG/ACT inhaler, Inhale 2 puffs Every 6 (Six) Hours As Needed., Disp: , Rfl:   •  vitamin B-12 (CYANOCOBALAMIN) 100 MCG tablet, Take 50 mcg by mouth Daily., Disp: , Rfl:   •  vitamin B-6 (PYRIDOXINE) 100 MG tablet, Take 100 mg by mouth Daily., Disp: , Rfl:   •  Zinc 50 MG tablet, Take 50 mg by mouth Daily., Disp: , Rfl:   •  Accu-Chek Guide test strip, test BLOOD SUGAR AT least TWICE A WEEK, Disp: , Rfl:   •  imiquimod (ALDARA) 5 % cream, Apply SMALL AMOUNT TO TO THE AFFECTED AREA on the  "nose EVERY NIGHT AT BEDTIME X TWO (2) WEEKS, THEN STOP FOR TWO (2) WEEKS, THEN REPEAT., Disp: , Rfl:     No Known Allergies    Family History   Problem Relation Age of Onset   • Cancer Mother    • Diabetes Sister    • Heart disease Sister    • Diabetes Son        Past Surgical History:   Procedure Laterality Date   • APPENDECTOMY     • COLONOSCOPY     • ENDOSCOPY     • EYE SURGERY     • SKIN BIOPSY         Past Medical History:   Diagnosis Date   • Arthritis    • Cancer (HCC)     Skin Cancer   • Disease of thyroid gland    • Elevated cholesterol    • GERD (gastroesophageal reflux disease)    • Hypertension        Family History   Problem Relation Age of Onset   • Cancer Mother    • Diabetes Sister    • Heart disease Sister    • Diabetes Son        Social History     Socioeconomic History   • Marital status:    Tobacco Use   • Smoking status: Former Smoker   • Smokeless tobacco: Never Used   • Tobacco comment: Quit 30+ years ago   Vaping Use   • Vaping Use: Never used   Substance and Sexual Activity   • Alcohol use: Yes     Alcohol/week: 8.0 standard drinks     Types: 8 Glasses of wine per week   • Drug use: No   • Sexual activity: Defer           ECG 12 Lead    Date/Time: 10/27/2021 1:13 PM  Performed by: Kieran Hines MD  Authorized by: Kieran Hines MD   Comparison: compared with previous ECG   Comparison to previous ECG: Sinus bradycardia 44/min normal axis normal intervals no ectopy nonspecific ST-T wave changes compared to 9/13/2021 patient has converted to sinus rhythm from atrial fibrillation.                Objective:       Physical Exam    /79   Pulse (!) 48   Ht 167.6 cm (66\")   Wt 83.8 kg (184 lb 12 oz)   SpO2 99%   BMI 29.82 kg/m²   The patient is alert, oriented and in no distress.    Vital signs as noted above.    Head and neck revealed no carotid bruits or jugular venous distension.  No thyromegaly or lymphadenopathy is present.    Lungs clear.  No wheezing.  Breath sounds " are normal bilaterally.    Heart normal first and second heart sounds.  No murmur..  No pericardial rub is present.  No gallop is present.    Abdomen soft and nontender.  No organomegaly is present.    Extremities revealed good peripheral pulses without any pedal edema.    Skin warm and dry.    Musculoskeletal system is grossly normal.    CNS grossly normal.    Reviewed and unchanged from last visit.

## 2021-11-04 ENCOUNTER — TELEPHONE (OUTPATIENT)
Dept: CARDIOLOGY | Facility: CLINIC | Age: 81
End: 2021-11-04

## 2021-11-04 NOTE — TELEPHONE ENCOUNTER
Wants call back from nurse to talk about monitor. I asked if she was having problems with it and she said that is what she needs to speak to the nurse about.

## 2021-11-04 NOTE — TELEPHONE ENCOUNTER
Kingsley still needs to speak to someone about the monitor, she is 5 days short of wearing 1 month, chest is broken out from patches, can she forfeit 5 days and mail monitor back tomorrow ?

## 2021-11-08 NOTE — TELEPHONE ENCOUNTER
Spoke with pt - she took monitor off yesterday. Says she started breaking out on chest and face has not mailed it back yet is going to tomorrow.   understood

## 2021-11-16 ENCOUNTER — OFFICE VISIT (OUTPATIENT)
Dept: CARDIOLOGY | Facility: CLINIC | Age: 81
End: 2021-11-16

## 2021-11-16 VITALS
HEIGHT: 66 IN | WEIGHT: 187 LBS | DIASTOLIC BLOOD PRESSURE: 73 MMHG | BODY MASS INDEX: 30.05 KG/M2 | OXYGEN SATURATION: 96 % | HEART RATE: 50 BPM | SYSTOLIC BLOOD PRESSURE: 176 MMHG

## 2021-11-16 DIAGNOSIS — I48.19 ATRIAL FIBRILLATION, PERSISTENT (HCC): ICD-10-CM

## 2021-11-16 DIAGNOSIS — I10 ESSENTIAL HYPERTENSION: ICD-10-CM

## 2021-11-16 DIAGNOSIS — E78.5 DYSLIPIDEMIA: ICD-10-CM

## 2021-11-16 DIAGNOSIS — Z79.01 CHRONIC ANTICOAGULATION: ICD-10-CM

## 2021-11-16 DIAGNOSIS — I49.5 TACHYCARDIA-BRADYCARDIA (HCC): Primary | ICD-10-CM

## 2021-11-16 DIAGNOSIS — E03.9 HYPOTHYROIDISM (ACQUIRED): ICD-10-CM

## 2021-11-16 DIAGNOSIS — R42 DIZZINESS: ICD-10-CM

## 2021-11-16 PROCEDURE — 99214 OFFICE O/P EST MOD 30 MIN: CPT | Performed by: INTERNAL MEDICINE

## 2021-11-16 NOTE — PROGRESS NOTES
Encounter Date:11/16/2021  Last seen 10/27/2021      Patient ID: Kingsley Hunt is a 81 y.o. female.    Chief Complaint:    Tachy-bradycardia syndrome  Hypertension  Dyslipidemia  Hypothyroidism  History of atrial fibrillation  Status post cardioversion 9/11/2020     History of Present Illness  Patient recently had 30-day event monitor.    Patient is asymptomatic without any dizziness or syncope.    Since I have last seen, the patient has been without any chest discomfort ,shortness of breath, palpitations, dizziness or syncope.  Denies having any headache ,abdominal pain ,nausea, vomiting , diarrhea constipation, loss of weight or loss of appetite.  Denies having any excessive bruising ,hematuria or blood in the stool.     Review of all systems negative except as indicated.     Reviewed ROS.    Assessment and Plan         ]]]]]]]]]]]]]]]]]]]]]]    Impression  ===========  -Persistent atrial fibrillation with moderate ventricular response.  Status post cardioversion 9/11/2020  Patient has converted and was maintaining  Went back into atrial fibrillation.  EKG today 4/20/2021 revealed atrial fibrillation with controlled ventricular response  Status post cardioversion 9/24/2021  Patient had bradycardia after cardioversion and Cardizem was discontinued.      -History of tachy-clay syndrome.  Patient has history of SVT and she had sinus bradycardia heart rates in the 44 per minute in the past.     -hypothyroidism hypertension dyslipidemia .  Borderline diabetes     -status post appendectomy     Family history is positive for coronary artery disease     -allergic to morphine.  Patient apparently does not have any problems with allergy to morphine anymore     -former smoker.  Quit 30 years ago  ===============  Plan  ==========  History of persistent atrial fibrillation  Status post cardioversion 9/11/2020  Status post cardioversion 9/24/2021 for recurrence of atrial fibrillation  Continue acebutolol 200 mg a day and  losartan.  Patient is off Cardizem due to bradycardia.    Anticoagulation status was reviewed.  Patient is  on Eliquis 5 mg twice a day.  Patient is not interested in going on any Coumadin at this time.    Recent 30-day event monitor showed the following.  Basic rhythm is sinus with bradycardia and tachycardia with rates varying between /min.  1 episode of 2.4-second pause was noted.  One episode of 5 beat run of nonsustained ventricular tachycardia was noted.  Occasional premature ventricular and blocked premature atrial contractions were seen.    However patient is totally asymptomatic and is doing well.  We will observe her closely and monitor her symptoms.  Patient is not interested in considering pacemaker at this time.     Follow-up in the office.  Regularly scheduled appointment.  If patient has recurrence of atrial fibrillation develop symptomatic bradycardia consideration will be given for permanent pacemaker implantation.     Medications were reviewed and updated.    Further plan will depend on patient's progress.  ]]]]]]]]]]]]]]]]]                   Diagnosis Plan   1. Tachycardia-bradycardia (HCC)     2. Chronic anticoagulation     3. Essential hypertension     4. Dyslipidemia     5. Atrial fibrillation, persistent (HCC)     6. Hypothyroidism (acquired)     7. Dizziness     LAB RESULTS (LAST 7 DAYS)    CBC        BMP        CMP         BNP        TROPONIN        CoAg        Creatinine Clearance  CrCl cannot be calculated (Patient's most recent lab result is older than the maximum 30 days allowed.).    ABG        Radiology  No radiology results for the last day                The following portions of the patient's history were reviewed and updated as appropriate: allergies, current medications, past family history, past medical history, past social history, past surgical history and problem list.    Review of Systems   Constitutional: Negative for malaise/fatigue.   Cardiovascular: Negative for  chest pain, leg swelling, palpitations and syncope.   Respiratory: Negative for shortness of breath.    Skin: Negative for rash.   Gastrointestinal: Negative for nausea and vomiting.   Neurological: Negative for dizziness, light-headedness and numbness.   All other systems reviewed and are negative.        Current Outpatient Medications:   •  Accu-Chek Guide test strip, test BLOOD SUGAR AT least TWICE A WEEK, Disp: , Rfl:   •  acebutolol (SECTRAL) 200 MG capsule, Take 200 mg by mouth Daily., Disp: , Rfl:   •  apixaban (ELIQUIS) 5 MG tablet tablet, Take 1 tablet by mouth Every 12 (Twelve) Hours., Disp: 180 tablet, Rfl: 3  •  Ascorbic Acid (VITAMIN C) 500 MG capsule, Take 500 mg by mouth Daily. 4 caps / day, Disp: , Rfl:   •  Cholecalciferol (VITAMIN D3) 125 MCG (5000 UT) capsule capsule, Take 5,000 Units by mouth Daily., Disp: , Rfl:   •  hydroCHLOROthiazide (HYDRODIURIL) 12.5 MG tablet, Take 12.5 mg by mouth Daily., Disp: , Rfl:   •  levothyroxine (SYNTHROID, LEVOTHROID) 50 MCG tablet, Take 50 mcg by mouth Daily., Disp: , Rfl:   •  losartan (COZAAR) 100 MG tablet, Take 100 mg by mouth Daily., Disp: , Rfl:   •  omeprazole (priLOSEC) 20 MG capsule, Take 20 mg by mouth 2 (two) times a day., Disp: , Rfl:   •  polyethylene glycol (MIRALAX) packet, Take 17 g by mouth Daily As Needed., Disp: , Rfl:   •  vitamin B-12 (CYANOCOBALAMIN) 100 MCG tablet, Take 50 mcg by mouth Daily., Disp: , Rfl:   •  vitamin B-6 (PYRIDOXINE) 100 MG tablet, Take 100 mg by mouth Daily., Disp: , Rfl:   •  Zinc 50 MG tablet, Take 50 mg by mouth Daily., Disp: , Rfl:   •  dilTIAZem CD (CARDIZEM CD) 240 MG 24 hr capsule, Take 1 capsule by mouth Daily., Disp: 30 capsule, Rfl: 3  •  imiquimod (ALDARA) 5 % cream, Apply SMALL AMOUNT TO TO THE AFFECTED AREA on the nose EVERY NIGHT AT BEDTIME X TWO (2) WEEKS, THEN STOP FOR TWO (2) WEEKS, THEN REPEAT., Disp: , Rfl:   •  ProAir  (90 Base) MCG/ACT inhaler, Inhale 2 puffs Every 6 (Six) Hours As Needed.,  "Disp: , Rfl:     No Known Allergies    Family History   Problem Relation Age of Onset   • Cancer Mother    • Diabetes Sister    • Heart disease Sister    • Diabetes Son        Past Surgical History:   Procedure Laterality Date   • APPENDECTOMY     • COLONOSCOPY     • ENDOSCOPY     • EYE SURGERY     • SKIN BIOPSY         Past Medical History:   Diagnosis Date   • Arthritis    • Cancer (HCC)     Skin Cancer   • Disease of thyroid gland    • Elevated cholesterol    • GERD (gastroesophageal reflux disease)    • Hypertension        Family History   Problem Relation Age of Onset   • Cancer Mother    • Diabetes Sister    • Heart disease Sister    • Diabetes Son        Social History     Socioeconomic History   • Marital status:    Tobacco Use   • Smoking status: Former Smoker   • Smokeless tobacco: Never Used   • Tobacco comment: Quit 30+ years ago   Vaping Use   • Vaping Use: Never used   Substance and Sexual Activity   • Alcohol use: Yes     Alcohol/week: 8.0 standard drinks     Types: 8 Glasses of wine per week   • Drug use: No   • Sexual activity: Defer         Procedures      Objective:       Physical Exam    /73 (BP Location: Left arm, Patient Position: Sitting, Cuff Size: Adult)   Pulse 50   Ht 167.6 cm (66\")   Wt 84.8 kg (187 lb)   SpO2 96%   BMI 30.18 kg/m²   The patient is alert, oriented and in no distress.    Vital signs as noted above.    Head and neck revealed no carotid bruits or jugular venous distension.  No thyromegaly or lymphadenopathy is present.    Lungs clear.  No wheezing.  Breath sounds are normal bilaterally.    Heart normal first and second heart sounds.  No murmur..  No pericardial rub is present.  No gallop is present.    Abdomen soft and nontender.  No organomegaly is present.    Extremities revealed good peripheral pulses without any pedal edema.    Skin warm and dry.    Musculoskeletal system is grossly normal.    CNS grossly normal.    Reviewed and unchanged from last " visit.

## 2021-11-19 ENCOUNTER — TELEPHONE (OUTPATIENT)
Dept: CARDIOLOGY | Facility: CLINIC | Age: 81
End: 2021-11-19

## 2021-11-19 NOTE — TELEPHONE ENCOUNTER
Pt started taking Diltiazem 240mg QHS again and says it is now causing her to be nauseous. Says she doesn't think she will be able to take medication is there anything else she can try?

## 2021-11-19 NOTE — TELEPHONE ENCOUNTER
PATIENT LEFT VM THAT SHE HAD LEFT A MESSAGE YESTERDAY REGARDING HER MEDICATION FOR A CALL BACK. I DONT SEE ANYTHING, BUT IM NOT SURE IF IT WAS A VM. PLEASE CALL PT TO DISCUSS MEDICATION QUESTIONS.

## 2021-12-22 ENCOUNTER — TELEPHONE (OUTPATIENT)
Dept: CARDIOLOGY | Facility: CLINIC | Age: 81
End: 2021-12-22

## 2022-01-17 ENCOUNTER — OFFICE VISIT (OUTPATIENT)
Dept: CARDIOLOGY | Facility: CLINIC | Age: 82
End: 2022-01-17

## 2022-01-17 VITALS
SYSTOLIC BLOOD PRESSURE: 180 MMHG | DIASTOLIC BLOOD PRESSURE: 93 MMHG | WEIGHT: 188 LBS | HEART RATE: 80 BPM | HEIGHT: 66 IN | OXYGEN SATURATION: 98 % | BODY MASS INDEX: 30.22 KG/M2

## 2022-01-17 DIAGNOSIS — Z79.01 CHRONIC ANTICOAGULATION: ICD-10-CM

## 2022-01-17 DIAGNOSIS — I48.19 ATRIAL FIBRILLATION, PERSISTENT: ICD-10-CM

## 2022-01-17 DIAGNOSIS — I10 ESSENTIAL HYPERTENSION: ICD-10-CM

## 2022-01-17 DIAGNOSIS — I49.5 TACHYCARDIA-BRADYCARDIA: Primary | ICD-10-CM

## 2022-01-17 DIAGNOSIS — E03.9 HYPOTHYROIDISM (ACQUIRED): ICD-10-CM

## 2022-01-17 DIAGNOSIS — E78.5 DYSLIPIDEMIA: ICD-10-CM

## 2022-01-17 PROCEDURE — 99214 OFFICE O/P EST MOD 30 MIN: CPT | Performed by: INTERNAL MEDICINE

## 2022-01-17 PROCEDURE — 93000 ELECTROCARDIOGRAM COMPLETE: CPT | Performed by: INTERNAL MEDICINE

## 2022-01-17 NOTE — PROGRESS NOTES
Encounter Date:01/17/2022  Last seen 11/16/2021      Patient ID: Kingsley Hunt is a 81 y.o. female.    Chief Complaint:  Tachy-bradycardia syndrome  Hypertension  Dyslipidemia  Hypothyroidism  History of atrial fibrillation  Status post cardioversion 9/11/2020     History of Present Illness  /patient had numbness in the left arm for 3 days few weeks back and has improved now.  Since I have last seen, the patient has been without any chest discomfort ,shortness of breath, palpitations, dizziness or syncope.  Denies having any headache ,abdominal pain ,nausea, vomiting , diarrhea constipation, loss of weight or loss of appetite.  Denies having any excessive bruising ,hematuria or blood in the stool.    Review of all systems negative except as indicated.    Reviewed ROS.    Assessment and Plan         ]]]]]]]]]]]]]]]]]]]]]]    Impression  ===========  -Persistent atrial fibrillation with moderate ventricular response.  Status post cardioversion 9/11/2020  Patient has converted and was maintaining  Went back into atrial fibrillation.  EKG today 4/20/2021 revealed atrial fibrillation with controlled ventricular response  Status post cardioversion 9/24/2021  Patient had bradycardia after cardioversion and Cardizem was discontinued.  Patient is in atrial fibrillation-1/17/2022      -History of tachy-clay syndrome.  Patient has history of SVT and she had sinus bradycardia heart rates in the 44 per minute in the past.     -hypothyroidism hypertension dyslipidemia .  Borderline diabetes     -status post appendectomy     Family history is positive for coronary artery disease     -allergic to morphine.  Patient apparently does not have any problems with allergy to morphine anymore     -former smoker.  Quit 30 years ago  ===============  Plan  ==========  History of persistent atrial fibrillation  Status post cardioversion 9/11/2020  Status post cardioversion 9/24/2021 for recurrence of atrial fibrillation  Continue  acebutolol 200 mg a day and losartan.  Patient is off Cardizem due to bradycardia.  Patient is in atrial fibrillation-1/17/2022     Anticoagulation status was reviewed.  Patient is  on Eliquis 5 mg twice a day.  Patient is not interested in going on any Coumadin at this time.     30-day event monitor showed the following.  Basic rhythm is sinus with bradycardia and tachycardia with rates varying between /min.  1 episode of 2.4-second pause was noted.  One episode of 5 beat run of nonsustained ventricular tachycardia was noted.  Occasional premature ventricular and blocked premature atrial contractions were seen.     However patient is totally asymptomatic and is doing well.  We will observe her closely and monitor her symptoms.  Patient is not interested in considering pacemaker at this time.     Follow-up in the office in 6 months.  If patient has recurrence of atrial fibrillation develop symptomatic bradycardia consideration will be given for permanent pacemaker implantation.     Medications were reviewed and updated.     Further plan will depend on patient's progress.  ]]]]]]]]]]]]]]]]]                        Diagnosis Plan   1. Tachycardia-bradycardia (HCC)     2. Chronic anticoagulation     3. Essential hypertension     4. Hypothyroidism (acquired)     5. Atrial fibrillation, persistent (HCC)     6. Dyslipidemia     LAB RESULTS (LAST 7 DAYS)    CBC        BMP        CMP         BNP        TROPONIN        CoAg        Creatinine Clearance  CrCl cannot be calculated (Patient's most recent lab result is older than the maximum 30 days allowed.).    ABG        Radiology  No radiology results for the last day                The following portions of the patient's history were reviewed and updated as appropriate: allergies, current medications, past family history, past medical history, past social history, past surgical history and problem list.    Review of Systems   Constitutional: Negative for  malaise/fatigue.   Cardiovascular: Negative for chest pain, leg swelling, palpitations and syncope.   Respiratory: Negative for shortness of breath.    Skin: Negative for rash.   Gastrointestinal: Negative for nausea and vomiting.   Neurological: Negative for dizziness, light-headedness and numbness.   All other systems reviewed and are negative.        Current Outpatient Medications:   •  Accu-Chek Guide test strip, test BLOOD SUGAR AT least TWICE A WEEK, Disp: , Rfl:   •  acebutolol (SECTRAL) 200 MG capsule, Take 200 mg by mouth Daily., Disp: , Rfl:   •  apixaban (ELIQUIS) 5 MG tablet tablet, Take 1 tablet by mouth Every 12 (Twelve) Hours., Disp: 180 tablet, Rfl: 3  •  Ascorbic Acid (VITAMIN C) 500 MG capsule, Take 500 mg by mouth Daily. 4 caps / day, Disp: , Rfl:   •  Cholecalciferol (VITAMIN D3) 125 MCG (5000 UT) capsule capsule, Take 5,000 Units by mouth Daily., Disp: , Rfl:   •  dilTIAZem CD (CARDIZEM CD) 240 MG 24 hr capsule, Take 1 capsule by mouth Daily., Disp: 30 capsule, Rfl: 3  •  hydroCHLOROthiazide (HYDRODIURIL) 12.5 MG tablet, Take 12.5 mg by mouth Daily., Disp: , Rfl:   •  levothyroxine (SYNTHROID, LEVOTHROID) 50 MCG tablet, Take 50 mcg by mouth Daily., Disp: , Rfl:   •  losartan (COZAAR) 100 MG tablet, Take 100 mg by mouth Daily., Disp: , Rfl:   •  vitamin B-12 (CYANOCOBALAMIN) 100 MCG tablet, Take 50 mcg by mouth Daily., Disp: , Rfl:   •  vitamin B-6 (PYRIDOXINE) 100 MG tablet, Take 100 mg by mouth Daily., Disp: , Rfl:   •  Zinc 50 MG tablet, Take 50 mg by mouth Daily., Disp: , Rfl:   •  imiquimod (ALDARA) 5 % cream, Apply SMALL AMOUNT TO TO THE AFFECTED AREA on the nose EVERY NIGHT AT BEDTIME X TWO (2) WEEKS, THEN STOP FOR TWO (2) WEEKS, THEN REPEAT., Disp: , Rfl:   •  omeprazole (priLOSEC) 20 MG capsule, Take 20 mg by mouth 2 (two) times a day., Disp: , Rfl:   •  polyethylene glycol (MIRALAX) packet, Take 17 g by mouth Daily As Needed., Disp: , Rfl:   •  ProAir  (90 Base) MCG/ACT inhaler,  "Inhale 2 puffs Every 6 (Six) Hours As Needed., Disp: , Rfl:     No Known Allergies    Family History   Problem Relation Age of Onset   • Cancer Mother    • Diabetes Sister    • Heart disease Sister    • Diabetes Son        Past Surgical History:   Procedure Laterality Date   • APPENDECTOMY     • COLONOSCOPY     • ENDOSCOPY     • EYE SURGERY     • SKIN BIOPSY         Past Medical History:   Diagnosis Date   • Arthritis    • Cancer (HCC)     Skin Cancer   • Disease of thyroid gland    • Elevated cholesterol    • GERD (gastroesophageal reflux disease)    • Hypertension        Family History   Problem Relation Age of Onset   • Cancer Mother    • Diabetes Sister    • Heart disease Sister    • Diabetes Son        Social History     Socioeconomic History   • Marital status:    Tobacco Use   • Smoking status: Former Smoker   • Smokeless tobacco: Never Used   • Tobacco comment: Quit 30+ years ago   Vaping Use   • Vaping Use: Never used   Substance and Sexual Activity   • Alcohol use: Yes     Alcohol/week: 8.0 standard drinks     Types: 8 Glasses of wine per week   • Drug use: No   • Sexual activity: Defer           ECG 12 Lead    Date/Time: 1/17/2022 12:19 PM  Performed by: Kieran Hines MD  Authorized by: Kieran Hines MD   Comparison: compared with previous ECG   Comparison to previous ECG: Atrial fibrillation with controlled ventricular response 75/min nonspecific ST-T wave changes normal axis normal intervals no ectopy change from 10/27/2021 patient was in sinus bradycardia.                Objective:       Physical Exam    /93 (BP Location: Right arm, Patient Position: Sitting, Cuff Size: Adult)   Pulse 80   Ht 167.6 cm (66\")   Wt 85.3 kg (188 lb)   SpO2 98%   BMI 30.34 kg/m²   The patient is alert, oriented and in no distress.    Vital signs as noted above.    Head and neck revealed no carotid bruits or jugular venous distension.  No thyromegaly or lymphadenopathy is present.    Lungs clear.  " No wheezing.  Breath sounds are normal bilaterally.    Heart normal first and second heart sounds.  No murmur..  No pericardial rub is present.  No gallop is present.    Abdomen soft and nontender.  No organomegaly is present.    Extremities revealed good peripheral pulses without any pedal edema.    Skin warm and dry.    Musculoskeletal system is grossly normal.    CNS grossly normal.    Reviewed and unchanged from last visit.

## 2022-02-23 ENCOUNTER — TRANSCRIBE ORDERS (OUTPATIENT)
Dept: ADMINISTRATIVE | Facility: HOSPITAL | Age: 82
End: 2022-02-23

## 2022-02-23 DIAGNOSIS — Z13.9 SCREENING DUE: Primary | ICD-10-CM

## 2022-03-21 ENCOUNTER — OFFICE (AMBULATORY)
Dept: URBAN - METROPOLITAN AREA CLINIC 64 | Facility: CLINIC | Age: 82
End: 2022-03-21

## 2022-03-21 VITALS
HEART RATE: 65 BPM | HEIGHT: 66 IN | WEIGHT: 178 LBS | SYSTOLIC BLOOD PRESSURE: 133 MMHG | DIASTOLIC BLOOD PRESSURE: 78 MMHG

## 2022-03-21 DIAGNOSIS — K59.00 CONSTIPATION, UNSPECIFIED: ICD-10-CM

## 2022-03-21 DIAGNOSIS — K21.9 GASTRO-ESOPHAGEAL REFLUX DISEASE WITHOUT ESOPHAGITIS: ICD-10-CM

## 2022-03-21 PROCEDURE — 99213 OFFICE O/P EST LOW 20 MIN: CPT | Performed by: INTERNAL MEDICINE

## 2022-03-21 RX ORDER — POLYETHYLENE GLYCOL 3350 17 G/17G
17 POWDER, FOR SOLUTION ORAL
Qty: 1 | Refills: 11 | Status: COMPLETED
Start: 2022-03-21 | End: 2023-08-16

## 2022-04-22 ENCOUNTER — HOSPITAL ENCOUNTER (OUTPATIENT)
Dept: CT IMAGING | Facility: HOSPITAL | Age: 82
Discharge: HOME | End: 2022-04-22
Admitting: EMERGENCY MEDICINE

## 2022-04-22 DIAGNOSIS — Z13.9 SCREENING DUE: ICD-10-CM

## 2022-04-22 PROCEDURE — 75571 CT HRT W/O DYE W/CA TEST: CPT

## 2022-05-04 ENCOUNTER — OFFICE (AMBULATORY)
Dept: URBAN - METROPOLITAN AREA CLINIC 64 | Facility: CLINIC | Age: 82
End: 2022-05-04

## 2022-05-04 VITALS
HEIGHT: 66 IN | DIASTOLIC BLOOD PRESSURE: 78 MMHG | SYSTOLIC BLOOD PRESSURE: 129 MMHG | WEIGHT: 169 LBS | HEART RATE: 68 BPM

## 2022-05-04 DIAGNOSIS — K64.8 OTHER HEMORRHOIDS: ICD-10-CM

## 2022-05-04 PROCEDURE — 99213 OFFICE O/P EST LOW 20 MIN: CPT | Performed by: INTERNAL MEDICINE

## 2022-05-05 ENCOUNTER — OFFICE VISIT (OUTPATIENT)
Dept: SURGERY | Facility: CLINIC | Age: 82
End: 2022-05-05

## 2022-05-05 VITALS
HEIGHT: 66 IN | WEIGHT: 171 LBS | TEMPERATURE: 98.9 F | SYSTOLIC BLOOD PRESSURE: 121 MMHG | DIASTOLIC BLOOD PRESSURE: 77 MMHG | BODY MASS INDEX: 27.48 KG/M2 | HEART RATE: 97 BPM | OXYGEN SATURATION: 96 %

## 2022-05-05 DIAGNOSIS — K64.4 EXTERNAL HEMORRHOIDS: Primary | ICD-10-CM

## 2022-05-05 PROCEDURE — 99203 OFFICE O/P NEW LOW 30 MIN: CPT | Performed by: SURGERY

## 2022-05-05 NOTE — PROGRESS NOTES
"Subjective   Kingsley Hunt is a 81 y.o. female.   Chief Complaint   Patient presents with   • New Patient     Thrombosed Hemorrhoid and Skin Tags     /77 (Cuff Size: Adult)   Pulse 97   Temp 98.9 °F (37.2 °C) (Infrared)   Ht 167.6 cm (66\")   Wt 77.6 kg (171 lb)   SpO2 96%   BMI 27.60 kg/m²     HISTORY OF PRESENT ILLNESS:  81-year-old lady with a 60-year history of hemorrhoids who has been following with gastroenterology for many years most recent endoscopy was about 3 years ago no known history of cancer.  He says that about 3 weeks ago she noticed a worsening of her hemorrhoids she had a lump that formed it was nonpainful she does not have any bleeding she continues to have no pain or bleeding.  She has tried topical therapy but the lump does not resolve.  She is not sure if it occurred after straining or not it just popped up on its own and has not resolved.  She was seen by gastroenterology diagnosed with external hemorrhoid and referred over to me to consider surgical management.  She is on Eliquis for atrial fibrillation.      Outpatient Encounter Medications as of 5/5/2022   Medication Sig Dispense Refill   • Accu-Chek Guide test strip test BLOOD SUGAR AT least TWICE A WEEK     • acebutolol (SECTRAL) 200 MG capsule Take 200 mg by mouth Daily.     • apixaban (ELIQUIS) 5 MG tablet tablet Take 1 tablet by mouth Every 12 (Twelve) Hours. 180 tablet 3   • Ascorbic Acid (VITAMIN C) 500 MG capsule Take 500 mg by mouth Daily. 4 caps / day     • Cholecalciferol (VITAMIN D3) 125 MCG (5000 UT) capsule capsule Take 5,000 Units by mouth Daily.     • hydroCHLOROthiazide (HYDRODIURIL) 12.5 MG tablet Take 12.5 mg by mouth Daily.     • imiquimod (ALDARA) 5 % cream Apply SMALL AMOUNT TO TO THE AFFECTED AREA on the nose EVERY NIGHT AT BEDTIME X TWO (2) WEEKS, THEN STOP FOR TWO (2) WEEKS, THEN REPEAT.     • levothyroxine (SYNTHROID, LEVOTHROID) 50 MCG tablet Take 50 mcg by mouth Daily.     • losartan (COZAAR) 100 MG " tablet Take 100 mg by mouth Daily.     • polyethylene glycol (MIRALAX) packet Take 17 g by mouth Daily As Needed.     • vitamin B-12 (CYANOCOBALAMIN) 100 MCG tablet Take 50 mcg by mouth Daily.     • vitamin B-6 (PYRIDOXINE) 100 MG tablet Take 100 mg by mouth Daily.     • Zinc 50 MG tablet Take 50 mg by mouth Daily.     • [DISCONTINUED] dilTIAZem CD (CARDIZEM CD) 240 MG 24 hr capsule Take 1 capsule by mouth Daily. 30 capsule 3   • [DISCONTINUED] omeprazole (priLOSEC) 20 MG capsule Take 20 mg by mouth 2 (two) times a day.     • [DISCONTINUED] ProAir  (90 Base) MCG/ACT inhaler Inhale 2 puffs Every 6 (Six) Hours As Needed.       No facility-administered encounter medications on file as of 5/5/2022.         The following portions of the patient's history were reviewed and updated as appropriate: allergies, current medications, past family history, past medical history, past social history, past surgical history and problem list.    Review of Systems  Complete review of system has been obtained and is negative.  Objective   Physical Exam  Exam conducted with a chaperone present.   Constitutional:       Appearance: Normal appearance.   HENT:      Head: Normocephalic and atraumatic.   Cardiovascular:      Rate and Rhythm: Normal rate.   Pulmonary:      Effort: Pulmonary effort is normal. No respiratory distress.   Abdominal:      General: There is no distension.      Palpations: Abdomen is soft.   Genitourinary:     Comments: On rectal inspection there is some redundant hemorrhoidal tissue that is soft and evidence of external skin tags I believe it was the right anterior location there is a firm fullness to one of the hemorrhoidal columns that feels like a previously thrombosed external hemorrhoid without any necrosis or significant tenderness to palpation there is no evidence of abscess or fistula  Neurological:      General: No focal deficit present.      Mental Status: She is alert. Mental status is at baseline.    Psychiatric:         Mood and Affect: Mood normal.         Behavior: Behavior normal.           Assessment/Plan   Diagnoses and all orders for this visit:    1. External hemorrhoids (Primary)    I believe the diagnosis is a previously thrombosed external hemorrhoid which is taken a while to resolve.  I do not recommend any surgical therapy directed at this at this point.  We talked about the different internal/external hemorrhoids and she wondered why it could not be banded or aspirated.  After answering some of her questions she is agreeable to a follow-up appointment in 4 to 6 weeks if it is still present if it has resolved she can likely cancel that appointment.    Esteban Olivas MD  5/5/2022  1:36 PM EDT    This note was created using Dragon Voice Recognition software.

## 2023-03-20 ENCOUNTER — OFFICE VISIT (OUTPATIENT)
Dept: FAMILY MEDICINE CLINIC | Facility: CLINIC | Age: 83
End: 2023-03-20
Payer: MEDICARE

## 2023-03-20 VITALS
HEIGHT: 66 IN | DIASTOLIC BLOOD PRESSURE: 73 MMHG | HEART RATE: 58 BPM | RESPIRATION RATE: 18 BRPM | TEMPERATURE: 97.5 F | OXYGEN SATURATION: 97 % | WEIGHT: 191 LBS | SYSTOLIC BLOOD PRESSURE: 133 MMHG | BODY MASS INDEX: 30.7 KG/M2

## 2023-03-20 DIAGNOSIS — J43.1 PANLOBULAR EMPHYSEMA: ICD-10-CM

## 2023-03-20 DIAGNOSIS — D17.1 LIPOMA OF TORSO: ICD-10-CM

## 2023-03-20 DIAGNOSIS — I10 PRIMARY HYPERTENSION: Primary | ICD-10-CM

## 2023-03-20 DIAGNOSIS — R73.02 IGT (IMPAIRED GLUCOSE TOLERANCE): ICD-10-CM

## 2023-03-20 DIAGNOSIS — R79.89 ELEVATED FERRITIN: ICD-10-CM

## 2023-03-20 DIAGNOSIS — K21.9 GASTROESOPHAGEAL REFLUX DISEASE WITHOUT ESOPHAGITIS: ICD-10-CM

## 2023-03-20 DIAGNOSIS — K57.90 DIVERTICULOSIS: ICD-10-CM

## 2023-03-20 DIAGNOSIS — E03.9 ACQUIRED HYPOTHYROIDISM: ICD-10-CM

## 2023-03-20 PROBLEM — E83.119 HEMOCHROMATOSIS: Status: ACTIVE | Noted: 2022-11-30

## 2023-03-20 PROBLEM — Z79.01 CHRONIC ANTICOAGULATION: Status: ACTIVE | Noted: 2022-10-03

## 2023-03-20 PROBLEM — I48.19 PERSISTENT ATRIAL FIBRILLATION: Status: ACTIVE | Noted: 2022-03-30

## 2023-03-20 PROBLEM — H52.4 ACCOMMODATIVE INSUFFICIENCY: Status: ACTIVE | Noted: 2022-11-22

## 2023-03-20 PROBLEM — E83.110: Status: ACTIVE | Noted: 2022-11-30

## 2023-03-20 PROBLEM — I48.21 PERMANENT ATRIAL FIBRILLATION (HCC): Status: ACTIVE | Noted: 2022-10-03

## 2023-03-20 PROBLEM — E83.52 SERUM CALCIUM ELEVATED: Status: ACTIVE | Noted: 2023-01-18

## 2023-03-20 PROBLEM — G24.5 BLEPHAROSPASM: Status: ACTIVE | Noted: 2023-01-10

## 2023-03-20 PROCEDURE — 1160F RVW MEDS BY RX/DR IN RCRD: CPT | Performed by: FAMILY MEDICINE

## 2023-03-20 PROCEDURE — 3075F SYST BP GE 130 - 139MM HG: CPT | Performed by: FAMILY MEDICINE

## 2023-03-20 PROCEDURE — 83036 HEMOGLOBIN GLYCOSYLATED A1C: CPT | Performed by: FAMILY MEDICINE

## 2023-03-20 PROCEDURE — 36415 COLL VENOUS BLD VENIPUNCTURE: CPT | Performed by: FAMILY MEDICINE

## 2023-03-20 PROCEDURE — 84443 ASSAY THYROID STIM HORMONE: CPT | Performed by: FAMILY MEDICINE

## 2023-03-20 PROCEDURE — 1159F MED LIST DOCD IN RCRD: CPT | Performed by: FAMILY MEDICINE

## 2023-03-20 PROCEDURE — 3078F DIAST BP <80 MM HG: CPT | Performed by: FAMILY MEDICINE

## 2023-03-20 PROCEDURE — 99203 OFFICE O/P NEW LOW 30 MIN: CPT | Performed by: FAMILY MEDICINE

## 2023-03-20 PROCEDURE — 84439 ASSAY OF FREE THYROXINE: CPT | Performed by: FAMILY MEDICINE

## 2023-03-20 RX ORDER — LOSARTAN POTASSIUM AND HYDROCHLOROTHIAZIDE 12.5; 1 MG/1; MG/1
1 TABLET ORAL DAILY
Qty: 90 TABLET | Refills: 1 | Status: SHIPPED | OUTPATIENT
Start: 2023-03-20 | End: 2023-05-31

## 2023-03-20 RX ORDER — ALBUTEROL SULFATE 90 UG/1
1 AEROSOL, METERED RESPIRATORY (INHALATION) EVERY 4 HOURS PRN
COMMUNITY
Start: 2023-01-26 | End: 2023-05-26

## 2023-03-20 RX ORDER — ACEBUTOLOL HYDROCHLORIDE 200 MG/1
200 CAPSULE ORAL DAILY
Qty: 90 CAPSULE | Refills: 1 | Status: SHIPPED | OUTPATIENT
Start: 2023-03-20

## 2023-03-20 RX ORDER — VITAMIN B COMPLEX
1 CAPSULE ORAL DAILY
Qty: 30 CAPSULE | Refills: 11 | Status: ON HOLD
Start: 2023-03-20 | End: 2023-04-10

## 2023-03-20 RX ORDER — FAMOTIDINE 20 MG/1
20 TABLET, FILM COATED ORAL EVERY OTHER DAY
COMMUNITY
End: 2023-05-08

## 2023-03-20 RX ORDER — OMEPRAZOLE 20 MG/1
20 CAPSULE, DELAYED RELEASE ORAL EVERY OTHER DAY
COMMUNITY
Start: 2023-02-01 | End: 2023-05-09

## 2023-03-20 NOTE — PROGRESS NOTES
Subjective   Kingsley Hunt is a 82 y.o. female.     Chief Complaint   Patient presents with   • Establish Care   • Hypertension   • COPD   • Hyperlipidemia   • Heartburn   • Diabetes         Current Outpatient Medications:   •  acebutolol (SECTRAL) 200 MG capsule, Take 1 capsule by mouth Daily., Disp: 90 capsule, Rfl: 1  •  apixaban (ELIQUIS) 5 MG tablet tablet, Take 1 tablet by mouth Every 12 (Twelve) Hours., Disp: 180 tablet, Rfl: 3  •  famotidine (PEPCID) 20 MG tablet, Take 1 tablet by mouth Every Other Day. Alternate w/ omeprazole, Disp: , Rfl:   •  omeprazole (priLOSEC) 20 MG capsule, Take 1 capsule by mouth Every Other Day. Alternate w/ famotidine, Disp: , Rfl:   •  albuterol sulfate  (90 Base) MCG/ACT inhaler, Inhale 1 puff Every 4 (Four) Hours As Needed for Shortness of Air or Wheezing., Disp: , Rfl:   •  B Complex Vitamins (VITAMIN B COMPLEX PO), Take  by mouth Daily. Take vitamin B6 / B12 liquid as directed, Disp: , Rfl:   •  HYDROcodone-acetaminophen (Norco) 5-325 MG per tablet, Take 1 tablet by mouth Every 6 (Six) Hours As Needed for Moderate Pain, Disp: 15 tablet, Rfl: 0  •  levothyroxine (SYNTHROID, LEVOTHROID) 50 MCG tablet, Take 1 tablet by mouth Daily., Disp: , Rfl:   •  losartan-hydrochlorothiazide (Hyzaar) 100-12.5 MG per tablet, Take 1 tablet by mouth Daily., Disp: 90 tablet, Rfl: 1  •  polyethylene glycol (MiraLax) 17 g packet, Take 17 g by mouth Daily., Disp: , Rfl:   •  sucralfate (CARAFATE) 1 g tablet, Take 1 tablet by mouth 4 (Four) Times a Day Before Meals & at Bedtime., Disp: 120 tablet, Rfl: 2  •  vitamin D3 125 MCG (5000 UT) capsule capsule, Take 1 capsule by mouth Daily., Disp: , Rfl:     Past Medical History:   Diagnosis Date   • Arthritis    • BCC     Nose   • CHOL    • COPD 12/08/2014   • Diabetes mellitus    • Diverticulosis    • GERD    • HTN    • Hypothyroid        Past Surgical History:   Procedure Laterality Date   • APPENDECTOMY     • CATARACT EXTRACTION WITH  INTRAOCULAR LENS IMPLANT Bilateral    • CHOLECYSTECTOMY N/A 2023    Procedure: CHOLECYSTECTOMY LAPAROSCOPIC WITH DAVINCI ROBOT;  Surgeon: Sondra Munoz MD;  Location: [facility] MAIN OR;  Service: Robotics - DaVinci;  Laterality: N/A;   • COLONOSCOPY     • ENDOSCOPY     • SKIN BIOPSY      Nose   • WRIST FRACTURE SURGERY Left        Family History   Problem Relation Age of Onset   • Brain cancer Mother    • COPD Father    • Diabetes Sister    • Heart disease Sister    • Heart disease Brother    • No Known Problems Brother    • Diabetes Son        Social History     Socioeconomic History   • Marital status:    Tobacco Use   • Smoking status: Former     Packs/day: 1.00     Years: 10.00     Pack years: 10.00     Types: Cigarettes     Quit date:      Years since quittin.3     Passive exposure: Past   • Smokeless tobacco: Never   • Tobacco comments:     Quit 30+ years ago   Vaping Use   • Vaping Use: Never used   Substance and Sexual Activity   • Alcohol use: Yes     Alcohol/week: 3.0 standard drinks     Types: 3 Glasses of wine per week     Comment: rare   • Drug use: No   • Sexual activity: Defer       History of Present Illness  81 y/o C female here to [facility] care w/ hx/o HTN/ CHOL/ GERD/ COPD/ DMII      The patient admits to having asthma. She is unsure about COPD. She admits to wheezing in her pharynx.   She is recovering from diverticulitis with inflamed esophagus and stomach. The patient is currently taking MiraLAX for several months and would like to discontinue taking it.    She admits to having basal cell skin cancer on her nose and it is having a Mohs procedure performed on 2023 by Dr. Briseno.     The patient admits to regularly checking her blood glucose levels to maintain her A1c. She was last seen by Dr. Valerio due to her calcium and iron levels elevating. She had 3 rounds of blood work performed and when she returned to Dr. Valerio she was told she was well. She denies taking iron  supplements. Her last A1c was borderline around 7 percent.     She admits to a knot on her shoulder. The patient is concerned that the knot is in relation to her upper extremity and back region.     The patient reports she has had 20 chelation treatments performed by Dr. Wallace.     The patient admits to a left wrist fracture 30 years ago. She denies having her tonsils removed in 2013. She denies having mammograms currently. Her mother was 32 years old when she passed due to a malignant right brain tumor. Her mother had a hysterectomy at an early age. Her father was 82 years old when he passed away. She states her father's kidneys shut down and was on oxygen. The patient had 2 half-brothers with one left alive. The brother who passed away was 60 years old and had COVID-19. Her living brother has no medical problems that she is aware of. The patient quit smoking in 1993 and smoked for 10 years. She admits to rarely drinking. She denies receiving her COVID-19 or influenza vaccination. She admits to taking a water pill with losartan. The patient is currently alternating omeprazole and famotidine, however Dr. Means wants the patient to take omeprazole. She has an appointment with Dr. Means next week.     The following portions of the patient's history were reviewed and updated as appropriate: allergies, current medications, past family history, past medical history, past social history, past surgical history and problem list.    Review of Systems   Constitutional: Negative for activity change, appetite change, fatigue, unexpected weight gain and unexpected weight loss.   Respiratory: Negative for cough, chest tightness and shortness of breath.    Cardiovascular: Negative for chest pain, palpitations and leg swelling.   Gastrointestinal: Positive for constipation. Negative for diarrhea, nausea, vomiting and GERD.   Genitourinary: Negative for frequency, urgency and urinary incontinence.   Musculoskeletal: Negative for  arthralgias and myalgias.   Skin: Positive for skin lesions.   Neurological: Negative for dizziness, facial asymmetry, speech difficulty, weakness, light-headedness, headache, memory problem and confusion.       Vitals:    03/20/23 1004   BP: 133/73   Pulse: 58   Resp: 18   Temp: 97.5 °F (36.4 °C)   SpO2: 97%       Objective   Physical Exam  Vitals and nursing note reviewed.   Constitutional:       General: She is not in acute distress.     Appearance: She is well-developed. She is not ill-appearing or toxic-appearing.   HENT:      Head: Normocephalic and atraumatic.   Cardiovascular:      Rate and Rhythm: Normal rate and regular rhythm.      Heart sounds: Normal heart sounds. No murmur heard.  Pulmonary:      Effort: Pulmonary effort is normal. No respiratory distress.      Breath sounds: Normal breath sounds. No stridor. No wheezing, rhonchi or rales.       Skin:     General: Skin is warm and dry.      Findings: No rash.   Neurological:      Mental Status: She is alert and oriented to person, place, and time.      Cranial Nerves: No cranial nerve deficit.   Psychiatric:         Attention and Perception: Attention and perception normal.         Mood and Affect: Mood normal.         Speech: Speech normal.         Behavior: Behavior normal. Behavior is cooperative.         Thought Content: Thought content normal.         Cognition and Memory: Cognition and memory normal.         Judgment: Judgment normal.           Assessment & Plan   Diagnoses and all orders for this visit:    1. Primary hypertension (Primary)    2. IGT (impaired glucose tolerance)  -     Hemoglobin A1c    3. Acquired hypothyroidism  -     TSH  -     T4, Free    4. Elevated ferritin    5. Diverticulosis    6. Gastroesophageal reflux disease without esophagitis    7. Panlobular emphysema    8. Lipoma of torso    Other orders  -     acebutolol (SECTRAL) 200 MG capsule; Take 1 capsule by mouth Daily.  Dispense: 90 capsule; Refill: 1  -      losartan-hydrochlorothiazide (Hyzaar) 100-12.5 MG per tablet; Take 1 tablet by mouth Daily.  Dispense: 90 tablet; Refill: 1  -     apixaban (ELIQUIS) 5 MG tablet tablet; Take 1 tablet by mouth Every 12 (Twelve) Hours.  Dispense: 180 tablet; Refill: 3  -     Discontinue: b complex vitamins capsule; Take 1 capsule by mouth Daily.  Dispense: 30 capsule; Refill: 11    Reviewed pt hx at length and chart updated  A1c and thyroid labs performed.   Advised patient to take half a cap Miralax every other day.  The patient will return in 4 months.        Transcribed from ambient dictation for Amanda Quinones DO by Yakelin Whitfield.  03/20/23   11:13 EDT    Patient or patient representative verbalized consent to the visit recording.  I have personally performed the services described in this document as transcribed by the above individual, and it is both accurate and complete.

## 2023-03-20 NOTE — PROGRESS NOTES
Venipuncture performed in right arm by Shi Dutton CMA  with good hemostasis. Patient tolerated well. 03/20/23 Shi Dutton CMA

## 2023-03-21 LAB
HBA1C MFR BLD: 6.2 % (ref 4.8–5.6)
T4 FREE SERPL-MCNC: 1.44 NG/DL (ref 0.93–1.7)
TSH SERPL DL<=0.05 MIU/L-ACNC: 2.07 UIU/ML (ref 0.27–4.2)

## 2023-03-26 DIAGNOSIS — R73.02 IGT (IMPAIRED GLUCOSE TOLERANCE): Primary | ICD-10-CM

## 2023-03-26 RX ORDER — LEVOTHYROXINE SODIUM 0.05 MG/1
50 TABLET ORAL NIGHTLY
Qty: 90 TABLET | Refills: 2 | Status: ON HOLD | OUTPATIENT
Start: 2023-03-26

## 2023-04-05 ENCOUNTER — LAB (OUTPATIENT)
Dept: LAB | Facility: HOSPITAL | Age: 83
End: 2023-04-05
Payer: MEDICARE

## 2023-04-05 ENCOUNTER — TRANSCRIBE ORDERS (OUTPATIENT)
Dept: ADMINISTRATIVE | Facility: HOSPITAL | Age: 83
End: 2023-04-05
Payer: MEDICARE

## 2023-04-05 ENCOUNTER — HOSPITAL ENCOUNTER (OUTPATIENT)
Dept: CARDIOLOGY | Facility: HOSPITAL | Age: 83
Discharge: HOME | End: 2023-04-05
Payer: MEDICARE

## 2023-04-05 DIAGNOSIS — Z01.818 PRE-OP TESTING: ICD-10-CM

## 2023-04-05 DIAGNOSIS — R73.02 IGT (IMPAIRED GLUCOSE TOLERANCE): ICD-10-CM

## 2023-04-05 DIAGNOSIS — Z01.818 PRE-OP TESTING: Primary | ICD-10-CM

## 2023-04-05 LAB
HBA1C MFR BLD: 6 % (ref 4.8–5.6)
POTASSIUM SERPL-SCNC: 3.9 MMOL/L (ref 3.5–5.2)

## 2023-04-05 PROCEDURE — 93010 ELECTROCARDIOGRAM REPORT: CPT | Performed by: INTERNAL MEDICINE

## 2023-04-05 PROCEDURE — 36415 COLL VENOUS BLD VENIPUNCTURE: CPT

## 2023-04-05 PROCEDURE — 93005 ELECTROCARDIOGRAM TRACING: CPT | Performed by: SURGERY

## 2023-04-05 PROCEDURE — 83036 HEMOGLOBIN GLYCOSYLATED A1C: CPT

## 2023-04-05 PROCEDURE — 84132 ASSAY OF SERUM POTASSIUM: CPT

## 2023-04-06 ENCOUNTER — TELEPHONE (OUTPATIENT)
Dept: CARDIOLOGY | Facility: CLINIC | Age: 83
End: 2023-04-06

## 2023-04-06 LAB — QT INTERVAL: 388 MS

## 2023-04-06 NOTE — TELEPHONE ENCOUNTER
Caller: Kingsley Hunt    Relationship: Self    Best call back number: [phone]  What is the best time to reach you: ANY    Who are you requesting to speak with (clinical staff, provider,  specific staff member): CLINICAL      What was the call regarding: PT CALLED STATING SHE HAS SX ON HER NOSE ON 4.17.23 AND WANTED TO KNOW HOW MANY DAYS BEFORE HER SX DOES SHE NEED TO STOP HER ELIQUIS.    Do you require a callback: YES

## 2023-04-08 ENCOUNTER — APPOINTMENT (OUTPATIENT)
Dept: ULTRASOUND IMAGING | Facility: HOSPITAL | Age: 83
DRG: 418 | End: 2023-04-08
Payer: MEDICARE

## 2023-04-08 ENCOUNTER — APPOINTMENT (OUTPATIENT)
Dept: CT IMAGING | Facility: HOSPITAL | Age: 83
DRG: 418 | End: 2023-04-08
Payer: MEDICARE

## 2023-04-08 ENCOUNTER — HOSPITAL ENCOUNTER (INPATIENT)
Facility: HOSPITAL | Age: 83
LOS: 4 days | Discharge: HOME | DRG: 418 | End: 2023-04-13
Attending: EMERGENCY MEDICINE | Admitting: EMERGENCY MEDICINE
Payer: MEDICARE

## 2023-04-08 DIAGNOSIS — K81.9 CHOLECYSTITIS: Primary | ICD-10-CM

## 2023-04-08 LAB
ALBUMIN SERPL-MCNC: 4.4 G/DL (ref 3.5–5.2)
ALBUMIN/GLOB SERPL: 1.6 G/DL
ALP SERPL-CCNC: 134 U/L (ref 39–117)
ALT SERPL W P-5'-P-CCNC: 84 U/L (ref 1–33)
ANION GAP SERPL CALCULATED.3IONS-SCNC: 9.5 MMOL/L (ref 5–15)
AST SERPL-CCNC: 166 U/L (ref 1–32)
BASOPHILS # BLD AUTO: 0.01 10*3/MM3 (ref 0–0.2)
BASOPHILS NFR BLD AUTO: 0.1 % (ref 0–1.5)
BILIRUB SERPL-MCNC: 1.7 MG/DL (ref 0–1.2)
BILIRUB UR QL STRIP: NEGATIVE
BUN SERPL-MCNC: 11 MG/DL (ref 8–23)
BUN/CREAT SERPL: 12.5 (ref 7–25)
CALCIUM SPEC-SCNC: 10.3 MG/DL (ref 8.6–10.5)
CHLORIDE SERPL-SCNC: 88 MMOL/L (ref 98–107)
CLARITY UR: CLEAR
CO2 SERPL-SCNC: 30.5 MMOL/L (ref 22–29)
COLOR UR: ABNORMAL
CREAT SERPL-MCNC: 0.88 MG/DL (ref 0.57–1)
D-LACTATE SERPL-SCNC: 2.3 MMOL/L (ref 0.5–2)
D-LACTATE SERPL-SCNC: 2.4 MMOL/L (ref 0.5–2)
DEPRECATED RDW RBC AUTO: 42.3 FL (ref 37–54)
EGFRCR SERPLBLD CKD-EPI 2021: 64.9 ML/MIN/1.73
EOSINOPHIL # BLD AUTO: 0.07 10*3/MM3 (ref 0–0.4)
EOSINOPHIL NFR BLD AUTO: 0.8 % (ref 0.3–6.2)
ERYTHROCYTE [DISTWIDTH] IN BLOOD BY AUTOMATED COUNT: 12.1 % (ref 12.3–15.4)
GLOBULIN UR ELPH-MCNC: 2.7 GM/DL
GLUCOSE SERPL-MCNC: 170 MG/DL (ref 65–99)
GLUCOSE UR STRIP-MCNC: NEGATIVE MG/DL
HCT VFR BLD AUTO: 39.1 % (ref 34–46.6)
HGB BLD-MCNC: 13.7 G/DL (ref 12–15.9)
HGB UR QL STRIP.AUTO: NEGATIVE
HOLD SPECIMEN: NORMAL
IMM GRANULOCYTES # BLD AUTO: 0.01 10*3/MM3 (ref 0–0.05)
IMM GRANULOCYTES NFR BLD AUTO: 0.1 % (ref 0–0.5)
KETONES UR QL STRIP: NEGATIVE
LEUKOCYTE ESTERASE UR QL STRIP.AUTO: NEGATIVE
LIPASE SERPL-CCNC: 76 U/L (ref 13–60)
LYMPHOCYTES # BLD AUTO: 1.18 10*3/MM3 (ref 0.7–3.1)
LYMPHOCYTES NFR BLD AUTO: 14.3 % (ref 19.6–45.3)
MCH RBC QN AUTO: 32.9 PG (ref 26.6–33)
MCHC RBC AUTO-ENTMCNC: 35 G/DL (ref 31.5–35.7)
MCV RBC AUTO: 93.8 FL (ref 79–97)
MONOCYTES # BLD AUTO: 0.19 10*3/MM3 (ref 0.1–0.9)
MONOCYTES NFR BLD AUTO: 2.3 % (ref 5–12)
NEUTROPHILS NFR BLD AUTO: 6.79 10*3/MM3 (ref 1.7–7)
NEUTROPHILS NFR BLD AUTO: 82.4 % (ref 42.7–76)
NITRITE UR QL STRIP: NEGATIVE
PH UR STRIP.AUTO: 8.5 [PH] (ref 5–8)
PLATELET # BLD AUTO: 284 10*3/MM3 (ref 140–450)
PMV BLD AUTO: 9.5 FL (ref 6–12)
POTASSIUM SERPL-SCNC: 4.6 MMOL/L (ref 3.5–5.2)
PROT SERPL-MCNC: 7.1 G/DL (ref 6–8.5)
PROT UR QL STRIP: NEGATIVE
RBC # BLD AUTO: 4.17 10*6/MM3 (ref 3.77–5.28)
SODIUM SERPL-SCNC: 128 MMOL/L (ref 136–145)
SP GR UR STRIP: 1.01 (ref 1–1.03)
UROBILINOGEN UR QL STRIP: ABNORMAL
WBC NRBC COR # BLD: 8.25 10*3/MM3 (ref 3.4–10.8)
WHOLE BLOOD HOLD SPECIMEN: NORMAL

## 2023-04-08 PROCEDURE — 25010000002 ONDANSETRON PER 1 MG: Performed by: EMERGENCY MEDICINE

## 2023-04-08 PROCEDURE — 83690 ASSAY OF LIPASE: CPT

## 2023-04-08 PROCEDURE — 81003 URINALYSIS AUTO W/O SCOPE: CPT

## 2023-04-08 PROCEDURE — 25510000001 IOPAMIDOL PER 1 ML: Performed by: EMERGENCY MEDICINE

## 2023-04-08 PROCEDURE — 83605 ASSAY OF LACTIC ACID: CPT

## 2023-04-08 PROCEDURE — 85025 COMPLETE CBC W/AUTO DIFF WBC: CPT

## 2023-04-08 PROCEDURE — 25010000002 FENTANYL CITRATE (PF) 50 MCG/ML SOLUTION: Performed by: EMERGENCY MEDICINE

## 2023-04-08 PROCEDURE — 74177 CT ABD & PELVIS W/CONTRAST: CPT

## 2023-04-08 PROCEDURE — 76705 ECHO EXAM OF ABDOMEN: CPT

## 2023-04-08 PROCEDURE — 99285 EMERGENCY DEPT VISIT HI MDM: CPT | Performed by: EMERGENCY MEDICINE

## 2023-04-08 PROCEDURE — 36415 COLL VENOUS BLD VENIPUNCTURE: CPT

## 2023-04-08 PROCEDURE — 99285 EMERGENCY DEPT VISIT HI MDM: CPT

## 2023-04-08 PROCEDURE — 80053 COMPREHEN METABOLIC PANEL: CPT

## 2023-04-08 RX ORDER — SODIUM CHLORIDE 0.9 % (FLUSH) 0.9 %
10 SYRINGE (ML) INJECTION AS NEEDED
Status: DISCONTINUED | OUTPATIENT
Start: 2023-04-08 | End: 2023-04-13 | Stop reason: HOSPADM

## 2023-04-08 RX ORDER — FENTANYL CITRATE 50 UG/ML
25 INJECTION, SOLUTION INTRAMUSCULAR; INTRAVENOUS ONCE
Status: COMPLETED | OUTPATIENT
Start: 2023-04-08 | End: 2023-04-08

## 2023-04-08 RX ORDER — ONDANSETRON 2 MG/ML
4 INJECTION INTRAMUSCULAR; INTRAVENOUS ONCE
Status: COMPLETED | OUTPATIENT
Start: 2023-04-08 | End: 2023-04-08

## 2023-04-08 RX ADMIN — IOPAMIDOL 100 ML: 755 INJECTION, SOLUTION INTRAVENOUS at 19:47

## 2023-04-08 RX ADMIN — ONDANSETRON 4 MG: 2 INJECTION INTRAMUSCULAR; INTRAVENOUS at 22:40

## 2023-04-08 RX ADMIN — FENTANYL CITRATE 25 MCG: 50 INJECTION, SOLUTION INTRAMUSCULAR; INTRAVENOUS at 22:41

## 2023-04-08 NOTE — ED NOTES
Pt to ED from home c/o upper abd pain x2hrs and nausea. Pt states she ate around 1400 and pain started a couple hours after. Pt vomited a large amount of undigested food while RN in room. Following vomiting, pt expressed significant improvement in pain and denies nausea. Refused wanting zofran or any medications at this time. Educated pt on plan of care. Pt stated understanding.

## 2023-04-08 NOTE — FSED PROVIDER NOTE
Subjective   History of Present Illness  Patient is an 82-year-old female presenting with abdominal pain.  Patient states that 1 hour prior to arrival she had acute onset epigastric abdominal pain.  She has had nausea and multiple episodes of nonbloody, nonbilious emesis.  She took Tums prior to arrival for the symptoms.  She has a history of an appendectomy.        Review of Systems   Constitutional: Negative for chills and fever.   HENT: Negative for ear pain and rhinorrhea.    Eyes: Negative for photophobia and pain.   Respiratory: Negative for cough and shortness of breath.    Cardiovascular: Negative for chest pain and palpitations.   Gastrointestinal: Positive for abdominal pain, nausea and vomiting. Negative for diarrhea.   Genitourinary: Negative for dysuria and hematuria.   Musculoskeletal: Negative for back pain and neck pain.   Skin: Negative for rash and wound.   Neurological: Negative for seizures and headaches.   Psychiatric/Behavioral: Negative for confusion and hallucinations.   All other systems reviewed and are negative.      Past Medical History:   Diagnosis Date   • Arthritis    • BCC     Nose   • CHOL    • COPD 2014   • GERD    • HTN    • Hypothyroid        No Known Allergies    Past Surgical History:   Procedure Laterality Date   • APPENDECTOMY     • CATARACT EXTRACTION WITH INTRAOCULAR LENS IMPLANT Bilateral    • COLONOSCOPY     • ENDOSCOPY     • SKIN BIOPSY      Nose   • WRIST FRACTURE SURGERY Left        Family History   Problem Relation Age of Onset   • Brain cancer Mother    • COPD Father    • Diabetes Sister    • Heart disease Sister    • Heart disease Brother    • No Known Problems Brother    • Diabetes Son        Social History     Socioeconomic History   • Marital status:    Tobacco Use   • Smoking status: Former     Packs/day: 1.00     Years: 10.00     Pack years: 10.00     Types: Cigarettes     Quit date:      Years since quittin.2     Passive exposure: Past    • Smokeless tobacco: Never   • Tobacco comments:     Quit 30+ years ago   Vaping Use   • Vaping Use: Never used   Substance and Sexual Activity   • Alcohol use: Yes     Alcohol/week: 3.0 standard drinks     Types: 3 Glasses of wine per week     Comment: rare   • Drug use: No   • Sexual activity: Defer           Objective   Physical Exam  Constitutional:       General: She is not in acute distress.  HENT:      Head: Normocephalic and atraumatic.      Nose: Nose normal.      Mouth/Throat:      Mouth: Mucous membranes are moist.      Pharynx: No oropharyngeal exudate.   Eyes:      Extraocular Movements: Extraocular movements intact.   Cardiovascular:      Rate and Rhythm: Normal rate.      Heart sounds: No murmur heard.  Pulmonary:      Effort: No respiratory distress.   Abdominal:      General: Abdomen is flat.      Palpations: Abdomen is soft.      Tenderness: There is no abdominal tenderness. There is no guarding or rebound.   Musculoskeletal:         General: Normal range of motion.      Cervical back: Normal range of motion.   Skin:     General: Skin is warm and dry.   Neurological:      General: No focal deficit present.      Mental Status: She is alert.   Psychiatric:         Mood and Affect: Mood normal.         Procedures  EKG: Irregularly Irregular. Rate 105. Normal axis. Qtc 426. No ST elevation.          ED Course                                           MDM  Patient is an 82-year-old female presenting with abdominal pain.  Patient was seen and examined.  History and physical as noted above.  Vital signs were stable upon examination.  Nursing notes were reviewed and agreed upon.  Given history and physical patient symptoms are nonspecific in nature.  Given her history of abdominal surgery of small bowel obstruction is a consideration.  A gallbladder etiology is also consideration.  Symptoms are consistent with diverticulitis.  Patient has no evidence of peritonitis on examination.  Given the location of  the pain pancreatitis is a consideration as well.  Laboratory work-up and imaging were obtained.  Laboratory work-up notable for a transaminitis and mildly elevated bilirubin.  CT scan showed distended gallbladder and gallstones.  Therefore a ultrasound was obtained.  Ultrasound showed findings concerning for cholecystitis.  Patient was then endorsing worsening epigastric pain.  EKG and troponin were largely unremarkable for any acute process.  Patient was started on Zosyn.  Discussed case with the on-call surgeon who recommended admission to the hospitalist with surgical consultation.  Discussed case with hospitalist who agreed to admit the patient for further management of care.  Patient transferred over to the hospitalist service via EMS in stable condition.   Final diagnoses:   Cholecystitis       ED Disposition  ED Disposition     None          No follow-up provider specified.       Medication List      No changes were made to your prescriptions during this visit.

## 2023-04-09 ENCOUNTER — INPATIENT HOSPITAL (AMBULATORY)
Dept: URBAN - METROPOLITAN AREA HOSPITAL 84 | Facility: HOSPITAL | Age: 83
End: 2023-04-09

## 2023-04-09 DIAGNOSIS — K57.90 DIVERTICULOSIS OF INTESTINE, PART UNSPECIFIED, WITHOUT PERFO: ICD-10-CM

## 2023-04-09 DIAGNOSIS — D64.9 ANEMIA, UNSPECIFIED: ICD-10-CM

## 2023-04-09 DIAGNOSIS — E87.1 HYPO-OSMOLALITY AND HYPONATREMIA: ICD-10-CM

## 2023-04-09 DIAGNOSIS — R94.5 ABNORMAL RESULTS OF LIVER FUNCTION STUDIES: ICD-10-CM

## 2023-04-09 DIAGNOSIS — Z79.01 LONG TERM (CURRENT) USE OF ANTICOAGULANTS: ICD-10-CM

## 2023-04-09 DIAGNOSIS — K59.00 CONSTIPATION, UNSPECIFIED: ICD-10-CM

## 2023-04-09 DIAGNOSIS — R11.2 NAUSEA WITH VOMITING, UNSPECIFIED: ICD-10-CM

## 2023-04-09 DIAGNOSIS — R10.9 UNSPECIFIED ABDOMINAL PAIN: ICD-10-CM

## 2023-04-09 DIAGNOSIS — K21.9 GASTRO-ESOPHAGEAL REFLUX DISEASE WITHOUT ESOPHAGITIS: ICD-10-CM

## 2023-04-09 DIAGNOSIS — D72.829 ELEVATED WHITE BLOOD CELL COUNT, UNSPECIFIED: ICD-10-CM

## 2023-04-09 PROBLEM — K81.9 CHOLECYSTITIS: Status: ACTIVE | Noted: 2023-04-09

## 2023-04-09 LAB
ALBUMIN SERPL-MCNC: 3.7 G/DL (ref 3.5–5.2)
ALBUMIN/GLOB SERPL: 1.5 G/DL
ALP SERPL-CCNC: 174 U/L (ref 39–117)
ALPHA1 GLOB MFR UR ELPH: 139 MG/DL (ref 90–200)
ALT SERPL W P-5'-P-CCNC: 774 U/L (ref 1–33)
ANION GAP SERPL CALCULATED.3IONS-SCNC: 10 MMOL/L (ref 5–15)
APAP SERPL-MCNC: <5 MCG/ML (ref 0–30)
AST SERPL-CCNC: 829 U/L (ref 1–32)
BASOPHILS # BLD AUTO: 0 10*3/MM3 (ref 0–0.2)
BASOPHILS NFR BLD AUTO: 0.2 % (ref 0–1.5)
BILIRUB SERPL-MCNC: 3.6 MG/DL (ref 0–1.2)
BUN SERPL-MCNC: 12 MG/DL (ref 8–23)
BUN/CREAT SERPL: 12.5 (ref 7–25)
CALCIUM SPEC-SCNC: 9.2 MG/DL (ref 8.6–10.5)
CERULOPLASMIN SERPL-MCNC: 19 MG/DL (ref 19–39)
CHLORIDE SERPL-SCNC: 94 MMOL/L (ref 98–107)
CO2 SERPL-SCNC: 29 MMOL/L (ref 22–29)
CREAT SERPL-MCNC: 0.96 MG/DL (ref 0.57–1)
DEPRECATED RDW RBC AUTO: 46.8 FL (ref 37–54)
EGFRCR SERPLBLD CKD-EPI 2021: 58.5 ML/MIN/1.73
EOSINOPHIL # BLD AUTO: 0.1 10*3/MM3 (ref 0–0.4)
EOSINOPHIL NFR BLD AUTO: 0.6 % (ref 0.3–6.2)
ERYTHROCYTE [DISTWIDTH] IN BLOOD BY AUTOMATED COUNT: 13.4 % (ref 12.3–15.4)
FERRITIN SERPL-MCNC: ABNORMAL NG/ML (ref 13–150)
GEN 5 2HR TROPONIN T REFLEX: 14 NG/L
GGT SERPL-CCNC: 701 U/L (ref 5–36)
GLOBULIN UR ELPH-MCNC: 2.4 GM/DL
GLUCOSE SERPL-MCNC: 161 MG/DL (ref 65–99)
HAV IGM SERPL QL IA: NORMAL
HBV CORE IGM SERPL QL IA: NORMAL
HBV SURFACE AG SERPL QL IA: NORMAL
HCT VFR BLD AUTO: 34.4 % (ref 34–46.6)
HCV AB SER DONR QL: NORMAL
HGB BLD-MCNC: 11.9 G/DL (ref 12–15.9)
IRON 24H UR-MRATE: 39 MCG/DL (ref 37–145)
LYMPHOCYTES # BLD AUTO: 0.8 10*3/MM3 (ref 0.7–3.1)
LYMPHOCYTES NFR BLD AUTO: 6.4 % (ref 19.6–45.3)
MAGNESIUM SERPL-MCNC: 1.8 MG/DL (ref 1.6–2.4)
MCH RBC QN AUTO: 33 PG (ref 26.6–33)
MCHC RBC AUTO-ENTMCNC: 34.6 G/DL (ref 31.5–35.7)
MCV RBC AUTO: 95.2 FL (ref 79–97)
MONOCYTES # BLD AUTO: 0.7 10*3/MM3 (ref 0.1–0.9)
MONOCYTES NFR BLD AUTO: 5.7 % (ref 5–12)
NEUTROPHILS NFR BLD AUTO: 10.7 10*3/MM3 (ref 1.7–7)
NEUTROPHILS NFR BLD AUTO: 87.1 % (ref 42.7–76)
NRBC BLD AUTO-RTO: 0 /100 WBC (ref 0–0.2)
PHOSPHATE SERPL-MCNC: 4.4 MG/DL (ref 2.5–4.5)
PLATELET # BLD AUTO: 235 10*3/MM3 (ref 140–450)
PMV BLD AUTO: 7.9 FL (ref 6–12)
POTASSIUM SERPL-SCNC: 3.5 MMOL/L (ref 3.5–5.2)
PROT SERPL-MCNC: 6.1 G/DL (ref 6–8.5)
QT INTERVAL: 322 MS
RBC # BLD AUTO: 3.61 10*6/MM3 (ref 3.77–5.28)
SODIUM SERPL-SCNC: 133 MMOL/L (ref 136–145)
TROPONIN T DELTA: 0 NG/L
TROPONIN T SERPL HS-MCNC: 14 NG/L
WBC NRBC COR # BLD: 12.2 10*3/MM3 (ref 3.4–10.8)

## 2023-04-09 PROCEDURE — 82977 ASSAY OF GGT: CPT | Performed by: NURSE PRACTITIONER

## 2023-04-09 PROCEDURE — 80074 ACUTE HEPATITIS PANEL: CPT | Performed by: NURSE PRACTITIONER

## 2023-04-09 PROCEDURE — 80143 DRUG ASSAY ACETAMINOPHEN: CPT | Performed by: NURSE PRACTITIONER

## 2023-04-09 PROCEDURE — 85652 RBC SED RATE AUTOMATED: CPT | Performed by: INTERNAL MEDICINE

## 2023-04-09 PROCEDURE — 84484 ASSAY OF TROPONIN QUANT: CPT | Performed by: EMERGENCY MEDICINE

## 2023-04-09 PROCEDURE — 99222 1ST HOSP IP/OBS MODERATE 55: CPT | Performed by: SURGERY

## 2023-04-09 PROCEDURE — 84100 ASSAY OF PHOSPHORUS: CPT | Performed by: INTERNAL MEDICINE

## 2023-04-09 PROCEDURE — 86140 C-REACTIVE PROTEIN: CPT | Performed by: INTERNAL MEDICINE

## 2023-04-09 PROCEDURE — 84550 ASSAY OF BLOOD/URIC ACID: CPT | Performed by: INTERNAL MEDICINE

## 2023-04-09 PROCEDURE — 25010000002 MORPHINE PER 10 MG: Performed by: NURSE PRACTITIONER

## 2023-04-09 PROCEDURE — 82390 ASSAY OF CERULOPLASMIN: CPT | Performed by: NURSE PRACTITIONER

## 2023-04-09 PROCEDURE — 80053 COMPREHEN METABOLIC PANEL: CPT | Performed by: INTERNAL MEDICINE

## 2023-04-09 PROCEDURE — 83880 ASSAY OF NATRIURETIC PEPTIDE: CPT | Performed by: INTERNAL MEDICINE

## 2023-04-09 PROCEDURE — 86376 MICROSOMAL ANTIBODY EACH: CPT | Performed by: NURSE PRACTITIONER

## 2023-04-09 PROCEDURE — 93005 ELECTROCARDIOGRAM TRACING: CPT | Performed by: EMERGENCY MEDICINE

## 2023-04-09 PROCEDURE — 86015 ACTIN ANTIBODY EACH: CPT | Performed by: NURSE PRACTITIONER

## 2023-04-09 PROCEDURE — 86038 ANTINUCLEAR ANTIBODIES: CPT | Performed by: NURSE PRACTITIONER

## 2023-04-09 PROCEDURE — 25010000002 PIPERACILLIN SOD-TAZOBACTAM PER 1 G: Performed by: EMERGENCY MEDICINE

## 2023-04-09 PROCEDURE — 83735 ASSAY OF MAGNESIUM: CPT | Performed by: INTERNAL MEDICINE

## 2023-04-09 PROCEDURE — 86381 MITOCHONDRIAL ANTIBODY EACH: CPT | Performed by: NURSE PRACTITIONER

## 2023-04-09 PROCEDURE — 25010000002 ONDANSETRON PER 1 MG: Performed by: NURSE PRACTITIONER

## 2023-04-09 PROCEDURE — 83540 ASSAY OF IRON: CPT | Performed by: NURSE PRACTITIONER

## 2023-04-09 PROCEDURE — 25010000002 PIPERACILLIN SOD-TAZOBACTAM PER 1 G: Performed by: NURSE PRACTITIONER

## 2023-04-09 PROCEDURE — 85025 COMPLETE CBC W/AUTO DIFF WBC: CPT | Performed by: INTERNAL MEDICINE

## 2023-04-09 PROCEDURE — 82728 ASSAY OF FERRITIN: CPT | Performed by: NURSE PRACTITIONER

## 2023-04-09 PROCEDURE — 99222 1ST HOSP IP/OBS MODERATE 55: CPT | Mod: FS | Performed by: NURSE PRACTITIONER

## 2023-04-09 PROCEDURE — 82105 ALPHA-FETOPROTEIN SERUM: CPT | Performed by: NURSE PRACTITIONER

## 2023-04-09 PROCEDURE — 25010000002 FENTANYL CITRATE (PF) 50 MCG/ML SOLUTION: Performed by: EMERGENCY MEDICINE

## 2023-04-09 PROCEDURE — 82103 ALPHA-1-ANTITRYPSIN TOTAL: CPT | Performed by: NURSE PRACTITIONER

## 2023-04-09 PROCEDURE — 87040 BLOOD CULTURE FOR BACTERIA: CPT | Performed by: EMERGENCY MEDICINE

## 2023-04-09 RX ORDER — MAGNESIUM SULFATE HEPTAHYDRATE 40 MG/ML
4 INJECTION, SOLUTION INTRAVENOUS AS NEEDED
Status: DISCONTINUED | OUTPATIENT
Start: 2023-04-09 | End: 2023-04-13 | Stop reason: HOSPADM

## 2023-04-09 RX ORDER — SUCRALFATE 1 G/1
1 TABLET ORAL
Status: DISCONTINUED | OUTPATIENT
Start: 2023-04-09 | End: 2023-04-13 | Stop reason: HOSPADM

## 2023-04-09 RX ORDER — ALBUTEROL SULFATE 2.5 MG/3ML
2.5 SOLUTION RESPIRATORY (INHALATION) EVERY 4 HOURS PRN
Status: DISCONTINUED | OUTPATIENT
Start: 2023-04-09 | End: 2023-04-13 | Stop reason: HOSPADM

## 2023-04-09 RX ORDER — LEVOTHYROXINE SODIUM 0.05 MG/1
50 TABLET ORAL NIGHTLY
Status: DISCONTINUED | OUTPATIENT
Start: 2023-04-09 | End: 2023-04-13 | Stop reason: HOSPADM

## 2023-04-09 RX ORDER — PANTOPRAZOLE SODIUM 40 MG/10ML
40 INJECTION, POWDER, LYOPHILIZED, FOR SOLUTION INTRAVENOUS
Status: DISCONTINUED | OUTPATIENT
Start: 2023-04-10 | End: 2023-04-13

## 2023-04-09 RX ORDER — POTASSIUM CHLORIDE 20 MEQ/1
40 TABLET, EXTENDED RELEASE ORAL AS NEEDED
Status: DISCONTINUED | OUTPATIENT
Start: 2023-04-09 | End: 2023-04-13 | Stop reason: HOSPADM

## 2023-04-09 RX ORDER — MAGNESIUM SULFATE HEPTAHYDRATE 40 MG/ML
2 INJECTION, SOLUTION INTRAVENOUS AS NEEDED
Status: DISCONTINUED | OUTPATIENT
Start: 2023-04-09 | End: 2023-04-13 | Stop reason: HOSPADM

## 2023-04-09 RX ORDER — LOSARTAN POTASSIUM 50 MG/1
100 TABLET ORAL
Status: DISCONTINUED | OUTPATIENT
Start: 2023-04-09 | End: 2023-04-13 | Stop reason: HOSPADM

## 2023-04-09 RX ORDER — SODIUM CHLORIDE 9 MG/ML
75 INJECTION, SOLUTION INTRAVENOUS CONTINUOUS
Status: DISPENSED | OUTPATIENT
Start: 2023-04-09 | End: 2023-04-09

## 2023-04-09 RX ORDER — PANTOPRAZOLE SODIUM 40 MG/1
40 TABLET, DELAYED RELEASE ORAL
Status: DISCONTINUED | OUTPATIENT
Start: 2023-04-09 | End: 2023-04-09

## 2023-04-09 RX ORDER — POLYETHYLENE GLYCOL 3350 17 G/17G
17 POWDER, FOR SOLUTION ORAL DAILY
Status: DISCONTINUED | OUTPATIENT
Start: 2023-04-09 | End: 2023-04-09

## 2023-04-09 RX ORDER — HYDROCHLOROTHIAZIDE 12.5 MG/1
12.5 TABLET ORAL
Status: DISCONTINUED | OUTPATIENT
Start: 2023-04-09 | End: 2023-04-10

## 2023-04-09 RX ORDER — POTASSIUM CHLORIDE 1.5 G/1.77G
40 POWDER, FOR SOLUTION ORAL AS NEEDED
Status: DISCONTINUED | OUTPATIENT
Start: 2023-04-09 | End: 2023-04-13 | Stop reason: HOSPADM

## 2023-04-09 RX ORDER — ONDANSETRON 2 MG/ML
4 INJECTION INTRAMUSCULAR; INTRAVENOUS EVERY 6 HOURS PRN
Status: DISCONTINUED | OUTPATIENT
Start: 2023-04-09 | End: 2023-04-13 | Stop reason: HOSPADM

## 2023-04-09 RX ORDER — POLYETHYLENE GLYCOL 3350 17 G/17G
17 POWDER, FOR SOLUTION ORAL DAILY
COMMUNITY

## 2023-04-09 RX ORDER — FENTANYL CITRATE 50 UG/ML
50 INJECTION, SOLUTION INTRAMUSCULAR; INTRAVENOUS ONCE
Status: COMPLETED | OUTPATIENT
Start: 2023-04-09 | End: 2023-04-09

## 2023-04-09 RX ORDER — CALCIUM CARBONATE 200(500)MG
1 TABLET,CHEWABLE ORAL DAILY
Status: ON HOLD | COMMUNITY
End: 2023-04-10

## 2023-04-09 RX ORDER — POLYETHYLENE GLYCOL 3350 17 G/17G
17 POWDER, FOR SOLUTION ORAL 2 TIMES DAILY
Status: DISCONTINUED | OUTPATIENT
Start: 2023-04-09 | End: 2023-04-13 | Stop reason: HOSPADM

## 2023-04-09 RX ADMIN — ONDANSETRON 4 MG: 2 INJECTION INTRAMUSCULAR; INTRAVENOUS at 08:02

## 2023-04-09 RX ADMIN — POLYETHYLENE GLYCOL 3350 17 G: 17 POWDER, FOR SOLUTION ORAL at 08:03

## 2023-04-09 RX ADMIN — HYDROCHLOROTHIAZIDE 12.5 MG: 12.5 TABLET ORAL at 08:03

## 2023-04-09 RX ADMIN — LEVOTHYROXINE SODIUM 50 MCG: 0.05 TABLET ORAL at 22:10

## 2023-04-09 RX ADMIN — MORPHINE SULFATE 4 MG: 4 INJECTION, SOLUTION INTRAMUSCULAR; INTRAVENOUS at 08:03

## 2023-04-09 RX ADMIN — PIPERACILLIN SODIUM AND TAZOBACTAM SODIUM 3.38 G: 3; .375 INJECTION, POWDER, LYOPHILIZED, FOR SOLUTION INTRAVENOUS at 18:02

## 2023-04-09 RX ADMIN — LOSARTAN POTASSIUM 100 MG: 50 TABLET, FILM COATED ORAL at 08:03

## 2023-04-09 RX ADMIN — Medication 10 ML: at 05:41

## 2023-04-09 RX ADMIN — SODIUM CHLORIDE 75 ML/HR: 9 INJECTION, SOLUTION INTRAVENOUS at 05:40

## 2023-04-09 RX ADMIN — PIPERACILLIN AND TAZOBACTAM 3.38 G: 3; .375 INJECTION, POWDER, LYOPHILIZED, FOR SOLUTION INTRAVENOUS at 01:34

## 2023-04-09 RX ADMIN — MORPHINE SULFATE 4 MG: 4 INJECTION, SOLUTION INTRAMUSCULAR; INTRAVENOUS at 17:54

## 2023-04-09 RX ADMIN — ONDANSETRON 4 MG: 2 INJECTION INTRAMUSCULAR; INTRAVENOUS at 22:11

## 2023-04-09 RX ADMIN — POLYETHYLENE GLYCOL 3350 17 G: 17 POWDER, FOR SOLUTION ORAL at 22:10

## 2023-04-09 RX ADMIN — SUCRALFATE 1 G: 1 TABLET ORAL at 17:56

## 2023-04-09 RX ADMIN — PANTOPRAZOLE SODIUM 40 MG: 40 TABLET, DELAYED RELEASE ORAL at 05:40

## 2023-04-09 RX ADMIN — FENTANYL CITRATE 50 MCG: 50 INJECTION, SOLUTION INTRAMUSCULAR; INTRAVENOUS at 00:22

## 2023-04-09 RX ADMIN — SUCRALFATE 1 G: 1 TABLET ORAL at 22:10

## 2023-04-09 RX ADMIN — PIPERACILLIN SODIUM AND TAZOBACTAM SODIUM 3.38 G: 3; .375 INJECTION, POWDER, LYOPHILIZED, FOR SOLUTION INTRAVENOUS at 08:02

## 2023-04-09 RX ADMIN — Medication 5000 UNITS: at 08:03

## 2023-04-09 NOTE — H&P
Formerly [facility]     Patient Name:  Kingsley Hunt  YOB: 1940  MRN:  [id number]  Admit Date:  4/8/2023  Patient Care Team:  Amanda Quinones DO as PCP - General (Family Medicine)  Marbin Lawrence MD as Consulting Physician (Cardiac Electrophysiology)  Dara Means MD as Consulting Physician (Gastroenterology)  Jake Glynn OD (Optometry)      Subjective   History Present Illness     Chief Complaint   Patient presents with   • Abdominal Pain   • Vomiting     After eating felt like food is stuck at top of stomach, took omeprazole and baking soda, having severe pain in abd now       History of Present Illness Ms. Hunt is a 82 y.o. female with a history of atrial fibrillation on Eliquis, diverticulosis, GERD, HTN, HLD, hypothyroidism, COPD, that presents to [facility] for free standing emergency room at [facility] complaining of abrupt abdominal pain, after vomiting this afternoon.  She states the pain was over the entire abdominal area, it did not radiate, it was severe in nature, and  felt like death, confirms associated nausea and vomiting,  She denies any recent illness, block tarry or bloody stools or other associated symptoms.  She is on chronic Eliquis for atrial fibrillation, with her last dose taken Saturday evening.  She currently denies any abdominal pain, she states her pain has been well controlled since being at the standing emergency department.  She denies any other acute distress chest pain, palpitations, shortness of breath, lightheadedness, dizziness, fever, chills, cough, recent known sick contacts, recent travel, lower extremity edema, diarrhea, or  complaints.    Initial evaluation at freestanding emergency department  Vital signs 138/81, heart rate 90, respiratory rate 21, oxygen saturation 99% on room air, she is afebrile with a Tmax of 98.7  Initial lactate is 2.3 with a recheck of 2.4, slightly elevated  lipase of 76, hyponatremia sodium 128, per glycemia glucose 170, transaminase ALT is 84, AST is 166, total bilirubin 1.7, alkaline phosphatase 134.  Her white count is normal at 8.25, with 82.4% neutrophils,  CT scan of the abdomen pelvis with contrast shows cholelithiasis with distended gallbladder hepatomegaly, with hepatic steatosis, diverticulosis, a small hiatal hernia.  EKG atrial fibrillation with heart rate of 105  Abdominal ultrasound with gallbladder is distended gallbladder, cholelithiasis, lobular echogenic material in the gallbladder lumen without flow most compatible with sludge, and fatty liver.  She was administered a total of 75 mics of fentanyl, 4 mg of ondansetron 3.375 mg of Zosyn while at freestanding emergency department.  ED provider consulted general surgery who is reported to have agreed to consult and follow-up.    Patient will be admitted to the hospitalist group for further evaluation and treatment of cholelithiasis    Review of Systems   HENT: Negative.    Eyes: Negative.    Respiratory: Negative.    Cardiovascular: Negative.    Gastrointestinal: Positive for abdominal pain, constipation, nausea and vomiting.   Endocrine: Negative.    Genitourinary: Negative.    Musculoskeletal: Negative.    Skin: Negative.    Allergic/Immunologic: Negative.    Neurological: Negative.    Hematological: Negative.    Psychiatric/Behavioral: Negative.    All other systems reviewed and are negative.       Personal History     Past Medical History:   Diagnosis Date   • Arthritis    • BCC     Nose   • CHOL    • COPD 12/08/2014   • Diverticulosis    • GERD    • HTN    • Hypothyroid      Past Surgical History:   Procedure Laterality Date   • APPENDECTOMY     • CATARACT EXTRACTION WITH INTRAOCULAR LENS IMPLANT Bilateral    • COLONOSCOPY     • ENDOSCOPY     • SKIN BIOPSY      Nose   • WRIST FRACTURE SURGERY Left 2003     Family History   Problem Relation Age of Onset   • Brain cancer Mother    • COPD Father    •  Diabetes Sister    • Heart disease Sister    • Heart disease Brother    • No Known Problems Brother    • Diabetes Son      Social History     Tobacco Use   • Smoking status: Former     Packs/day: 1.00     Years: 10.00     Pack years: 10.00     Types: Cigarettes     Quit date:      Years since quittin.2     Passive exposure: Past   • Smokeless tobacco: Never   • Tobacco comments:     Quit 30+ years ago   Vaping Use   • Vaping Use: Never used   Substance Use Topics   • Alcohol use: Yes     Alcohol/week: 3.0 standard drinks     Types: 3 Glasses of wine per week     Comment: rare   • Drug use: No     No current facility-administered medications on file prior to encounter.     Current Outpatient Medications on File Prior to Encounter   Medication Sig Dispense Refill   • acebutolol (SECTRAL) 200 MG capsule Take 1 capsule by mouth Daily. 90 capsule 1   • apixaban (ELIQUIS) 5 MG tablet tablet Take 1 tablet by mouth Every 12 (Twelve) Hours. 180 tablet 3   • b complex vitamins capsule Take 1 capsule by mouth Daily. 30 capsule 11   • calcium carbonate (TUMS) 500 MG chewable tablet Chew 1 tablet Daily.     • famotidine (PEPCID) 20 MG tablet Take 1 tablet by mouth. Every other day     • levothyroxine (SYNTHROID, LEVOTHROID) 50 MCG tablet Take 1 tablet by mouth Every Night. 90 tablet 2   • losartan-hydrochlorothiazide (Hyzaar) 100-12.5 MG per tablet Take 1 tablet by mouth Daily. 90 tablet 1   • omeprazole (priLOSEC) 20 MG capsule Take 1 by mouth every other day     • polyethylene glycol (MiraLax) 17 g packet Take 17 g by mouth Daily.     • Accu-Chek Guide test strip test BLOOD SUGAR AT least TWICE A WEEK     • albuterol sulfate  (90 Base) MCG/ACT inhaler INHALE ONE (1) PUFF EVERY 4 HOURS AS NEEDED     • vitamin D3 125 MCG (5000 UT) capsule capsule Take 1 capsule by mouth Daily.       No Known Allergies    Objective    Objective     Vital Signs  Temp:  [97.6 °F (36.4 °C)-98.8 °F (37.1 °C)] 98.3 °F (36.8 °C)  Heart  Rate:  [] 78  Resp:  [16-21] 16  BP: ()/(50-81) 97/51  SpO2:  [95 %-99 %] 97 %  on   ;   Device (Oxygen Therapy): room air  Body mass index is 29.7 kg/m².    Physical Exam  Vitals and nursing note reviewed.   Constitutional:       Appearance: Normal appearance.   HENT:      Head: Atraumatic.      Mouth/Throat:      Mouth: Mucous membranes are dry.   Eyes:      Conjunctiva/sclera: Conjunctivae normal.   Cardiovascular:      Rate and Rhythm: Normal rate. Rhythm irregular.      Pulses: Normal pulses.           Radial pulses are 2+ on the right side and 2+ on the left side.      Heart sounds: Normal heart sounds.   Pulmonary:      Effort: Pulmonary effort is normal.      Breath sounds: Normal breath sounds.   Abdominal:      General: Bowel sounds are normal.      Palpations: Abdomen is soft.      Tenderness: There is guarding.   Musculoskeletal:      Right lower leg: No edema.      Left lower leg: No edema.   Skin:     General: Skin is warm and dry.      Capillary Refill: Capillary refill takes less than 2 seconds.   Neurological:      General: No focal deficit present.      Mental Status: She is alert and oriented to person, place, and time.   Psychiatric:         Mood and Affect: Mood normal.         Results Review:  I reviewed the patient's new clinical results.  I reviewed the patient's new imaging results and agree with the interpretation.  I reviewed the patient's other test results and agree with the interpretation  I personally viewed and interpreted the patient's EKG/Telemetry data  Discussed with ED provider.    Lab Results (last 24 hours)     Procedure Component Value Units Date/Time    CBC & Differential [[id number]]  (Abnormal) Collected: 04/08/23 1856    Specimen: Blood Updated: 04/08/23 1900    Narrative:      The following orders were created for panel order CBC & Differential.  Procedure                               Abnormality         Status                     ---------                                -----------         ------                     CBC Auto Differential[[id number]]        Abnormal            Final result                 Please view results for these tests on the individual orders.    Comprehensive Metabolic Panel [[id number]]  (Abnormal) Collected: 04/08/23 1856    Specimen: Blood Updated: 04/08/23 1936     Glucose 170 mg/dL      BUN 11 mg/dL      Creatinine 0.88 mg/dL      Sodium 128 mmol/L      Potassium 4.6 mmol/L      Comment: Slight hemolysis detected by analyzer. Results may be affected.        Chloride 88 mmol/L      CO2 30.5 mmol/L      Calcium 10.3 mg/dL      Total Protein 7.1 g/dL      Albumin 4.4 g/dL      ALT (SGPT) 84 U/L      AST (SGOT) 166 U/L      Comment: Slight hemolysis detected by analyzer. Results may be affected.        Alkaline Phosphatase 134 U/L      Total Bilirubin 1.7 mg/dL      Globulin 2.7 gm/dL      A/G Ratio 1.6 g/dL      BUN/Creatinine Ratio 12.5     Anion Gap 9.5 mmol/L      eGFR 64.9 mL/min/1.73     Narrative:      GFR Normal >60  Chronic Kidney Disease <60  Kidney Failure <15    The GFR formula is only valid for adults with stable renal function between ages 18 and 70.    Lipase [[id number]]  (Abnormal) Collected: 04/08/23 1856    Specimen: Blood Updated: 04/08/23 1922     Lipase 76 U/L     Lactic Acid, Plasma [[id number]]  (Abnormal) Collected: 04/08/23 1856    Specimen: Blood Updated: 04/08/23 1927     Lactate 2.3 mmol/L     CBC Auto Differential [[id number]]  (Abnormal) Collected: 04/08/23 1856    Specimen: Blood Updated: 04/08/23 1900     WBC 8.25 10*3/mm3      RBC 4.17 10*6/mm3      Hemoglobin 13.7 g/dL      Hematocrit 39.1 %      MCV 93.8 fL      MCH 32.9 pg      MCHC 35.0 g/dL      RDW 12.1 %      RDW-SD 42.3 fl      MPV 9.5 fL      Platelets 284 10*3/mm3      Neutrophil % 82.4 %      Lymphocyte % 14.3 %      Monocyte % 2.3 %      Eosinophil % 0.8 %      Basophil % 0.1 %      Immature Grans % 0.1 %      Neutrophils, Absolute 6.79 10*3/mm3       Lymphocytes, Absolute 1.18 10*3/mm3      Monocytes, Absolute 0.19 10*3/mm3      Eosinophils, Absolute 0.07 10*3/mm3      Basophils, Absolute 0.01 10*3/mm3      Immature Grans, Absolute 0.01 10*3/mm3     STAT Lactic Acid, Reflex [[id number]]  (Abnormal) Collected: 04/08/23 2324    Specimen: Blood Updated: 04/08/23 2345     Lactate 2.4 mmol/L     Urinalysis With Microscopic If Indicated (No Culture) - Urine, Clean Catch [[id number]]  (Abnormal) Collected: 04/08/23 2328    Specimen: Urine, Clean Catch Updated: 04/08/23 2331     Color, UA Dark Yellow     Appearance, UA Clear     pH, UA 8.5     Specific Gravity, UA 1.010     Glucose, UA Negative     Ketones, UA Negative     Bilirubin, UA Negative     Blood, UA Negative     Protein, UA Negative     Leuk Esterase, UA Negative     Nitrite, UA Negative     Urobilinogen, UA 0.2 E.U./dL    Narrative:      Urine microscopic not indicated.    High Sensitivity Troponin T [[id number]]  (Abnormal) Collected: 04/09/23 0026    Specimen: Blood Updated: 04/09/23 0053     HS Troponin T 14 ng/L     Narrative:      High Sensitive Troponin T Reference Range:  <10.0 ng/L- Negative Female for AMI  <15.0 ng/L- Negative Male for AMI  >=10 - Abnormal Female indicating possible myocardial injury.  >=15 - Abnormal Male indicating possible myocardial injury.   Clinicians would have to utilize clinical acumen, EKG, Troponin, and serial changes to determine if it is an Acute Myocardial Infarction or myocardial injury due to an underlying chronic condition.         Blood Culture - Blood, Arm, Right [[id number]] Collected: 04/09/23 0120    Specimen: Blood from Arm, Right Updated: 04/09/23 0121    Blood Culture - Blood, Arm, Left [[id number]] Collected: 04/09/23 0120    Specimen: Blood from Arm, Left Updated: 04/09/23 0120    High Sensitivity Troponin T 2Hr [[id number]]  (Abnormal) Collected: 04/09/23 0121    Specimen: Blood Updated: 04/09/23 0139     HS Troponin T 14 ng/L      Troponin T Delta 0 ng/L      Narrative:      High Sensitive Troponin T Reference Range:  <10.0 ng/L- Negative Female for AMI  <15.0 ng/L- Negative Male for AMI  >=10 - Abnormal Female indicating possible myocardial injury.  >=15 - Abnormal Male indicating possible myocardial injury.   Clinicians would have to utilize clinical acumen, EKG, Troponin, and serial changes to determine if it is an Acute Myocardial Infarction or myocardial injury due to an underlying chronic condition.               Imaging Results (Last 24 Hours)     Procedure Component Value Units Date/Time    US Gallbladder [[id number]] Collected: 04/08/23 2034     Updated: 04/09/23 0039    Narrative:      EXAM:  US GALLBLADDER     DATE: 4/8/2023 9:37 PM    HISTORY:  Pain, vomiting    COMPARISON:  None.    TECHNIQUE:  Grayscale images.      FINDINGS:  LIVER: Echogenic.  No focal mass or intrahepatic biliary ductal dilatation.  Patent portal vein with normal directional flow.    GALLBLADDER: Distended.  Gallstones. Echogenic dependent material in the gallbladder. There is no color flow. Spectral images demonstrate some background noise, without convincing flow.  No gallbladder wall thickening or pericholecystic fluid.     COMMON BILE DUCT: Measures 6 mm    PANCREAS: Unremarkable where visualized.    ASCITES: None.     RIGHT KIDNEY:  Measures 12.6 cm.   Normal contour and sonographic appearance and echotexture without hydronephrosis.        Impression:        1.  Distended gallbladder. Cholelithiasis. Correlate for cholecystitis.  2.  Lobular echogenic material in the gallbladder lumen without flow, most compatible with sludge.  3.  Fatty liver    Electronically signed by:  Shantell Murillo M.D.    4/8/2023 10:38 PM Mountain Time    CT Abdomen Pelvis With Contrast [[id number]] Collected: 04/08/23 1830     Updated: 04/08/23 2042    Narrative:      Exam: CT abdomen and pelvis with contrast    Date: April 8, 2023    History: Abdominal pain    Comparison: November 12, 2019    Technique:  Contiguous axial CT images were obtained of the abdomen and pelvis following the uneventful intravenous administration of 100 mL Isovue-370 contrast. Additionally, sagittal and coronal reformatted images were obtained.  CT dose lowering   techniques were used, to include: automated exposure control, adjustment for patient size, and or use of iterative reconstruction.    Findings:    Lung bases: There is a calcified granuloma in the right lower lobe. There is a small hiatal hernia.    Liver: The liver is enlarged and diffusely hypodense. There are a few calcified hepatic granulomas. There is redemonstration of numerous small calcifications in the region of the rory hepatis. These are unchanged.    Spleen: There are innumerable splenic granulomas.    Pancreas: Normal.    Adrenal glands: Normal.    Gallbladder: There are multiple small gallstones. The gallbladder is mildly distended.    Kidneys: The kidneys demonstrate symmetric enhancement. There is no hydronephrosis or obstructive uropathy.    Abdominal mesentery: There is no pathologic intra-abdominal mass or adenopathy.    Bowel loops: There is colonic diverticulosis. There is no evidence of acute diverticulitis. There is no evidence of appendicitis. There is no small bowel obstruction. Incidental note is made of a duodenal diverticulum.    CT PELVIS:  The genitourinary structures are intact. There is no significant free fluid in the pelvis.    Abdominal aorta: There are diffuse atherosclerotic changes. There is no aneurysm or dissection.    Osseous structures: The osseous structures are demineralized. There are moderate degenerative changes involving both hips. There are moderate multilevel degenerative changes in the spine. No acute fractures are identified.      Impression:      1. Cholelithiasis. The gallbladder is distended. Consider a follow-up right upper quadrant ultrasound for better characterization as determined clinically.  2. Hepatomegaly with hepatic  steatosis.  3. Diverticulosis.  4. Small hiatal hernia.  5. Additional incidental and chronic findings as above.    Slot 63    Electronically signed by:  Sheldon Gandhi M.D.    4/8/2023 6:41 PM Mountain Time              ECG 12 Lead Chest Pain   Preliminary Result   HEART RATE= 105  bpm   RR Interval= 572  ms   ID Interval=   ms   P Horizontal Axis=   deg   P Front Axis=   deg   QRSD Interval= 94  ms   QT Interval= 322  ms   QRS Axis= 44  deg   T Wave Axis= 241  deg   - ABNORMAL ECG -   Atrial fibrillation   Nonspecific repol abnormality, diffuse leads   When compared with ECG of 05-Apr-2023 14:47:53,   Significant rate increase   Significant repolarization change   Electronically Signed By:    Date and Time of Study: 2023-04-09 00:02:35           Assessment/Plan     Active Hospital Problems    Diagnosis  POA   • **Cholecystitis [K81.9]  Yes      Resolved Hospital Problems   No resolved problems to display.     Cholecystitis  With associated vomiting  Continue ondansetron  General surgery consulted  Continue Zosyn  As needed fentanyl given at outlying facility-morphine every 4 hours as needed x2 doses ordered  N.p.o. for now pending general surgery evaluation  IV fluids initiated for gentle rehydration    Permanent atrial fibrillation  Chronic  Atrial fibrillation on EKG  Patient on home Eliquis-hold for now  Continuous cardiac monitoring and pulse oximetry     Hypertension  Chronic/stable   Normally controlled   Continue home losartan-hydrochlorothiazide  Vital signs per policy    · I discussed the patient's findings and my recommendations with patient.    VTE Prophylaxis - SCDs.  Code Status - Full code.       SAILAJA Porras  [facility]  04/09/23  04:16 EDT

## 2023-04-09 NOTE — ED NOTES
Pt has accepting bed at [facility]. Going to bed 4103-1. Verbal report given to MORENITA Cano. EMS transport request placed.

## 2023-04-09 NOTE — ED NOTES
2358- Pt called out stating she had epigastric pain radiating to chest and teeth. MD made aware, EKG and trop ordered.   EKG obtained, pt with hx of A-fib. A-fib on monitor. VS updated Trop taken to lab. Medicated for pain per orders.

## 2023-04-09 NOTE — ED NOTES
S: Pt to ED from home c/o epigastric pain and nausea starting around 1600.    B: PMH A-fib (+eliquis), HTN, Hypothyroidism, diverticulosis, GERD, COPD. Scheduled for surgery for BCC on nose 4/17. Surgical hx appendectomy. Ambulatory independently and lives alone at baseline. 20g IV RAC and 20g IV LFA.     A: Pt alert and oriented x4. Epigastric pain and nausea. 1 episode vomiting undigested food around 1900. After vomiting pt stated significant pain relief. US shows distended gallbladder and fatty liver. Around 0000, pt c/o epigastric pain radiating to teeth. EKG showed A-fib. SBP 90s-120s. Heart rate irregular 80s-110s. Lung sounds clear throughout. Bowel sounds heard all 4 quadrants. Elevated lactic 2.4.     R: Pt to be admitted and transferred to [facility] for IV abx and possible gallbladder surgical intervention.

## 2023-04-09 NOTE — CONSULTS
General Surgery Consult Note      Name: Kingsley Hunt ADMIT: 2023   : 1940  PCP: Amanda Quinones DO    MRN: [id number] LOS: 0 days   AGE/SEX: 82 y.o. female  ROOM: [address] [place] [address]      Patient Care Team:  Amanda Quinones DO as PCP - General (Family Medicine)  Marbin Lawrence MD as Consulting Physician (Cardiac Electrophysiology)  Dara Means MD as Consulting Physician (Gastroenterology)  Jake Glynn OD (Optometry)  Chief Complaint   Patient presents with   • Abdominal Pain   • Vomiting     After eating felt like food is stuck at top of stomach, took omeprazole and baking soda, having severe pain in abd now       HPI  82 y.o. female who presented to [facility] ED for sudden onset abdominal pain pain associated with some nausea and vomiting.  She reports pain was severe and different from pain associated with reflux.  At [facility] labs were drawn and imaging was obtained.  Labs significant for elevated transaminases, alk phos, bilirubin.  CT scan of the abdomen pelvis and abdominal ultrasound showed cholelithiasis but no evidence of gallbladder wall thickening or pericholecystic fluid.  She was subsequently transferred for evaluation for cholecystectomy.  Repeat labs showed uptrend in transaminases, alk phos, bilirubin.  MRCP ordered and GI consulted due to concern for choledocholithiasis.  Pain has since subsided.  Of note patient is on Eliquis and last took Eliquis on 2023.    Past Medical History:   Diagnosis Date   • Arthritis    • BCC     Nose   • CHOL    • COPD 2014   • Diverticulosis    • GERD    • HTN    • Hypothyroid      Past Surgical History:   Procedure Laterality Date   • APPENDECTOMY     • CATARACT EXTRACTION WITH INTRAOCULAR LENS IMPLANT Bilateral    • COLONOSCOPY     • ENDOSCOPY     • SKIN BIOPSY      Nose   • WRIST FRACTURE SURGERY Left    Abdominoplasty    Family History   Problem Relation Age of Onset   • Brain cancer  Mother    • COPD Father    • Diabetes Sister    • Heart disease Sister    • Heart disease Brother    • No Known Problems Brother    • Diabetes Son      Social History     Tobacco Use   • Smoking status: Former     Packs/day: 1.00     Years: 10.00     Pack years: 10.00     Types: Cigarettes     Quit date:      Years since quittin.2     Passive exposure: Past   • Smokeless tobacco: Never   • Tobacco comments:     Quit 30+ years ago   Vaping Use   • Vaping Use: Never used   Substance Use Topics   • Alcohol use: Yes     Alcohol/week: 3.0 standard drinks     Types: 3 Glasses of wine per week     Comment: rare   • Drug use: No     Medications Prior to Admission   Medication Sig Dispense Refill Last Dose   • acebutolol (SECTRAL) 200 MG capsule Take 1 capsule by mouth Daily. 90 capsule 1 2023   • apixaban (ELIQUIS) 5 MG tablet tablet Take 1 tablet by mouth Every 12 (Twelve) Hours. 180 tablet 3 2023   • b complex vitamins capsule Take 1 capsule by mouth Daily. 30 capsule 11 Past Week   • calcium carbonate (TUMS) 500 MG chewable tablet Chew 1 tablet Daily.   2023   • famotidine (PEPCID) 20 MG tablet Take 1 tablet by mouth. Every other day   Past Week   • levothyroxine (SYNTHROID, LEVOTHROID) 50 MCG tablet Take 1 tablet by mouth Every Night. 90 tablet 2 2023   • losartan-hydrochlorothiazide (Hyzaar) 100-12.5 MG per tablet Take 1 tablet by mouth Daily. 90 tablet 1 2023   • omeprazole (priLOSEC) 20 MG capsule Take 1 by mouth every other day   2023   • polyethylene glycol (MiraLax) 17 g packet Take 17 g by mouth Daily.   2023   • Accu-Chek Guide test strip test BLOOD SUGAR AT least TWICE A WEEK      • albuterol sulfate  (90 Base) MCG/ACT inhaler INHALE ONE (1) PUFF EVERY 4 HOURS AS NEEDED   Unknown   • vitamin D3 125 MCG (5000 UT) capsule capsule Take 1 capsule by mouth Daily.        losartan, 100 mg, Oral, Q24H   And  hydroCHLOROthiazide, 12.5 mg, Oral, Q24H  levothyroxine, 50 mcg,  Oral, Nightly  [START ON 4/10/2023] pantoprazole, 40 mg, Intravenous, Q AM  piperacillin-tazobactam, 3.375 g, Intravenous, Q8H  polyethylene glycol, 17 g, Oral, BID  sucralfate, 1 g, Oral, 4x Daily AC & at Bedtime  vitamin D3, 5,000 Units, Oral, Daily         •  albuterol  •  magnesium sulfate **OR** magnesium sulfate **OR** magnesium sulfate  •  Morphine  •  ondansetron  •  potassium & sodium phosphates **OR** potassium & sodium phosphates  •  potassium chloride  •  potassium chloride  •  sodium chloride  Patient has no known allergies.    Review of Systems:   As noted above in HPI    Vitals:  Temp:  [97.4 °F (36.3 °C)-98.8 °F (37.1 °C)] 98.3 °F (36.8 °C)  Heart Rate:  [] 74  Resp:  [12-21] 15  BP: ()/(50-97) 93/60     Physical Exam:   No acute distress, alert  Nonlabored respirations  Abdomen soft, nondistended, nontender to palpation  Extremities warm and well-perfused with no gross deformities    Labs:  Results from last 7 days   Lab Units 04/09/23  1138 04/08/23  1856   WBC 10*3/mm3 12.20* 8.25   HEMOGLOBIN g/dL 11.9* 13.7   HEMATOCRIT % 34.4 39.1   PLATELETS 10*3/mm3 235 284     Results from last 7 days   Lab Units 04/09/23  1138 04/08/23  1856 04/05/23  1437   SODIUM mmol/L 133* 128*  --    POTASSIUM mmol/L 3.5 4.6 3.9   CHLORIDE mmol/L 94* 88*  --    CO2 mmol/L 29.0 30.5*  --    BUN mg/dL 12 11  --    CREATININE mg/dL 0.96 0.88  --    CALCIUM mg/dL 9.2 10.3  --    BILIRUBIN mg/dL 3.6* 1.7*  --    ALK PHOS U/L 174* 134*  --    ALT (SGPT) U/L 774* 84*  --    AST (SGOT) U/L 829* 166*  --    GLUCOSE mg/dL 161* 170*  --      Imaging:  CT abdomen/pelvis 4/8/2023  IMPRESSION:  1. Cholelithiasis. The gallbladder is distended. Consider a follow-up right upper quadrant ultrasound for better characterization as determined clinically.  2. Hepatomegaly with hepatic steatosis.  3. Diverticulosis.  4. Small hiatal hernia.  5. Additional incidental and chronic findings as above.    Gallbladder ultrasound  4/8/2023  IMPRESSION:  1.  Distended gallbladder. Cholelithiasis. Correlate for cholecystitis.  2.  Lobular echogenic material in the gallbladder lumen without flow, most compatible with sludge.  3.  Fatty liver  Assessment and Plan:  82 y.o. female with acute onset abdominal pain, elevated transaminases, alk phos, bilirubin and imaging showing cholelithiasis without evidence of acute cholecystitis.    - Given lab abnormalities concern for choledocholithiasis  - GI consulted and MRCP ordered and pending  - Hold Eliquis for possible surgical intervention  - Suspect work-up will show abnormalities secondary to cholelithiasis and will recommend cholecystectomy this admission  - The surgery along with associated risk, benefits, alternatives were discussed with the patient; she expressed understanding and would be agreeable if cholecystectomy is recommended  - Will continue to follow    This note was created using Dragon Voice Recognition software.    Sondra Munoz MD  04/09/23  18:09 EDT

## 2023-04-09 NOTE — ED NOTES
Pt updated on CT results and updated plan of care including need for US of gallbladder. Per pt request, discussed plan of care with pt while pt daughter on phone. Both pt and pt daughter stated understanding. Pt c/o mild nausea but declines wanting ordered Zofran at this time. Upper abd pain 2/10. Pt educated on how to use call bell. Lights dimmed and pt provided with additional blankets for comfort at this time. Per [employer], US has been paged.

## 2023-04-09 NOTE — CONSULTS
GI CONSULT  NOTE:    Referring Provider:    Dr Sneed    Chief complaint:   Elevated LFTs    Subjective   Abdominal pain, nausea vomiting.    History of present illness:    Patient is a 82-year-old female with a history of With a history of diverticulitis of the sigmoid colon, constipation, gastritis, hiatal hernia, atrial fibrillation on Eliquis, diabetes, GERD, high blood pressure who presented to [place] ER with a complaint of abdominal pain, nausea vomiting.  Patient was evaluated via CT and found to have a distended gallbladder as well as gallstones.  Ultrasound showed possible cholecystitis.  Patient was given Zosyn and transferred to [facility] for surgical consult.  GI was consulted for elevated LFTs.    Patient states she began having abdominal pain at about 1600 Friday.  She took 3 Tums and 2 omeprazole 20 mg.  She felt abdominal distention.  Took some baking soda and had some relief.  Patient states she did not have any fever and chills at home but did have low-grade fever in the ER.  I do not see a documented fever on her vital signs though.  Patient is complaining of abdominal abdominal.  That presses up into her epigastric area.  She had 1 episode of vomiting of undigested food.  She denies any liver problems.  Patient states she does drink a bottle of red wine occasionally.  She cannot quantify how long it takes her to drink that bottle.    Labs: Sodium 133, potassium 4.6, creatinine 0.96  TB 1.7->3.6, alk phos 134-> 174, -> 829, ALT 84-> 774.  Lipase 76.  WBCs 12.2, hemoglobin 11.9, platelets 235.  Urinalysis negative.  CT of the abdomen and pelvis also showed hepatic steatosis.  Gallbladder showed fatty liver and CBD of 6 mm.    Endo History:  Colonoscopy 9/18 - melanosis coli, diverticulosis sig/transverse colon, TA polyp. NO recall.  5/18 EGD (Dr. Means) - duodenitis, gastritis, LA grade B esophagitis in GE jx, h/h, bx negative for h pylori  5/17 EGD with dil (Dr Means)  Erythema in the antrum and stomach body compatible with gastritis. (Biopsy-reactive gastropathy. HP negative). Esophageal food. LA Grade A esophagitis in the gastroesophageal junction. Hiatal Hernia in the cardia.   EGD/Colonoscopy (Dr Means) nonerosive antral gastritis, 3 cm HH. Diverticulitis, Transverse colon polyp-focal HP changes, very minimal adenomatous change involving 3 crypts. Internal & external hemorrhoids    Past Medical History:  Past Medical History:   Diagnosis Date   • Arthritis    • BCC     Nose   • CHOL    • COPD 2014   • Diverticulosis    • GERD    • HTN    • Hypothyroid        Past Surgical History:  Past Surgical History:   Procedure Laterality Date   • APPENDECTOMY     • CATARACT EXTRACTION WITH INTRAOCULAR LENS IMPLANT Bilateral    • COLONOSCOPY     • ENDOSCOPY     • SKIN BIOPSY      Nose   • WRIST FRACTURE SURGERY Left        Social History:  Social History     Tobacco Use   • Smoking status: Former     Packs/day: 1.00     Years: 10.00     Pack years: 10.00     Types: Cigarettes     Quit date:      Years since quittin.2     Passive exposure: Past   • Smokeless tobacco: Never   • Tobacco comments:     Quit 30+ years ago   Vaping Use   • Vaping Use: Never used   Substance Use Topics   • Alcohol use: Yes     Alcohol/week: 3.0 standard drinks     Types: 3 Glasses of wine per week     Comment: rare   • Drug use: No       Family History:  Family History   Problem Relation Age of Onset   • Brain cancer Mother    • COPD Father    • Diabetes Sister    • Heart disease Sister    • Heart disease Brother    • No Known Problems Brother    • Diabetes Son        Medications:  Medications Prior to Admission   Medication Sig Dispense Refill Last Dose   • acebutolol (SECTRAL) 200 MG capsule Take 1 capsule by mouth Daily. 90 capsule 1 2023   • apixaban (ELIQUIS) 5 MG tablet tablet Take 1 tablet by mouth Every 12 (Twelve) Hours. 180 tablet 3 2023   • b complex vitamins capsule  Take 1 capsule by mouth Daily. 30 capsule 11 Past Week   • calcium carbonate (TUMS) 500 MG chewable tablet Chew 1 tablet Daily.   4/8/2023   • famotidine (PEPCID) 20 MG tablet Take 1 tablet by mouth. Every other day   Past Week   • levothyroxine (SYNTHROID, LEVOTHROID) 50 MCG tablet Take 1 tablet by mouth Every Night. 90 tablet 2 4/8/2023   • losartan-hydrochlorothiazide (Hyzaar) 100-12.5 MG per tablet Take 1 tablet by mouth Daily. 90 tablet 1 4/8/2023   • omeprazole (priLOSEC) 20 MG capsule Take 1 by mouth every other day   4/8/2023   • polyethylene glycol (MiraLax) 17 g packet Take 17 g by mouth Daily.   4/8/2023   • Accu-Chek Guide test strip test BLOOD SUGAR AT least TWICE A WEEK      • albuterol sulfate  (90 Base) MCG/ACT inhaler INHALE ONE (1) PUFF EVERY 4 HOURS AS NEEDED   Unknown   • vitamin D3 125 MCG (5000 UT) capsule capsule Take 1 capsule by mouth Daily.          Scheduled Meds:losartan, 100 mg, Oral, Q24H   And  hydroCHLOROthiazide, 12.5 mg, Oral, Q24H  levothyroxine, 50 mcg, Oral, Nightly  pantoprazole, 40 mg, Oral, Q AM  piperacillin-tazobactam, 3.375 g, Intravenous, Q8H  polyethylene glycol, 17 g, Oral, Daily  vitamin D3, 5,000 Units, Oral, Daily      Continuous Infusions:   PRN Meds:.•  albuterol  •  magnesium sulfate **OR** magnesium sulfate **OR** magnesium sulfate  •  Morphine  •  ondansetron  •  potassium & sodium phosphates **OR** potassium & sodium phosphates  •  potassium chloride  •  potassium chloride  •  sodium chloride    ALLERGIES:  Patient has no known allergies.    ROS:  Review of Systems   Gastrointestinal: Positive for abdominal pain, constipation, nausea and vomiting.     The following systems were reviewed and negative;  Constitution:  No fevers, chills, no unintentional weight loss  Skin: no rash, no jaundice  Eyes:  No blurry vision, no eye pain  HENT:  No change in hearing or smell  Resp:  No dyspnea or cough  CV:  No chest pain or palpitations  :  No dysuria,  hematuria  Musculoskeletal:  No leg cramps or arthralgias  Neuro:  No tremor, no numbness  Psych:  No depression or confusion    Objective  Rm 4103, up ambulatory in room.  Daughter present.  Very talkative.  In no distress.      Vital Signs:   Vitals:    04/09/23 0301 04/09/23 0400 04/09/23 0736 04/09/23 1210   BP: 99/50 97/51 152/97 106/67   BP Location: Right arm Right arm Right arm Right arm   Patient Position: Lying Lying Lying Lying   Pulse: 90 78 82 83   Resp: 16 16 13 12   Temp: 97.6 °F (36.4 °C) 98.3 °F (36.8 °C) 97.4 °F (36.3 °C) 97.9 °F (36.6 °C)   TempSrc: Oral Oral Oral Oral   SpO2: 95% 97% 98% 97%   Weight:       Height:           Physical Exam:   General Appearance:    Awake and alert, in no acute distress   Head:    Normocephalic, without obvious abnormality, atraumatic   Eyes:            Conjunctivae normal, anicteric sclerae, pupils equal   Ears:    Ears appear intact with no abnormalities noted   Throat:   No oral lesions, no thrush, oral mucosa moist   Neck:   Supple, no JVD   Lungs:      respirations regular, even and unlabored       Chest Wall:    No abnormalities observed   Abdomen:    soft, NO tenderness no rebound or guarding, nondistended, no hepatosplenomegaly   Rectal:     Deferred   Extremities:   Moves all extremities, no edema, no cyanosis   Pulses:   Pulses palpable and equal bilaterally   Skin:   No rash, no jaundice, normal palpation       Neurologic:   Cranial nerves 2 - 12 grossly intact, no asterixis       Results Review:   I reviewed the patient's labs and imaging.  CBC    Results from last 7 days   Lab Units 04/09/23  1138 04/08/23  1856   WBC 10*3/mm3 12.20* 8.25   HEMOGLOBIN g/dL 11.9* 13.7   PLATELETS 10*3/mm3 235 284     CMP   Results from last 7 days   Lab Units 04/09/23  1138 04/08/23  1856 04/05/23  1437   SODIUM mmol/L 133* 128*  --    POTASSIUM mmol/L 3.5 4.6 3.9   CHLORIDE mmol/L 94* 88*  --    CO2 mmol/L 29.0 30.5*  --    BUN mg/dL 12 11  --    CREATININE mg/dL 0.96  0.88  --    GLUCOSE mg/dL 161* 170*  --    ALBUMIN g/dL 3.7 4.4  --    BILIRUBIN mg/dL 3.6* 1.7*  --    ALK PHOS U/L 174* 134*  --    AST (SGOT) U/L 829* 166*  --    ALT (SGPT) U/L 774* 84*  --    MAGNESIUM mg/dL 1.8  --   --    PHOSPHORUS mg/dL 4.4  --   --    LIPASE U/L  --  76*  --      Cr Clearance Estimated Creatinine Clearance: 47.5 mL/min (by C-G formula based on SCr of 0.96 mg/dL).  Coag     HbA1C   Lab Results   Component Value Date    HGBA1C 6.00 (H) 04/05/2023    HGBA1C 6.20 (H) 03/20/2023     Blood Glucose No results found for: POCGLU  Infection     UA    Results from last 7 days   Lab Units 04/08/23  2328   NITRITE UA  Negative     Radiology(recent) CT Abdomen Pelvis With Contrast    Result Date: 4/8/2023  1. Cholelithiasis. The gallbladder is distended. Consider a follow-up right upper quadrant ultrasound for better characterization as determined clinically. 2. Hepatomegaly with hepatic steatosis. 3. Diverticulosis. 4. Small hiatal hernia. 5. Additional incidental and chronic findings as above. Slot 63 Electronically signed by:  Sheldon Gandhi M.D.  4/8/2023 6:41 PM Mountain Time    US Gallbladder    Result Date: 4/9/2023  1.  Distended gallbladder. Cholelithiasis. Correlate for cholecystitis. 2.  Lobular echogenic material in the gallbladder lumen without flow, most compatible with sludge. 3.  Fatty liver Electronically signed by:  Shantell Murillo M.D.  4/8/2023 10:38 PM Mountain Time        ASSESSMENT:  Elevated LFTs consider gallstones as well as fatty liver disease plus or minus choledocholithiasis  Abdominal pain, nausea and vomiting consider related to cholecystitis  Atrial fibrillation on Eliquis  History of constipation  GERD  Diverticulosis with a history of diverticulitis  Diabetes  Hyponatremia  Leukocytosis  Normocytic anemia  COPD  Hypertension    PLAN:  We will order MRCP as well as full liver serologies.  MRI will probably not be done today due to the holiday.  Consider ERCP if MRCP is  positive.  General surgery has been consulted for possible cholecystectomy.  Hold Eliquis for possible pending procedures.  PPI for history of reflux, will add Carafate as she complains of esophageal burning that is intense.  Bowel regimen for history of constipation  Zosyn for cholecystitis      I discussed the patient's findings and my recommendations with the patient.  Naomi Dick, APRN  04/09/23  13:48 EDT    Time:

## 2023-04-09 NOTE — PLAN OF CARE
Goal Outcome Evaluation:               Patient is A&ox4, on RA, VSS. Patient is currently NPO. MRCP planned for tomorrow. Pt. Is complaining of pain and being treated with PRN pain medications. Bed in lowest position, call light within reach, plan of care ongoing.

## 2023-04-10 ENCOUNTER — APPOINTMENT (OUTPATIENT)
Dept: MRI IMAGING | Facility: HOSPITAL | Age: 83
DRG: 418 | End: 2023-04-10
Payer: MEDICARE

## 2023-04-10 ENCOUNTER — INPATIENT HOSPITAL (AMBULATORY)
Dept: URBAN - METROPOLITAN AREA HOSPITAL 84 | Facility: HOSPITAL | Age: 83
End: 2023-04-10

## 2023-04-10 DIAGNOSIS — D64.9 ANEMIA, UNSPECIFIED: ICD-10-CM

## 2023-04-10 DIAGNOSIS — K57.90 DIVERTICULOSIS OF INTESTINE, PART UNSPECIFIED, WITHOUT PERFO: ICD-10-CM

## 2023-04-10 DIAGNOSIS — R10.9 UNSPECIFIED ABDOMINAL PAIN: ICD-10-CM

## 2023-04-10 DIAGNOSIS — K21.9 GASTRO-ESOPHAGEAL REFLUX DISEASE WITHOUT ESOPHAGITIS: ICD-10-CM

## 2023-04-10 DIAGNOSIS — D72.829 ELEVATED WHITE BLOOD CELL COUNT, UNSPECIFIED: ICD-10-CM

## 2023-04-10 DIAGNOSIS — E87.1 HYPO-OSMOLALITY AND HYPONATREMIA: ICD-10-CM

## 2023-04-10 DIAGNOSIS — R11.2 NAUSEA WITH VOMITING, UNSPECIFIED: ICD-10-CM

## 2023-04-10 DIAGNOSIS — R94.5 ABNORMAL RESULTS OF LIVER FUNCTION STUDIES: ICD-10-CM

## 2023-04-10 LAB
ALBUMIN SERPL-MCNC: 3.4 G/DL (ref 3.5–5.2)
ALBUMIN SERPL-MCNC: 3.7 G/DL (ref 3.5–5.2)
ALBUMIN SERPL-MCNC: 3.7 G/DL (ref 3.5–5.2)
ALBUMIN/GLOB SERPL: 1.4 G/DL
ALBUMIN/GLOB SERPL: 1.6 G/DL
ALP SERPL-CCNC: 163 U/L (ref 39–117)
ALP SERPL-CCNC: 167 U/L (ref 39–117)
ALP SERPL-CCNC: 173 U/L (ref 39–117)
ALPHA-FETOPROTEIN: 12.9 NG/ML (ref 0–8.3)
ALT SERPL W P-5'-P-CCNC: 412 U/L (ref 1–33)
ALT SERPL W P-5'-P-CCNC: 563 U/L (ref 1–33)
ALT SERPL W P-5'-P-CCNC: 629 U/L (ref 1–33)
ANA SER QL: NEGATIVE
ANION GAP SERPL CALCULATED.3IONS-SCNC: 10 MMOL/L (ref 5–15)
ANION GAP SERPL CALCULATED.3IONS-SCNC: 9 MMOL/L (ref 5–15)
AST SERPL-CCNC: 251 U/L (ref 1–32)
AST SERPL-CCNC: 415 U/L (ref 1–32)
AST SERPL-CCNC: 530 U/L (ref 1–32)
BASOPHILS # BLD AUTO: 0 10*3/MM3 (ref 0–0.2)
BASOPHILS # BLD AUTO: 0 10*3/MM3 (ref 0–0.2)
BASOPHILS NFR BLD AUTO: 0.3 % (ref 0–1.5)
BASOPHILS NFR BLD AUTO: 0.4 % (ref 0–1.5)
BILIRUB CONJ SERPL-MCNC: 2.3 MG/DL (ref 0–0.3)
BILIRUB INDIRECT SERPL-MCNC: 0.9 MG/DL
BILIRUB SERPL-MCNC: 2.5 MG/DL (ref 0–1.2)
BILIRUB SERPL-MCNC: 3.2 MG/DL (ref 0–1.2)
BILIRUB SERPL-MCNC: 4 MG/DL (ref 0–1.2)
BUN SERPL-MCNC: 11 MG/DL (ref 8–23)
BUN SERPL-MCNC: 14 MG/DL (ref 8–23)
BUN/CREAT SERPL: 10.8 (ref 7–25)
BUN/CREAT SERPL: 13.2 (ref 7–25)
CALCIUM SPEC-SCNC: 9 MG/DL (ref 8.6–10.5)
CALCIUM SPEC-SCNC: 9.5 MG/DL (ref 8.6–10.5)
CHLORIDE SERPL-SCNC: 93 MMOL/L (ref 98–107)
CHLORIDE SERPL-SCNC: 98 MMOL/L (ref 98–107)
CO2 SERPL-SCNC: 27 MMOL/L (ref 22–29)
CO2 SERPL-SCNC: 28 MMOL/L (ref 22–29)
CREAT SERPL-MCNC: 1.02 MG/DL (ref 0.57–1)
CREAT SERPL-MCNC: 1.06 MG/DL (ref 0.57–1)
CRP SERPL-MCNC: 7.92 MG/DL (ref 0–0.5)
DEPRECATED RDW RBC AUTO: 47.3 FL (ref 37–54)
DEPRECATED RDW RBC AUTO: 47.3 FL (ref 37–54)
EGFRCR SERPLBLD CKD-EPI 2021: 51.9 ML/MIN/1.73
EGFRCR SERPLBLD CKD-EPI 2021: 54.4 ML/MIN/1.73
EOSINOPHIL # BLD AUTO: 0.1 10*3/MM3 (ref 0–0.4)
EOSINOPHIL # BLD AUTO: 0.2 10*3/MM3 (ref 0–0.4)
EOSINOPHIL NFR BLD AUTO: 1.5 % (ref 0.3–6.2)
EOSINOPHIL NFR BLD AUTO: 3.3 % (ref 0.3–6.2)
ERYTHROCYTE [DISTWIDTH] IN BLOOD BY AUTOMATED COUNT: 13.4 % (ref 12.3–15.4)
ERYTHROCYTE [DISTWIDTH] IN BLOOD BY AUTOMATED COUNT: 13.5 % (ref 12.3–15.4)
ERYTHROCYTE [SEDIMENTATION RATE] IN BLOOD: 12 MM/HR (ref 0–30)
GLOBULIN UR ELPH-MCNC: 2.3 GM/DL
GLOBULIN UR ELPH-MCNC: 2.4 GM/DL
GLUCOSE SERPL-MCNC: 125 MG/DL (ref 65–99)
GLUCOSE SERPL-MCNC: 139 MG/DL (ref 65–99)
HCT VFR BLD AUTO: 31.9 % (ref 34–46.6)
HCT VFR BLD AUTO: 33.9 % (ref 34–46.6)
HGB BLD-MCNC: 11.1 G/DL (ref 12–15.9)
HGB BLD-MCNC: 11.8 G/DL (ref 12–15.9)
HOLD SPECIMEN: NORMAL
LYMPHOCYTES # BLD AUTO: 0.5 10*3/MM3 (ref 0.7–3.1)
LYMPHOCYTES # BLD AUTO: 0.8 10*3/MM3 (ref 0.7–3.1)
LYMPHOCYTES NFR BLD AUTO: 13.7 % (ref 19.6–45.3)
LYMPHOCYTES NFR BLD AUTO: 5 % (ref 19.6–45.3)
MAGNESIUM SERPL-MCNC: 1.8 MG/DL (ref 1.6–2.4)
MAGNESIUM SERPL-MCNC: 1.8 MG/DL (ref 1.6–2.4)
MCH RBC QN AUTO: 33.5 PG (ref 26.6–33)
MCH RBC QN AUTO: 33.6 PG (ref 26.6–33)
MCHC RBC AUTO-ENTMCNC: 34.8 G/DL (ref 31.5–35.7)
MCHC RBC AUTO-ENTMCNC: 34.8 G/DL (ref 31.5–35.7)
MCV RBC AUTO: 96.2 FL (ref 79–97)
MCV RBC AUTO: 96.5 FL (ref 79–97)
MONOCYTES # BLD AUTO: 0.7 10*3/MM3 (ref 0.1–0.9)
MONOCYTES # BLD AUTO: 0.7 10*3/MM3 (ref 0.1–0.9)
MONOCYTES NFR BLD AUTO: 12.3 % (ref 5–12)
MONOCYTES NFR BLD AUTO: 6.8 % (ref 5–12)
NEUTROPHILS NFR BLD AUTO: 4.2 10*3/MM3 (ref 1.7–7)
NEUTROPHILS NFR BLD AUTO: 70.3 % (ref 42.7–76)
NEUTROPHILS NFR BLD AUTO: 8.5 10*3/MM3 (ref 1.7–7)
NEUTROPHILS NFR BLD AUTO: 86.4 % (ref 42.7–76)
NRBC BLD AUTO-RTO: 0 /100 WBC (ref 0–0.2)
NRBC BLD AUTO-RTO: 0.1 /100 WBC (ref 0–0.2)
NT-PROBNP SERPL-MCNC: 1894 PG/ML (ref 0–1800)
OSMOLALITY UR: 585 MOSM/KG (ref 300–800)
PHOSPHATE SERPL-MCNC: 4 MG/DL (ref 2.5–4.5)
PLATELET # BLD AUTO: 208 10*3/MM3 (ref 140–450)
PLATELET # BLD AUTO: 234 10*3/MM3 (ref 140–450)
PMV BLD AUTO: 8.5 FL (ref 6–12)
PMV BLD AUTO: 8.9 FL (ref 6–12)
POTASSIUM SERPL-SCNC: 3.6 MMOL/L (ref 3.5–5.2)
POTASSIUM SERPL-SCNC: 3.6 MMOL/L (ref 3.5–5.2)
PROT SERPL-MCNC: 5.8 G/DL (ref 6–8.5)
PROT SERPL-MCNC: 6 G/DL (ref 6–8.5)
PROT SERPL-MCNC: 6 G/DL (ref 6–8.5)
RBC # BLD AUTO: 3.31 10*6/MM3 (ref 3.77–5.28)
RBC # BLD AUTO: 3.52 10*6/MM3 (ref 3.77–5.28)
SODIUM SERPL-SCNC: 131 MMOL/L (ref 136–145)
SODIUM SERPL-SCNC: 134 MMOL/L (ref 136–145)
SODIUM UR-SCNC: 100 MMOL/L
URATE SERPL-MCNC: 4.7 MG/DL (ref 2.4–5.7)
WBC NRBC COR # BLD: 6 10*3/MM3 (ref 3.4–10.8)
WBC NRBC COR # BLD: 9.8 10*3/MM3 (ref 3.4–10.8)

## 2023-04-10 PROCEDURE — 85025 COMPLETE CBC W/AUTO DIFF WBC: CPT | Performed by: INTERNAL MEDICINE

## 2023-04-10 PROCEDURE — 25010000002 GADOTERIDOL PER 1 ML: Performed by: INTERNAL MEDICINE

## 2023-04-10 PROCEDURE — 80053 COMPREHEN METABOLIC PANEL: CPT | Performed by: NURSE PRACTITIONER

## 2023-04-10 PROCEDURE — 84300 ASSAY OF URINE SODIUM: CPT | Performed by: INTERNAL MEDICINE

## 2023-04-10 PROCEDURE — 83735 ASSAY OF MAGNESIUM: CPT | Performed by: INTERNAL MEDICINE

## 2023-04-10 PROCEDURE — 25010000002 PIPERACILLIN SOD-TAZOBACTAM PER 1 G: Performed by: NURSE PRACTITIONER

## 2023-04-10 PROCEDURE — 25010000002 LORAZEPAM PER 2 MG: Performed by: INTERNAL MEDICINE

## 2023-04-10 PROCEDURE — 81256 HFE GENE: CPT | Performed by: NURSE PRACTITIONER

## 2023-04-10 PROCEDURE — 82248 BILIRUBIN DIRECT: CPT | Performed by: NURSE PRACTITIONER

## 2023-04-10 PROCEDURE — 83935 ASSAY OF URINE OSMOLALITY: CPT | Performed by: INTERNAL MEDICINE

## 2023-04-10 PROCEDURE — A9579 GAD-BASE MR CONTRAST NOS,1ML: HCPCS | Performed by: INTERNAL MEDICINE

## 2023-04-10 PROCEDURE — 82570 ASSAY OF URINE CREATININE: CPT | Performed by: INTERNAL MEDICINE

## 2023-04-10 PROCEDURE — 74183 MRI ABD W/O CNTR FLWD CNTR: CPT

## 2023-04-10 PROCEDURE — 99231 SBSQ HOSP IP/OBS SF/LOW 25: CPT | Performed by: SURGERY

## 2023-04-10 PROCEDURE — 99232 SBSQ HOSP IP/OBS MODERATE 35: CPT | Mod: FS | Performed by: NURSE PRACTITIONER

## 2023-04-10 RX ORDER — MORPHINE SULFATE 2 MG/ML
2 INJECTION, SOLUTION INTRAMUSCULAR; INTRAVENOUS EVERY 4 HOURS PRN
Status: ACTIVE | OUTPATIENT
Start: 2023-04-10 | End: 2023-04-11

## 2023-04-10 RX ORDER — LORAZEPAM 2 MG/ML
0.25 INJECTION INTRAMUSCULAR EVERY 4 HOURS PRN
Status: DISCONTINUED | OUTPATIENT
Start: 2023-04-10 | End: 2023-04-12

## 2023-04-10 RX ORDER — NALOXONE HCL 0.4 MG/ML
0.4 VIAL (ML) INJECTION
Status: DISCONTINUED | OUTPATIENT
Start: 2023-04-10 | End: 2023-04-13 | Stop reason: HOSPADM

## 2023-04-10 RX ORDER — LEVOTHYROXINE SODIUM 0.05 MG/1
50 TABLET ORAL DAILY
COMMUNITY

## 2023-04-10 RX ADMIN — PIPERACILLIN SODIUM AND TAZOBACTAM SODIUM 3.38 G: 3; .375 INJECTION, POWDER, LYOPHILIZED, FOR SOLUTION INTRAVENOUS at 18:21

## 2023-04-10 RX ADMIN — LORAZEPAM 0.25 MG: 2 INJECTION INTRAMUSCULAR; INTRAVENOUS at 20:23

## 2023-04-10 RX ADMIN — PIPERACILLIN SODIUM AND TAZOBACTAM SODIUM 3.38 G: 3; .375 INJECTION, POWDER, LYOPHILIZED, FOR SOLUTION INTRAVENOUS at 00:35

## 2023-04-10 RX ADMIN — LEVOTHYROXINE SODIUM 50 MCG: 0.05 TABLET ORAL at 20:23

## 2023-04-10 RX ADMIN — SUCRALFATE 1 G: 1 TABLET ORAL at 18:21

## 2023-04-10 RX ADMIN — PIPERACILLIN SODIUM AND TAZOBACTAM SODIUM 3.38 G: 3; .375 INJECTION, POWDER, LYOPHILIZED, FOR SOLUTION INTRAVENOUS at 09:49

## 2023-04-10 RX ADMIN — PANTOPRAZOLE SODIUM 40 MG: 40 INJECTION, POWDER, LYOPHILIZED, FOR SOLUTION INTRAVENOUS at 05:44

## 2023-04-10 RX ADMIN — POLYETHYLENE GLYCOL 3350 17 G: 17 POWDER, FOR SOLUTION ORAL at 20:23

## 2023-04-10 RX ADMIN — GADOTERIDOL 20 ML: 279.3 INJECTION, SOLUTION INTRAVENOUS at 08:43

## 2023-04-10 RX ADMIN — LORAZEPAM 0.25 MG: 2 INJECTION INTRAMUSCULAR; INTRAVENOUS at 12:10

## 2023-04-10 RX ADMIN — SUCRALFATE 1 G: 1 TABLET ORAL at 20:23

## 2023-04-10 NOTE — CASE MANAGEMENT/SOCIAL WORK
Discharge Planning Assessment  [facility]     Patient Name: Kingsley Hunt  MRN: [id number]  Today's Date: 4/10/2023    Admit Date: 4/8/2023    Plan: Home.  Son or daughter will stay with patient at discharge.  Family to transport home at discharge.   Discharge Needs Assessment     Row Name 04/10/23 1511       Living Environment    People in Home alone    Current Living Arrangements home    Primary Care Provided by self    Provides Primary Care For no one    Family Caregiver if Needed child(itz), adult    Quality of Family Relationships supportive;helpful;involved    Able to Return to Prior Arrangements yes       Resource/Environmental Concerns    Resource/Environmental Concerns none    Transportation Concerns none       Transition Planning    Patient/Family Anticipates Transition to home    Patient/Family Anticipated Services at Transition none    Transportation Anticipated family or friend will provide       Discharge Needs Assessment    Readmission Within the Last 30 Days no previous admission in last 30 days    Equipment Currently Used at Home none    Concerns to be Addressed care coordination/care conferences;discharge planning    Anticipated Changes Related to Illness none    Equipment Needed After Discharge none    Provided Post Acute Provider List? N/A    N/A Provider List Comment denies dc needs               Discharge Plan     Row Name 04/10/23 1512       Plan    Plan Home.  Son or daughter will stay with patient at discharge.  Family to transport home at discharge.    Patient/Family in Agreement with Plan yes    Plan Comments Met with patient at bedside.  Confirmed pcp and pharmacy.  Lives at home alone and drives.  Denies dc needs. Daughter states she will stay with her mom if needed after discharge.              Continued Care and Services - Admitted Since 4/8/2023    Coordination has not been started for this encounter.       Expected Discharge Date and Time     Expected Discharge Date Expected  Discharge Time    Apr 11, 2023          Demographic Summary     Row Name 04/10/23 1509       General Information    Admission Type inpatient    Arrived From emergency department    Referral Source admission list    Reason for Consult discharge planning    Preferred Language English    Row Name 04/10/23 1409       General Information    Reason for Consult health care directive    General Information Comments SW was consulted re: ACP. EMMA met with pt and her 2 dtrs in room 4103 to discuss further. It was clarified that pt wants the form, but doesn't wish to execute any advanced directives today. EMMA provided pt with an ACP packet; however, her dtr had questions about how general the document was worded. EMMA directed them to pt's PCP to discuss completing a POST form. They thanked this writer and deny further needs at this time.               Functional Status     Row Name 04/10/23 1511       Functional Status    Usual Activity Tolerance good    Current Activity Tolerance good       Functional Status, IADL    Medications independent    Meal Preparation independent    Housekeeping independent    Laundry independent    Shopping independent       Mental Status    General Appearance WDL WDL       Mental Status Summary    Recent Changes in Mental Status/Cognitive Functioning no changes                    Laney Jane RN Met with patient in room wearing PPE: mask, face shield/goggles, gloves, gown.      Maintained distance greater than six feet and spent less than 15 minutes in the room.

## 2023-04-10 NOTE — PLAN OF CARE
Goal Outcome Evaluation:               Pt s/p MRI Abdomen today, with plan for cholecystectomy per Dr. Munoz on 4/11. Pt c/o anxiety, orders for IV Ativan obtained. Pt educated on Ativan including usage, side effects etc. Pt tolerated administration well without adverse reactions. VSS on room air. Plan ongoing.

## 2023-04-10 NOTE — PLAN OF CARE
Goal Outcome Evaluation:  Patient was given clear liquids between 2200 and midnight after receiving new order by Dr. Munoz, patient tolerated without vomiting and took evening pills, npo after midnight for MRCP today, no complaints of pain voiced thus far this shift

## 2023-04-10 NOTE — CONSULTS
INITIAL CONSULT NOTE      Patient Name: Kingsley Hunt  : 1940  MRN: [id number]  Primary Care Physician: Amanda Quinones DO  Date of admission: 2023    Patient Care Team:  Amanda Quinones DO as PCP - General (Family Medicine)  Marbin Lawrence MD as Consulting Physician (Cardiac Electrophysiology)  Dara Means MD as Consulting Physician (Gastroenterology)  Jake Glynn OD (Optometry)        Reason for Consult:       Hyponatremia*  Subjective   History of Present Illness:   Chief Complaint:   Chief Complaint   Patient presents with   • Abdominal Pain   • Vomiting     After eating felt like food is stuck at top of stomach, took omeprazole and baking soda, having severe pain in abd now     HISTORY:  Kingsley Hunt is a 82 y.o. female with past medical history of atrial fibrillation who presents with abdominal pain she went to [place] [facility] found to have distended gallbladder with gallstones.  Ultrasound consistent with cholecystitis patient liver enzymes were elevated.  Sodium level Renin on 128 131.  Other labs are acceptable.  Creatinine found to be 0.96 this morning is 1.02.  BNP level is elevated.  No echocardiogram available but patient to have history of atrial fibrillation seen by cardiologist Dr. Benson gaytan while back also found to have a ferritin greater than 100,000          Review of systems:  All other review of system unremarkable  Constitutional: No fever, no chills, no lethargy, no weakness.  HEENT:  No headache, otalgia, itchy eyes, nasal discharge or sore throat.  Cardiac:  No chest pain, dyspnea, orthopnea or PND.  Chest:              No cough, phlegm or wheezing.  Abdomen:  Significant abdominal pain nausea vomiting.  Neuro:  No focal weakness, abnormal movements orseizure like activity.  :   No hematuria, no pyuria, no dysuria, no flank pain.  ROS was otherwise negative except as mentioned in the [place].       Personal History:     Past Medical History:   Past  Medical History:   Diagnosis Date   • Arthritis    • BCC     Nose   • CHOL    • COPD 12/08/2014   • Diverticulosis    • GERD    • HTN    • Hypothyroid        Surgical History:      Past Surgical History:   Procedure Laterality Date   • APPENDECTOMY     • CATARACT EXTRACTION WITH INTRAOCULAR LENS IMPLANT Bilateral    • COLONOSCOPY     • ENDOSCOPY     • SKIN BIOPSY      Nose   • WRIST FRACTURE SURGERY Left 2003       Family History: family history includes Brain cancer in her mother; COPD in her father; Diabetes in her sister and son; Heart disease in her brother and sister; No Known Problems in her brother. Otherwise pertinent FHx was reviewed and unremarkable.     Social History:  reports that she quit smoking about 30 years ago. Her smoking use included cigarettes. She has a 10.00 pack-year smoking history. She has been exposed to tobacco smoke. She has never used smokeless tobacco. She reports current alcohol use of about 3.0 standard drinks per week. She reports that she does not use drugs.    Medications:  Prior to Admission medications    Medication Sig Start Date End Date Taking? Authorizing Provider   acebutolol (SECTRAL) 200 MG capsule Take 1 capsule by mouth Daily. 3/20/23  Yes Amanda Quinones DO   apixaban (ELIQUIS) 5 MG tablet tablet Take 1 tablet by mouth Every 12 (Twelve) Hours. 3/20/23  Yes Amanda Quinones DO   famotidine (PEPCID) 20 MG tablet Take 1 tablet by mouth Every Other Day. Alternate w/ omeprazole   Yes Beth Del Rosario MD   losartan-hydrochlorothiazide (Hyzaar) 100-12.5 MG per tablet Take 1 tablet by mouth Daily. 3/20/23  Yes Amanda Quinones DO   omeprazole (priLOSEC) 20 MG capsule Take 1 capsule by mouth Every Other Day. Alternate w/ famotidine 2/1/23  Yes Beth Del Rosario MD   polyethylene glycol (MiraLax) 17 g packet Take 17 g by mouth Daily.   Yes Beth Del Rosario MD   b complex vitamins capsule Take 1 capsule by mouth Daily. 3/20/23 4/10/23 Yes Amanda Quinones DO    calcium carbonate (TUMS) 500 MG chewable tablet Chew 1 tablet Daily.  4/10/23 Yes Beth Del Rosario MD   levothyroxine (SYNTHROID, LEVOTHROID) 50 MCG tablet Take 1 tablet by mouth Every Night. 3/26/23 4/10/23 Yes Amanda Quinones, DO   albuterol sulfate  (90 Base) MCG/ACT inhaler Inhale 1 puff Every 4 (Four) Hours As Needed for Shortness of Air or Wheezing. 1/26/23   Beth Del Rosario MD   B Complex Vitamins (VITAMIN B COMPLEX PO) Take  by mouth Daily. Take vitamin B6 / B12 liquid as directed    Beth Del Roasrio MD   levothyroxine (SYNTHROID, LEVOTHROID) 50 MCG tablet Take 1 tablet by mouth Daily.    Beth Del Rosario MD   vitamin D3 125 MCG (5000 UT) capsule capsule Take 1 capsule by mouth Daily.    Beth Del Rosario MD   Accu-Chek Guide test strip test BLOOD SUGAR AT least TWICE A WEEK 10/14/21 4/10/23  Beth Del Rosario MD     Scheduled Meds:losartan, 100 mg, Oral, Q24H   And  hydroCHLOROthiazide, 12.5 mg, Oral, Q24H  levothyroxine, 50 mcg, Oral, Nightly  pantoprazole, 40 mg, Intravenous, Q AM  piperacillin-tazobactam, 3.375 g, Intravenous, Q8H  polyethylene glycol, 17 g, Oral, BID  sucralfate, 1 g, Oral, 4x Daily AC & at Bedtime  vitamin D3, 5,000 Units, Oral, Daily      Continuous Infusions:   PRN Meds:•  albuterol  •  magnesium sulfate **OR** magnesium sulfate **OR** magnesium sulfate  •  Morphine  •  ondansetron  •  potassium & sodium phosphates **OR** potassium & sodium phosphates  •  potassium chloride  •  potassium chloride  •  sodium chloride  Allergies:  No Known Allergies    Objective   Exam:     Vital Signs  Temp:  [97.9 °F (36.6 °C)-98.7 °F (37.1 °C)] 98.6 °F (37 °C)  Heart Rate:  [74-89] 82  Resp:  [12-15] 14  BP: ()/(60-76) 146/76  SpO2:  [92 %-97 %] 96 %  on   ;   Device (Oxygen Therapy): room air  Body mass index is 29.7 kg/m².  EXAM  General: Elderly white  female looks younger than her age in no acute distress.    Head:      Normocephalic and atraumatic.     Eyes:      PERRL/EOM intact, conjunctivae and sclerae clear without nystagmus.    Neck:      No masses, thyromegaly,  trachea central   Lungs:    Clear bilaterally to auscultation.    Heart:      Irregular rate and rhythm, no murmur no gallop  Abd:        Soft, nontender, not distended, bowel sounds positive, no shifting dullness.  Msk:        No deformity or scoliosis noted of thoracic or lumbar spine.    Pulses:   Pulses normal in all 4 extremities.    Extremities:        No cyanosis or clubbing--mild edema.    Neuro:    No focal deficits.   alert oriented x3  Skin:       Intact without lesions or rashes.    Psych:    Alert and cooperative; normal mood and affect; normal attention span       Results Review:  I have personally reviewed most recent Data :  [facility]  CBC    Results from last 7 days   Lab Units 04/09/23 2325 04/09/23 1138 04/08/23  1856   WBC 10*3/mm3 9.80 12.20* 8.25   HEMOGLOBIN g/dL 11.8* 11.9* 13.7   PLATELETS 10*3/mm3 234 235 284     CMP   Results from last 7 days   Lab Units 04/09/23 2325 04/09/23 1138 04/08/23  1856 04/05/23  1437   SODIUM mmol/L 131* 133* 128*  --    POTASSIUM mmol/L 3.6 3.5 4.6 3.9   CHLORIDE mmol/L 93* 94* 88*  --    CO2 mmol/L 28.0 29.0 30.5*  --    BUN mg/dL 11 12 11  --    CREATININE mg/dL 1.02* 0.96 0.88  --    GLUCOSE mg/dL 139* 161* 170*  --    ALBUMIN g/dL 3.7 3.7 4.4  --    BILIRUBIN mg/dL 4.0* 3.6* 1.7*  --    ALK PHOS U/L 173* 174* 134*  --    AST (SGOT) U/L 530* 829* 166*  --    ALT (SGPT) U/L 629* 774* 84*  --    LIPASE U/L  --   --  76*  --      ABG      CT Abdomen Pelvis With Contrast    Result Date: 4/8/2023  1. Cholelithiasis. The gallbladder is distended. Consider a follow-up right upper quadrant ultrasound for better characterization as determined clinically. 2. Hepatomegaly with hepatic steatosis. 3. Diverticulosis. 4. Small hiatal hernia. 5. Additional incidental and chronic findings as above. Slot 63 Electronically signed by:   Sheldon Gandhi M.D.  4/8/2023 6:41 PM Mountain Time    US Gallbladder    Result Date: 4/9/2023  1.  Distended gallbladder. Cholelithiasis. Correlate for cholecystitis. 2.  Lobular echogenic material in the gallbladder lumen without flow, most compatible with sludge. 3.  Fatty liver Electronically signed by:  Shantell Murillo M.D.  4/8/2023 10:38 PM Mountain Time            Assessment & Plan   Assessment and Plan:         Cholecystitis    Hyperlipidemia    Chronic obstructive pulmonary disease    Diverticulosis    Hypertension    Permanent atrial fibrillation    ASSESSMENT:  • Hyponatremia  • Acute cholecystitis  • Elevated liver enzymes because of the gallstones  • CKD stage IIIa  • Hypertension  • Atrial fibrillation  • COPD          PLAN :     · Etiology of hyponatremia likely related to underlying liver problem at this time and acute sickness that might be causing some increased ADH effect.  Also contributing factor is the use of thiazide diuretics that might be causing some increase renal salt wasting  · May need cardiology clearance before the surgery   · Patient may go for cholecystectomy later today and also ERCP to be done   · follow-up with the labs tomorrow morning  · Follow-up with a urine studies     Harshil Mcfadden MD  [facility]  4/10/2023  10:28 EDT

## 2023-04-10 NOTE — PROGRESS NOTES
General Surgery Inpatient Progress Note      Name: Kingsley Hunt ADMIT: 2023   : 1940  PCP: Amanda Quinones DO    MRN: [id number] LOS: 1 days   AGE/SEX: 82 y.o. female  ROOM: [address] [place] [address]      Patient Care Team:  Amanda Quinones DO as PCP - General (Family Medicine)  Marbin Lawrence MD as Consulting Physician (Cardiac Electrophysiology)  Dara Means MD as Consulting Physician (Gastroenterology)  Jake Glynn OD (Optometry)  Chief Complaint   Patient presents with   • Abdominal Pain   • Vomiting     After eating felt like food is stuck at top of stomach, took omeprazole and baking soda, having severe pain in abd now       Subjective:  Patient seen and examined and feeling better today.  MRCP did not show choledocholithiasis.    Medications:  losartan, 100 mg, Oral, Q24H   And  hydroCHLOROthiazide, 12.5 mg, Oral, Q24H  levothyroxine, 50 mcg, Oral, Nightly  pantoprazole, 40 mg, Intravenous, Q AM  piperacillin-tazobactam, 3.375 g, Intravenous, Q8H  polyethylene glycol, 17 g, Oral, BID  sucralfate, 1 g, Oral, 4x Daily AC & at Bedtime  vitamin D3, 5,000 Units, Oral, Daily       •  albuterol  •  magnesium sulfate **OR** magnesium sulfate **OR** magnesium sulfate  •  Morphine  •  ondansetron  •  potassium & sodium phosphates **OR** potassium & sodium phosphates  •  potassium chloride  •  potassium chloride  •  sodium chloride     Vitals:  Temp:  [97.9 °F (36.6 °C)-98.7 °F (37.1 °C)] 98.1 °F (36.7 °C)  Heart Rate:  [74-89] 89  Resp:  [12-15] 14  BP: ()/(60-67) 105/65     Physical Exam:  No acute distress, alert  Nonlabored respirations  Abdomen soft, nondistended, mildly tender to palpation in upper abdomen  Extremities warm and well-perfused with no gross deformities    Labs:  Results from last 7 days   Lab Units 23  2325 23  1138 23  1856   WBC 10*3/mm3 9.80 12.20* 8.25   HEMOGLOBIN g/dL 11.8* 11.9* 13.7   HEMATOCRIT % 33.9* 34.4 39.1   PLATELETS  10*3/mm3 234 235 284     Results from last 7 days   Lab Units 04/09/23  2325 04/09/23  1138 04/08/23  1856   SODIUM mmol/L 131* 133* 128*   POTASSIUM mmol/L 3.6 3.5 4.6   CHLORIDE mmol/L 93* 94* 88*   CO2 mmol/L 28.0 29.0 30.5*   BUN mg/dL 11 12 11   CREATININE mg/dL 1.02* 0.96 0.88   CALCIUM mg/dL 9.5 9.2 10.3   BILIRUBIN mg/dL 4.0* 3.6* 1.7*   ALK PHOS U/L 173* 174* 134*   ALT (SGPT) U/L 629* 774* 84*   AST (SGOT) U/L 530* 829* 166*   GLUCOSE mg/dL 139* 161* 170*     Imaging:  MRCP 4/10/2023  Impression:  1. Cholelithiasis. No evidence of choledocholithiasis. Common bile duct caliber measures 8 mm, which is within normal limits for the patient's stated age.  2. Trace fluid is seen surrounding the gallbladder, which may represent changes of cholecystitis.  3. 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  4. 2.5 cm periampullary duodenal diverticulum.    Assessment and Plan:  82 y.o. female with acute onset abdominal pain, elevated transaminases, alk phos, bilirubin and imaging showing cholelithiasis.  MRCP negative for choledocholithiasis and with trace pericholecystic fluid.    - Suspect patient passed a stone resulting in lab abnormalities  - Given note choledocholithiasis can plan to proceed with cholecystectomy  -  Continue to hold Eliquis for surgical intervention  - Given symptoms and that patient likely passed a stone cholecystectomy this admission is recommended  - The surgery along with associated risk, benefits, alternatives were discussed with the patient; she expressed understanding and is agreeable  - Plan for cholecystectomy tomorrow    This note was created using Dragon Voice Recognition software.    Sondra Munoz MD  04/10/23  07:42 EDT

## 2023-04-10 NOTE — PROGRESS NOTES
LOS: 1 day   Patient Care Team:  Amanda Quinones DO as PCP - General (Family Medicine)  Marbin Lawrence MD as Consulting Physician (Cardiac Electrophysiology)  Dara Means MD as Consulting Physician (Gastroenterology)  Jake Glynn OD (Optometry)      Subjective     Interval History:     Subjective: Patient is lethargic as she was recently given Ativan.  MRCP negative for choledocholithiasis.      ROS:   No chest pain, shortness of breath, or cough.        Medication Review:     Current Facility-Administered Medications:   •  albuterol (PROVENTIL) nebulizer solution 0.083% 2.5 mg/3mL, 2.5 mg, Nebulization, Q4H PRN, Katie Tompkins APRN  •  levothyroxine (SYNTHROID, LEVOTHROID) tablet 50 mcg, 50 mcg, Oral, Nightly, Katie Tompkins APRN, 50 mcg at 04/09/23 2210  •  LORazepam (ATIVAN) injection 0.25 mg, 0.25 mg, Intravenous, Q4H PRN, Elliott Sneed MD, 0.25 mg at 04/10/23 1210  •  losartan (COZAAR) tablet 100 mg, 100 mg, Oral, Q24H, 100 mg at 04/09/23 0803 **AND** [DISCONTINUED] hydroCHLOROthiazide (HYDRODIURIL) tablet 12.5 mg, 12.5 mg, Oral, Q24H, Katie Tompkins APRN, 12.5 mg at 04/09/23 0803  •  Magnesium Sulfate - Total Dose 10 grams - Magnesium 1 or Less, 2 g, Intravenous, PRN **OR** Magnesium Sulfate - Total Dose 6 grams - Magnesium 1.1 - 1.5, 2 g, Intravenous, PRN **OR** Magnesium Sulfate - Total Dose 4 grams - Magnesium 1.6 - 1.9, 4 g, Intravenous, PRN, Elliott Sneed MD  •  morphine injection 2 mg, 2 mg, Intravenous, Q4H PRN, Elliott Sneed MD  •  naloxone (NARCAN) injection 0.4 mg, 0.4 mg, Intravenous, Q5 Min PRN, Elliott Sneed MD  •  ondansetron (ZOFRAN) injection 4 mg, 4 mg, Intravenous, Q6H PRN, Katie Tompkins, APRN, 4 mg at 04/09/23 2211  •  pantoprazole (PROTONIX) injection 40 mg, 40 mg, Intravenous, Q AM, Naomi Dick, APRN, 40 mg at 04/10/23 0572  •  piperacillin-tazobactam (ZOSYN) IVPB 3.375 g in 100 mL NS (CD), 3.375 g, Intravenous, Q8H,  Katie Tompkins APRN, 3.375 g at 04/10/23 0949  •  polyethylene glycol (MIRALAX) packet 17 g, 17 g, Oral, BID, Naomi Dick APRN, 17 g at 04/09/23 2210  •  potassium & sodium phosphates (PHOS-NAK) 280-160-250 MG packet - for Phosphorus less than 1.25 mg/dL, 2 packet, Oral, Q6H PRN **OR** potassium & sodium phosphates (PHOS-NAK) 280-160-250 MG packet - for Phosphorus 1.25 - 2.5 mg/dL, 2 packet, Oral, Q6H PRN, Elliott Sneed MD  •  potassium chloride (K-DUR,KLOR-CON) CR tablet 40 mEq, 40 mEq, Oral, PRN, Elliott Sneed MD  •  potassium chloride (KLOR-CON) packet 40 mEq, 40 mEq, Oral, PRN, Elliott Sneed MD  •  sodium chloride 0.9 % flush 10 mL, 10 mL, Intravenous, PRN, Emergency, Triage Protocol, MD, 10 mL at 04/09/23 0541  •  sucralfate (CARAFATE) tablet 1 g, 1 g, Oral, 4x Daily AC & at Bedtime, Naomi Dick APRN, 1 g at 04/09/23 2210  •  vitamin D3 capsule 5,000 Units, 5,000 Units, Oral, Daily, Katie Tompkins APRN, 5,000 Units at 04/09/23 0803      Objective     Vital Signs  Vitals:    04/09/23 2100 04/10/23 0055 04/10/23 0931 04/10/23 1222   BP: 101/63 105/65 146/76 118/71   BP Location: Left arm Left arm Left arm Left arm   Patient Position: Lying Lying Lying Lying   Pulse: 80 89 82 78   Resp: 14 14 14 14   Temp: 98.7 °F (37.1 °C) 98.1 °F (36.7 °C) 98.6 °F (37 °C) 98.6 °F (37 °C)   TempSrc: Oral Oral Oral Oral   SpO2: 93% 92% 96% 92%   Weight:       Height:           Physical Exam:     General Appearance:    Awake and alert, in no acute distress, lethargic   Head:    Normocephalic, without obvious abnormality   Eyes:          Conjunctivae normal, anicteric sclera   Throat:   No oral lesions, no thrush, oral mucosa moist   Neck:   No adenopathy, supple, no JVD   Lungs:     respirations regular, even and unlabored   Abdomen:     Soft, mild upper abdominal tenderness, no rebound or guarding, non-distended   Rectal:     Deferred   Extremities:   No edema, no cyanosis   Skin:   No bruising or rash,  no jaundice        Results Review:    CBC    Results from last 7 days   Lab Units 04/09/23  2325 04/09/23  1138 04/08/23  1856   WBC 10*3/mm3 9.80 12.20* 8.25   HEMOGLOBIN g/dL 11.8* 11.9* 13.7   PLATELETS 10*3/mm3 234 235 284     CMP   Results from last 7 days   Lab Units 04/09/23  2325 04/09/23  1138 04/08/23  1856 04/05/23  1437   SODIUM mmol/L 131* 133* 128*  --    POTASSIUM mmol/L 3.6 3.5 4.6 3.9   CHLORIDE mmol/L 93* 94* 88*  --    CO2 mmol/L 28.0 29.0 30.5*  --    BUN mg/dL 11 12 11  --    CREATININE mg/dL 1.02* 0.96 0.88  --    GLUCOSE mg/dL 139* 161* 170*  --    ALBUMIN g/dL 3.7 3.7 4.4  --    BILIRUBIN mg/dL 4.0* 3.6* 1.7*  --    ALK PHOS U/L 173* 174* 134*  --    AST (SGOT) U/L 530* 829* 166*  --    ALT (SGPT) U/L 629* 774* 84*  --    MAGNESIUM mg/dL 1.8 1.8  --   --    PHOSPHORUS mg/dL 4.0 4.4  --   --    LIPASE U/L  --   --  76*  --      Cr Clearance Estimated Creatinine Clearance: 44.7 mL/min (A) (by C-G formula based on SCr of 1.02 mg/dL (H)).  Coag     HbA1C   Lab Results   Component Value Date    HGBA1C 6.00 (H) 04/05/2023    HGBA1C 6.20 (H) 03/20/2023     Blood Glucose No results found for: POCGLU  Infection   Results from last 7 days   Lab Units 04/09/23  0120   BLOODCX  No growth at 24 hours  No growth at 24 hours     UA    Results from last 7 days   Lab Units 04/08/23  2328   NITRITE UA  Negative     Radiology(recent) CT Abdomen Pelvis With Contrast    Result Date: 4/8/2023  1. Cholelithiasis. The gallbladder is distended. Consider a follow-up right upper quadrant ultrasound for better characterization as determined clinically. 2. Hepatomegaly with hepatic steatosis. 3. Diverticulosis. 4. Small hiatal hernia. 5. Additional incidental and chronic findings as above. Slot 63 Electronically signed by:  Sheldon Gandhi M.D.  4/8/2023 6:41 PM Mountain Time    US Gallbladder    Result Date: 4/9/2023  1.  Distended gallbladder. Cholelithiasis. Correlate for cholecystitis. 2.  Lobular echogenic material  in the gallbladder lumen without flow, most compatible with sludge. 3.  Fatty liver Electronically signed by:  Shantell Murillo M.D.  4/8/2023 10:38 PM Mountain Time    MRI abdomen w wo contrast mrcp    Result Date: 4/10/2023  Impression: 1. Cholelithiasis. No evidence of choledocholithiasis. Common bile duct caliber measures 8 mm, which is within normal limits for the patient's stated age. 2. Trace fluid is seen surrounding the gallbladder, which may represent changes of cholecystitis. 3. 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 4. 2.5 cm periampullary duodenal diverticulum. Electronically Signed: Mercy Starr  4/10/2023 1:38 PM EDT  Workstation ID: [id number]         Assessment & Plan     ASSESSMENT:  -Elevated LFTs   -Abdominal pain  -Nausea and vomiting  -Atrial fibrillation on Eliquis  -History of constipation  -GERD  -Diverticulosis with a history of diverticulitis  -Diabetes  -Hyponatremia  -Leukocytosis  -Normocytic anemia  -COPD  -Hypertension    PLAN:  Patient is an 82-year-old female with history of diabetes, COPD, and hypertension who presented on 4/8 with complaints of abdominal pain and nausea/vomiting.    MRCP negative for choledocholithiasis with a normal CBD.  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  Repeat hepatic function panel is pending.  Await results.  I suspect that the patient passed a CBD stone causing elevated LFTs.  General surgery is following and are considering laparoscopic cholecystectomy tomorrow.  Could consider intraoperative cholangiogram to further rule out CBD stone.  We will start low-fat diet.  N.p.o. after midnight for possible cholecystectomy tomorrow.  Antiemetics/analgesics as needed.  Continue PPI and Carafate.  Continue antibiotics.  Continue supportive care.      Electronically signed by SAILAJA Bowen, 04/10/23, 2:16 PM EDT.

## 2023-04-10 NOTE — PROGRESS NOTES
[facility] Medicine Services Daily Progress Note    Patient Name: Kingsley Hunt  : 1940  MRN: [id number]  Primary Care Physician:  Amanda Quinones DO  Date of admission: 2023      Subjective      Chief Complaint: Abdominal pain nausea vomiting      Patient Reports no further abdominal pain.  MRCP done results pending.  GI following.  Patient has been anxious.  Ativan ordered small dose IV.    ROS negative except as above      Objective      Vitals:   Temp:  [98.1 °F (36.7 °C)-98.7 °F (37.1 °C)] 98.6 °F (37 °C)  Heart Rate:  [74-89] 78  Resp:  [14-15] 14  BP: ()/(60-76) 118/71    Physical Exam  Vitals reviewed.   HENT:      Head: Normocephalic.      Nose: Nose normal.      Mouth/Throat:      Mouth: Mucous membranes are moist.   Cardiovascular:      Rate and Rhythm: Normal rate and regular rhythm.      Pulses: Normal pulses.      Heart sounds: Normal heart sounds.   Pulmonary:      Effort: Pulmonary effort is normal.      Breath sounds: Normal breath sounds.   Abdominal:      General: Abdomen is flat.      Palpations: Abdomen is soft.   Musculoskeletal:         General: Normal range of motion.      Cervical back: Normal range of motion.   Skin:     General: Skin is warm.   Neurological:      General: No focal deficit present.      Mental Status: She is alert and oriented to person, place, and time.   Psychiatric:         Mood and Affect: Mood normal.         Behavior: Behavior normal.           Result Review    Result Review:  I have personally reviewed the results from the time of this admission to 4/10/2023 15:24 EDT and agree with these findings:  [x]  Laboratory  []  Microbiology  []  Radiology  []  EKG/Telemetry   []  Cardiology/Vascular   []  Pathology  []  Old records  []  Other:  Most notable findings include: Liver function tests improving but still quite elevated 400-500 range        Assessment & Plan      Brief Patient Summary:  Kingsley Hunt is a 82 y.o. female  who presents with cholecystitis choledocholithiasis      levothyroxine, 50 mcg, Oral, Nightly  losartan, 100 mg, Oral, Q24H  pantoprazole, 40 mg, Intravenous, Q AM  piperacillin-tazobactam, 3.375 g, Intravenous, Q8H  polyethylene glycol, 17 g, Oral, BID  sucralfate, 1 g, Oral, 4x Daily AC & at Bedtime  vitamin D3, 5,000 Units, Oral, Daily             Active Hospital Problems:  Active Hospital Problems    Diagnosis    • **Cholecystitis    • Permanent atrial fibrillation    • Hyperlipidemia    • Chronic obstructive pulmonary disease    • Diverticulosis    • Hypertension      Plan:   Cholecystitis  With associated vomiting  Continue ondansetron  General surgery consulted  Gastroenterology consulted  Continue Zosyn  MRCP done.  Results pending.  Follow-up GI and surgery recommendations.  Continue pain control    Permanent atrial fibrillation  Chronic  Atrial fibrillation on EKG  Patient on home Eliquis-hold for now  Continuous cardiac monitoring and pulse oximetry      Hypertension  Chronic/stable   Normally controlled   Continue home losartan-hydrochlorothiazide  Vital signs per policy     • I discussed the patient's findings and my recommendations with patient and family.     VTE Prophylaxis - SCDs.  Code Status - Full code.    DVT prophylaxis:  No DVT prophylaxis order currently exists.    CODE STATUS:         Disposition:  I expect patient to be discharged wed 4/12.    This patient has been examined wearing appropriate Personal Protective Equipment and discussed with pt and family. 04/10/23      Electronically signed by Elliott Sneed MD, 04/10/23, 15:24 EDT.  [facility] Hospitalist Team

## 2023-04-11 ENCOUNTER — ANESTHESIA (OUTPATIENT)
Dept: PERIOP | Facility: HOSPITAL | Age: 83
DRG: 418 | End: 2023-04-11
Payer: MEDICARE

## 2023-04-11 ENCOUNTER — ANESTHESIA EVENT (OUTPATIENT)
Dept: PERIOP | Facility: HOSPITAL | Age: 83
DRG: 418 | End: 2023-04-11
Payer: MEDICARE

## 2023-04-11 LAB
CREAT UR-MCNC: 169.7 MG/DL
LKM-1 AB SER-ACNC: 1 UNITS (ref 0–20)
MITOCHONDRIA M2 IGG SER-ACNC: <20 UNITS (ref 0–20)
PHOSPHATE SERPL-MCNC: 2.3 MG/DL (ref 2.5–4.5)
SMA IGG SER-ACNC: 5 UNITS (ref 0–19)

## 2023-04-11 PROCEDURE — 25010000002 PIPERACILLIN SOD-TAZOBACTAM PER 1 G: Performed by: NURSE PRACTITIONER

## 2023-04-11 PROCEDURE — 25010000002 FENTANYL CITRATE (PF) 100 MCG/2ML SOLUTION: Performed by: ANESTHESIOLOGY

## 2023-04-11 PROCEDURE — 83735 ASSAY OF MAGNESIUM: CPT | Performed by: SURGERY

## 2023-04-11 PROCEDURE — 8E0W4CZ ROBOTIC ASSISTED PROCEDURE OF TRUNK REGION, PERCUTANEOUS ENDOSCOPIC APPROACH: ICD-10-PCS | Performed by: SURGERY

## 2023-04-11 PROCEDURE — 25010000002 HEPARIN (PORCINE) PER 1000 UNITS: Performed by: SURGERY

## 2023-04-11 PROCEDURE — S2900 ROBOTIC SURGICAL SYSTEM: HCPCS | Performed by: SURGERY

## 2023-04-11 PROCEDURE — 0FT44ZZ RESECTION OF GALLBLADDER, PERCUTANEOUS ENDOSCOPIC APPROACH: ICD-10-PCS | Performed by: SURGERY

## 2023-04-11 PROCEDURE — 25010000002 PIPERACILLIN SOD-TAZOBACTAM PER 1 G: Performed by: SURGERY

## 2023-04-11 PROCEDURE — 80053 COMPREHEN METABOLIC PANEL: CPT | Performed by: SURGERY

## 2023-04-11 PROCEDURE — 88304 TISSUE EXAM BY PATHOLOGIST: CPT | Performed by: SURGERY

## 2023-04-11 PROCEDURE — 25010000002 HYDROMORPHONE 1 MG/ML SOLUTION: Performed by: ANESTHESIOLOGY

## 2023-04-11 PROCEDURE — 25010000002 PROPOFOL 200 MG/20ML EMULSION: Performed by: ANESTHESIOLOGY

## 2023-04-11 PROCEDURE — 85025 COMPLETE CBC W/AUTO DIFF WBC: CPT | Performed by: SURGERY

## 2023-04-11 PROCEDURE — 47562 LAPAROSCOPIC CHOLECYSTECTOMY: CPT | Performed by: SURGERY

## 2023-04-11 PROCEDURE — 84100 ASSAY OF PHOSPHORUS: CPT | Performed by: INTERNAL MEDICINE

## 2023-04-11 PROCEDURE — 84100 ASSAY OF PHOSPHORUS: CPT | Performed by: SURGERY

## 2023-04-11 PROCEDURE — 25010000002 LORAZEPAM PER 2 MG: Performed by: ANESTHESIOLOGY

## 2023-04-11 DEVICE — CLIP LIGAT VASC HORIZON TI MD/LG GRN 6CT: Type: IMPLANTABLE DEVICE | Site: ABDOMEN | Status: FUNCTIONAL

## 2023-04-11 RX ORDER — SORBITOL SOLUTION 70 %
50 SOLUTION, ORAL MISCELLANEOUS ONCE
Status: DISCONTINUED | OUTPATIENT
Start: 2023-04-11 | End: 2023-04-13 | Stop reason: HOSPADM

## 2023-04-11 RX ORDER — LIDOCAINE HYDROCHLORIDE 20 MG/ML
INJECTION, SOLUTION EPIDURAL; INFILTRATION; INTRACAUDAL; PERINEURAL AS NEEDED
Status: DISCONTINUED | OUTPATIENT
Start: 2023-04-11 | End: 2023-04-11 | Stop reason: SURG

## 2023-04-11 RX ORDER — LORAZEPAM 2 MG/ML
1 INJECTION INTRAMUSCULAR ONCE
Status: COMPLETED | OUTPATIENT
Start: 2023-04-11 | End: 2023-04-11

## 2023-04-11 RX ORDER — BISACODYL 10 MG
10 SUPPOSITORY, RECTAL RECTAL DAILY
Status: DISCONTINUED | OUTPATIENT
Start: 2023-04-11 | End: 2023-04-13 | Stop reason: HOSPADM

## 2023-04-11 RX ORDER — INDOCYANINE GREEN AND WATER 25 MG
2.5 KIT INJECTION ONCE
Status: COMPLETED | OUTPATIENT
Start: 2023-04-11 | End: 2023-04-11

## 2023-04-11 RX ORDER — PROPOFOL 10 MG/ML
INJECTION, EMULSION INTRAVENOUS AS NEEDED
Status: DISCONTINUED | OUTPATIENT
Start: 2023-04-11 | End: 2023-04-11 | Stop reason: SURG

## 2023-04-11 RX ORDER — ACETAMINOPHEN 325 MG/1
650 TABLET ORAL EVERY 6 HOURS PRN
Status: DISCONTINUED | OUTPATIENT
Start: 2023-04-11 | End: 2023-04-13 | Stop reason: HOSPADM

## 2023-04-11 RX ORDER — SODIUM CHLORIDE, SODIUM LACTATE, POTASSIUM CHLORIDE, CALCIUM CHLORIDE 600; 310; 30; 20 MG/100ML; MG/100ML; MG/100ML; MG/100ML
1000 INJECTION, SOLUTION INTRAVENOUS CONTINUOUS
Status: CANCELLED | OUTPATIENT
Start: 2023-04-11

## 2023-04-11 RX ORDER — FENTANYL CITRATE 50 UG/ML
50 INJECTION, SOLUTION INTRAMUSCULAR; INTRAVENOUS
Status: DISCONTINUED | OUTPATIENT
Start: 2023-04-11 | End: 2023-04-11 | Stop reason: HOSPADM

## 2023-04-11 RX ORDER — ROCURONIUM BROMIDE 10 MG/ML
INJECTION, SOLUTION INTRAVENOUS AS NEEDED
Status: DISCONTINUED | OUTPATIENT
Start: 2023-04-11 | End: 2023-04-11 | Stop reason: SURG

## 2023-04-11 RX ORDER — FENTANYL CITRATE 50 UG/ML
INJECTION, SOLUTION INTRAMUSCULAR; INTRAVENOUS AS NEEDED
Status: DISCONTINUED | OUTPATIENT
Start: 2023-04-11 | End: 2023-04-11 | Stop reason: SURG

## 2023-04-11 RX ORDER — HEPARIN SODIUM 5000 [USP'U]/ML
5000 INJECTION, SOLUTION INTRAVENOUS; SUBCUTANEOUS EVERY 8 HOURS SCHEDULED
Status: COMPLETED | OUTPATIENT
Start: 2023-04-11 | End: 2023-04-13

## 2023-04-11 RX ORDER — DOCUSATE SODIUM 100 MG/1
100 CAPSULE, LIQUID FILLED ORAL 2 TIMES DAILY
Status: DISCONTINUED | OUTPATIENT
Start: 2023-04-11 | End: 2023-04-13 | Stop reason: HOSPADM

## 2023-04-11 RX ORDER — HYDROCODONE BITARTRATE AND ACETAMINOPHEN 5; 325 MG/1; MG/1
1 TABLET ORAL EVERY 6 HOURS PRN
Status: DISCONTINUED | OUTPATIENT
Start: 2023-04-11 | End: 2023-04-13 | Stop reason: HOSPADM

## 2023-04-11 RX ORDER — LIDOCAINE HYDROCHLORIDE 10 MG/ML
0.5 INJECTION, SOLUTION INFILTRATION; PERINEURAL ONCE AS NEEDED
Status: CANCELLED | OUTPATIENT
Start: 2023-04-11

## 2023-04-11 RX ORDER — SODIUM CHLORIDE 0.9 % (FLUSH) 0.9 %
10 SYRINGE (ML) INJECTION AS NEEDED
Status: CANCELLED | OUTPATIENT
Start: 2023-04-11

## 2023-04-11 RX ORDER — SODIUM CHLORIDE, SODIUM LACTATE, POTASSIUM CHLORIDE, CALCIUM CHLORIDE 600; 310; 30; 20 MG/100ML; MG/100ML; MG/100ML; MG/100ML
INJECTION, SOLUTION INTRAVENOUS CONTINUOUS PRN
Status: DISCONTINUED | OUTPATIENT
Start: 2023-04-11 | End: 2023-04-11 | Stop reason: SURG

## 2023-04-11 RX ORDER — SORBITOL SOLUTION 70 %
50 SOLUTION, ORAL MISCELLANEOUS ONCE
Status: COMPLETED | OUTPATIENT
Start: 2023-04-11 | End: 2023-04-11

## 2023-04-11 RX ORDER — BUPIVACAINE HYDROCHLORIDE 2.5 MG/ML
INJECTION, SOLUTION INFILTRATION; PERINEURAL AS NEEDED
Status: DISCONTINUED | OUTPATIENT
Start: 2023-04-11 | End: 2023-04-11 | Stop reason: HOSPADM

## 2023-04-11 RX ADMIN — PIPERACILLIN SODIUM AND TAZOBACTAM SODIUM 3.38 G: 3; .375 INJECTION, POWDER, LYOPHILIZED, FOR SOLUTION INTRAVENOUS at 08:56

## 2023-04-11 RX ADMIN — DOCUSATE SODIUM 100 MG: 100 CAPSULE, LIQUID FILLED ORAL at 23:02

## 2023-04-11 RX ADMIN — PROPOFOL 150 MG: 10 INJECTION, EMULSION INTRAVENOUS at 19:06

## 2023-04-11 RX ADMIN — FENTANYL CITRATE 50 MCG: 50 INJECTION, SOLUTION INTRAMUSCULAR; INTRAVENOUS at 19:29

## 2023-04-11 RX ADMIN — SUGAMMADEX 200 MG: 100 INJECTION, SOLUTION INTRAVENOUS at 20:28

## 2023-04-11 RX ADMIN — LIDOCAINE HYDROCHLORIDE 30 MG: 20 INJECTION, SOLUTION EPIDURAL; INFILTRATION; INTRACAUDAL; PERINEURAL at 19:06

## 2023-04-11 RX ADMIN — SORBITOL SOLUTION (BULK) 50 ML: 70 SOLUTION at 09:06

## 2023-04-11 RX ADMIN — PIPERACILLIN SODIUM AND TAZOBACTAM SODIUM 3.38 G: 3; .375 INJECTION, POWDER, LYOPHILIZED, FOR SOLUTION INTRAVENOUS at 23:12

## 2023-04-11 RX ADMIN — ROCURONIUM BROMIDE 40 MG: 10 INJECTION, SOLUTION INTRAVENOUS at 19:06

## 2023-04-11 RX ADMIN — HYDROMORPHONE HYDROCHLORIDE 0.5 MG: 1 INJECTION, SOLUTION INTRAMUSCULAR; INTRAVENOUS; SUBCUTANEOUS at 21:02

## 2023-04-11 RX ADMIN — PIPERACILLIN SODIUM AND TAZOBACTAM SODIUM 3.38 G: 3; .375 INJECTION, POWDER, LYOPHILIZED, FOR SOLUTION INTRAVENOUS at 16:42

## 2023-04-11 RX ADMIN — POLYETHYLENE GLYCOL 3350 17 G: 17 POWDER, FOR SOLUTION ORAL at 08:55

## 2023-04-11 RX ADMIN — HEPARIN SODIUM 5000 UNITS: 5000 INJECTION INTRAVENOUS; SUBCUTANEOUS at 23:02

## 2023-04-11 RX ADMIN — LORAZEPAM 1 MG: 2 INJECTION INTRAMUSCULAR; INTRAVENOUS at 16:49

## 2023-04-11 RX ADMIN — HYDROCODONE BITARTRATE AND ACETAMINOPHEN 1 TABLET: 5; 325 TABLET ORAL at 23:02

## 2023-04-11 RX ADMIN — FENTANYL CITRATE 50 MCG: 50 INJECTION, SOLUTION INTRAMUSCULAR; INTRAVENOUS at 19:38

## 2023-04-11 RX ADMIN — Medication 5000 UNITS: at 08:55

## 2023-04-11 RX ADMIN — PANTOPRAZOLE SODIUM 40 MG: 40 INJECTION, POWDER, LYOPHILIZED, FOR SOLUTION INTRAVENOUS at 06:17

## 2023-04-11 RX ADMIN — INDOCYANINE GREEN AND WATER 2.5 MG: KIT at 13:06

## 2023-04-11 RX ADMIN — FENTANYL CITRATE 50 MCG: 50 INJECTION, SOLUTION INTRAMUSCULAR; INTRAVENOUS at 20:04

## 2023-04-11 RX ADMIN — LOSARTAN POTASSIUM 100 MG: 50 TABLET, FILM COATED ORAL at 08:55

## 2023-04-11 RX ADMIN — PIPERACILLIN SODIUM AND TAZOBACTAM SODIUM 3.38 G: 3; .375 INJECTION, POWDER, LYOPHILIZED, FOR SOLUTION INTRAVENOUS at 00:45

## 2023-04-11 RX ADMIN — SUCRALFATE 1 G: 1 TABLET ORAL at 08:55

## 2023-04-11 RX ADMIN — FENTANYL CITRATE 50 MCG: 50 INJECTION, SOLUTION INTRAMUSCULAR; INTRAVENOUS at 19:06

## 2023-04-11 RX ADMIN — SODIUM CHLORIDE, SODIUM LACTATE, POTASSIUM CHLORIDE, AND CALCIUM CHLORIDE: .6; .31; .03; .02 INJECTION, SOLUTION INTRAVENOUS at 19:07

## 2023-04-11 NOTE — ANESTHESIA PROCEDURE NOTES
Airway  Urgency: elective    Date/Time: 4/11/2023 7:07 PM    General Information and Staff    Patient location during procedure: OR  Anesthesiologist: Franki Canada MD    Indications and Patient Condition  Indications for airway management: airway protection    Preoxygenated: yes  Mask difficulty assessment: 1 - vent by mask    Final Airway Details  Final airway type: endotracheal airway      Successful airway: ETT  Cuffed: yes   Successful intubation technique: direct laryngoscopy and video laryngoscopy  Blade: Loren  Blade size: 3  ETT size (mm): 7.0  Cormack-Lehane Classification: grade IIa - partial view of glottis  Measured from: teeth  ETT/EBT  to teeth (cm): 21  Number of attempts at approach: 2  Assessment: lips, teeth, and gum same as pre-op and atraumatic intubation

## 2023-04-11 NOTE — PROGRESS NOTES
PROGRESS NOTE      Patient Name: Kingsley Hunt  : 1940  MRN: [id number]  Primary Care Physician: Amanda Quinones DO  Date of admission: 2023    Patient Care Team:  Amanda Quinones DO as PCP - General (Family Medicine)  Marbin Lawrence MD as Consulting Physician (Cardiac Electrophysiology)  Dara Means MD as Consulting Physician (Gastroenterology)  Jake Glynn OD (Optometry)        Subjective   Subjective:     Feeling slightly better but still having significant abdominal pain  Review of systems:  All other review of system unremarkable      Allergies:  No Known Allergies    Objective   Exam:     Vital Signs  Temp:  [97.6 °F (36.4 °C)-98.8 °F (37.1 °C)] 97.7 °F (36.5 °C)  Heart Rate:  [75-89] 77  Resp:  [15-20] 15  BP: (104-139)/(67-79) 117/71  SpO2:  [93 %-96 %] 95 %  on  Flow (L/min):  [1] 1;   Device (Oxygen Therapy): room air  Body mass index is 29.7 kg/m².    General: Elderly white seems younger than her age  female in no acute distress.    Head:      Normocephalic and atraumatic.    Eyes:      PERRL/EOM intact, conjunctiva and sclera clear with out nystagmus.    Neck:      No masses, thyromegaly,  trachea central with normal respiratory effort   Lungs:    Clear bilaterally to auscultation.    Heart:      Regular rate and rhythm, no murmur no gallop  Abd:        Soft, nontender, not distended, bowel sounds positive, no shifting dullness   Pulses:   Pulses palpable  Extr:        No cyanosis or clubbing--no significant edema.    Neuro:    No focal deficits.   alert oriented x3  Skin:       Intact without lesions or rashes.    Psych:    Alert and cooperative; normal mood and affect; .      Results Review:  I have personally reviewed most recent Data :  CBC    Results from last 7 days   Lab Units 04/10/23  2245 23  2325 23  1138 23  1856   WBC 10*3/mm3 6.00 9.80 12.20* 8.25   HEMOGLOBIN g/dL 11.1* 11.8* 11.9* 13.7   PLATELETS 10*3/mm3 208 234 235 284      CMP   Results from last 7 days   Lab Units 04/10/23  2245 04/10/23  0906 04/09/23  2325 04/09/23  1138 04/08/23  1856 04/05/23  1437   SODIUM mmol/L 134*  --  131* 133* 128*  --    POTASSIUM mmol/L 3.6  --  3.6 3.5 4.6 3.9   CHLORIDE mmol/L 98  --  93* 94* 88*  --    CO2 mmol/L 27.0  --  28.0 29.0 30.5*  --    BUN mg/dL 14  --  11 12 11  --    CREATININE mg/dL 1.06*  --  1.02* 0.96 0.88  --    GLUCOSE mg/dL 125*  --  139* 161* 170*  --    ALBUMIN g/dL 3.4* 3.7 3.7 3.7 4.4  --    BILIRUBIN mg/dL 2.5* 3.2* 4.0* 3.6* 1.7*  --    ALK PHOS U/L 167* 163* 173* 174* 134*  --    AST (SGOT) U/L 251* 415* 530* 829* 166*  --    ALT (SGPT) U/L 412* 563* 629* 774* 84*  --    LIPASE U/L  --   --   --   --  76*  --      ABG      MRI abdomen w wo contrast mrcp    Result Date: 4/10/2023  Impression: 1. Cholelithiasis. No evidence of choledocholithiasis. Common bile duct caliber measures 8 mm, which is within normal limits for the patient's stated age. 2. Trace fluid is seen surrounding the gallbladder, which may represent changes of cholecystitis. 3. 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 4. 2.5 cm periampullary duodenal diverticulum. Electronically Signed: Mercy Starr  4/10/2023 1:38 PM EDT  Workstation ID: [id number]        Scheduled Meds:bisacodyl, 10 mg, Rectal, Daily  levothyroxine, 50 mcg, Oral, Nightly  losartan, 100 mg, Oral, Q24H  pantoprazole, 40 mg, Intravenous, Q AM  piperacillin-tazobactam, 3.375 g, Intravenous, Q8H  polyethylene glycol, 17 g, Oral, BID  sorbitol, 50 mL, Oral, Once  sucralfate, 1 g, Oral, 4x Daily AC & at Bedtime  vitamin D3, 5,000 Units, Oral, Daily      Continuous Infusions:   PRN Meds:•  albuterol  •  LORazepam  •  magnesium sulfate **OR** magnesium sulfate **OR** magnesium sulfate  •  naloxone  •  ondansetron  •  potassium & sodium phosphates **OR** potassium & sodium phosphates  •  potassium chloride  •  potassium chloride  •  sodium chloride    Assessment & Plan   Assessment and Plan:          Cholecystitis    Hyperlipidemia    Chronic obstructive pulmonary disease    Diverticulosis    Hypertension    Permanent atrial fibrillation    ASSESSMENT:  • Hyponatremia  • Acute cholecystitis  • Elevated liver enzymes because of the gallstones  • CKD stage IIIa  • Hypertension  • Atrial fibrillation  • COPD              PLAN :      • Etiology of hyponatremia likely related to underlying liver problem at this time and acute sickness that might be causing some increased ADH effect.  Also contributing factor is the use of thiazide diuretics that might be causing some increase renal salt wasting  • Sodium level is improving after stopping thiazide diuretics  • Continue to hold hydrochlorothiazide and we should not restart at the time of discharge  • Okay for cholecystectomy  • follow-up with the labs tomorrow morning  • Follow-up with a urine studies   •           Electronically signed by Harshil Mcfadden MD,   [facility] kidney consultant  [phone]  4/11/2023  14:53 EDT

## 2023-04-11 NOTE — ANESTHESIA PREPROCEDURE EVALUATION
Anesthesia Evaluation     Patient summary reviewed and Nursing notes reviewed   NPO Solid Status: > 8 hours             Airway   Mallampati: II  TM distance: >3 FB  Neck ROM: full  No difficulty expected  Dental - normal exam   (+) implants    Pulmonary - normal exam   (+) COPD mild,   Cardiovascular - normal exam    ECG reviewed    (+) hypertension, dysrhythmias Atrial Fib, hyperlipidemia,       Neuro/Psych  GI/Hepatic/Renal/Endo    (+)  GERD,  thyroid problem hypothyroidism    Musculoskeletal     Abdominal  - normal exam    Bowel sounds: normal.   Substance History      OB/GYN          Other   arthritis,                      Anesthesia Plan    ASA 3     general       Anesthetic plan, risks, benefits, and alternatives have been provided, discussed and informed consent has been obtained with: patient.        CODE STATUS:

## 2023-04-11 NOTE — PROGRESS NOTES
[facility] Medicine Services Daily Progress Note    Patient Name: Kingsley Hunt  : 1940  MRN: [id number]  Primary Care Physician:  mAanda Quinones DO  Date of admission: 2023      Subjective    Chief Complaint: Abdominal pain nausea vomiting        Patient Reports no further abdominal pain.    Pending cholecystectomy today.  LFTs coming down.  Denies any chest pain shortness of breath or palpitations.     ROS negative except as above       Objective      Vitals:   Temp:  [97.6 °F (36.4 °C)-98.7 °F (37.1 °C)] 97.9 °F (36.6 °C)  Heart Rate:  [75-85] 83  Resp:  [11-20] 11  BP: (106-149)/(67-79) 149/73  Flow (L/min):  [1] 1    Physical Exam  Vitals reviewed.  Family at bedside  HENT:      Head: Normocephalic.      Nose: Nose normal.      Mouth/Throat:      Mouth: Mucous membranes are moist.   Cardiovascular:      Rate and Rhythm: Normal rate and regular rhythm.      Pulses: Normal pulses.      Heart sounds: Normal heart sounds.   Pulmonary:      Effort: Pulmonary effort is normal.      Breath sounds: Normal breath sounds.   Abdominal:      General: Abdomen is flat.      Palpations: Abdomen is soft.   Musculoskeletal:         General: Normal range of motion.      Cervical back: Normal range of motion.   Skin:     General: Skin is warm.   Neurological:      General: No focal deficit present.      Mental Status: She is alert and oriented to person, place, and time.   Psychiatric:         Mood and Affect: Mood normal.         Behavior: Behavior normal.        Result Review    Result Review:  I have personally reviewed the results from the time of this admission to 2023 17:06 EDT and agree with these findings:  [x]  Laboratory  []  Microbiology  []  Radiology  []  EKG/Telemetry   []  Cardiology/Vascular   []  Pathology  []  Old records  []  Other:  Most notable findings include: Sodium 134 White count 6 phosphorus 2.3         Assessment & Plan       Brief Patient Summary:  Kingsley BARRERA  Gilbert is a 82 y.o. female who presents with cholecystitis choledocholithiasis        levothyroxine, 50 mcg, Oral, Nightly  losartan, 100 mg, Oral, Q24H  pantoprazole, 40 mg, Intravenous, Q AM  piperacillin-tazobactam, 3.375 g, Intravenous, Q8H  polyethylene glycol, 17 g, Oral, BID  sucralfate, 1 g, Oral, 4x Daily AC & at Bedtime  vitamin D3, 5,000 Units, Oral, Daily               Active Hospital Problems:       Active Hospital Problems     Diagnosis     • **Cholecystitis     • Permanent atrial fibrillation     • Hyperlipidemia     • Chronic obstructive pulmonary disease     • Diverticulosis     • Hypertension        Plan:   Cholecystitis  With associated vomiting  Continue ondansetron  General surgery consulted  Gastroenterology consulted  Continue Zosyn  MRCP done.  Results pending.  Follow-up GI and surgery recommendations.  Continue pain control     Permanent atrial fibrillation  Chronic  Atrial fibrillation on EKG  Patient on home Eliquis-hold for now  Continuous cardiac monitoring and pulse oximetry      Hypertension  Chronic/stable   Normally controlled   Continue home losartan-hydrochlorothiazide  Vital signs per policy    Hyponatremia  Multifactorial  Nephrology following  Currently improving at 134     • I discussed the patient's findings and my recommendations with patient and family.     VTE Prophylaxis - SCDs.  Code Status - Full code.     DVT prophylaxis:  No DVT prophylaxis order currently exists.     CODE STATUS:       Disposition:  I expect patient to be discharged wed 4/12.     This patient has been examined wearing appropriate Personal Protective Equipment and discussed with pt and family.   04/11/23      Electronically signed by Elliott Sneed MD, 04/11/23, 17:06 EDT.  [facility] Hospitalist Team

## 2023-04-11 NOTE — CASE MANAGEMENT/SOCIAL WORK
Continued Stay Note  RANGEL Roger     Patient Name: Kingsley Hunt  MRN: [id number]  Today's Date: 4/11/2023    Admit Date: 4/8/2023    Plan: Home. Son or daughter will stay with patient at discharge. Family to transport home at discharge. Surgery today 04/11/23 Lap cholecystectomy   Discharge Plan     Row Name 04/11/23 1410       Plan    Plan Home. Son or daughter will stay with patient at discharge. Family to transport home at discharge. Surgery today 04/11/23 Lap cholecystectomy    Plan Comments DC barriers: Laproscopic cholecystectomy today 04/11/23.             Expected Discharge Date and Time     Expected Discharge Date Expected Discharge Time    Apr 12, 2023             Laney Jane RN   Phone communication or documentation only - no physical contact with patient or family.

## 2023-04-11 NOTE — PLAN OF CARE
Goal Outcome Evaluation:      Patient alert oriented able to make needs known. Patient NPO at MN. Patient on room air oxygen saturation drop in to 80's while asleep, applied 2L/NC.  Bed in low position, call light in reach, room near nursing station.

## 2023-04-12 LAB
ALBUMIN SERPL-MCNC: 3.5 G/DL (ref 3.5–5.2)
ALBUMIN/GLOB SERPL: 1.3 G/DL
ALP SERPL-CCNC: 222 U/L (ref 39–117)
ALT SERPL W P-5'-P-CCNC: 318 U/L (ref 1–33)
ANION GAP SERPL CALCULATED.3IONS-SCNC: 8 MMOL/L (ref 5–15)
AST SERPL-CCNC: 165 U/L (ref 1–32)
BASOPHILS # BLD AUTO: 0 10*3/MM3 (ref 0–0.2)
BASOPHILS NFR BLD AUTO: 0.2 % (ref 0–1.5)
BILIRUB SERPL-MCNC: 1.8 MG/DL (ref 0–1.2)
BUN SERPL-MCNC: 8 MG/DL (ref 8–23)
BUN/CREAT SERPL: 11.3 (ref 7–25)
CALCIUM SPEC-SCNC: 9.3 MG/DL (ref 8.6–10.5)
CHLORIDE SERPL-SCNC: 100 MMOL/L (ref 98–107)
CO2 SERPL-SCNC: 27 MMOL/L (ref 22–29)
CREAT SERPL-MCNC: 0.71 MG/DL (ref 0.57–1)
DEPRECATED RDW RBC AUTO: 48.1 FL (ref 37–54)
EGFRCR SERPLBLD CKD-EPI 2021: 84 ML/MIN/1.73
EOSINOPHIL # BLD AUTO: 0.1 10*3/MM3 (ref 0–0.4)
EOSINOPHIL NFR BLD AUTO: 1.6 % (ref 0.3–6.2)
ERYTHROCYTE [DISTWIDTH] IN BLOOD BY AUTOMATED COUNT: 13.5 % (ref 12.3–15.4)
GLOBULIN UR ELPH-MCNC: 2.6 GM/DL
GLUCOSE SERPL-MCNC: 130 MG/DL (ref 65–99)
HCT VFR BLD AUTO: 36.4 % (ref 34–46.6)
HGB BLD-MCNC: 12.7 G/DL (ref 12–15.9)
LYMPHOCYTES # BLD AUTO: 0.5 10*3/MM3 (ref 0.7–3.1)
LYMPHOCYTES NFR BLD AUTO: 7.2 % (ref 19.6–45.3)
MAGNESIUM SERPL-MCNC: 1.7 MG/DL (ref 1.6–2.4)
MCH RBC QN AUTO: 33.7 PG (ref 26.6–33)
MCHC RBC AUTO-ENTMCNC: 34.8 G/DL (ref 31.5–35.7)
MCV RBC AUTO: 96.8 FL (ref 79–97)
MONOCYTES # BLD AUTO: 0.5 10*3/MM3 (ref 0.1–0.9)
MONOCYTES NFR BLD AUTO: 7.1 % (ref 5–12)
NEUTROPHILS NFR BLD AUTO: 5.8 10*3/MM3 (ref 1.7–7)
NEUTROPHILS NFR BLD AUTO: 83.9 % (ref 42.7–76)
NRBC BLD AUTO-RTO: 0 /100 WBC (ref 0–0.2)
PHOSPHATE SERPL-MCNC: 2.4 MG/DL (ref 2.5–4.5)
PLATELET # BLD AUTO: 223 10*3/MM3 (ref 140–450)
PMV BLD AUTO: 8.4 FL (ref 6–12)
POTASSIUM SERPL-SCNC: 3.7 MMOL/L (ref 3.5–5.2)
PROT SERPL-MCNC: 6.1 G/DL (ref 6–8.5)
RBC # BLD AUTO: 3.76 10*6/MM3 (ref 3.77–5.28)
SODIUM SERPL-SCNC: 135 MMOL/L (ref 136–145)
WBC NRBC COR # BLD: 6.9 10*3/MM3 (ref 3.4–10.8)

## 2023-04-12 PROCEDURE — 25010000002 PIPERACILLIN SOD-TAZOBACTAM PER 1 G: Performed by: SURGERY

## 2023-04-12 PROCEDURE — 25010000002 HEPARIN (PORCINE) PER 1000 UNITS: Performed by: SURGERY

## 2023-04-12 RX ORDER — HYDROCODONE BITARTRATE AND ACETAMINOPHEN 5; 325 MG/1; MG/1
1 TABLET ORAL EVERY 6 HOURS PRN
Qty: 15 TABLET | Refills: 0 | Status: SHIPPED | OUTPATIENT
Start: 2023-04-12 | End: 2023-04-17

## 2023-04-12 RX ORDER — SORBITOL SOLUTION 70 %
50 SOLUTION, ORAL MISCELLANEOUS ONCE
Status: DISCONTINUED | OUTPATIENT
Start: 2023-04-12 | End: 2023-04-13 | Stop reason: HOSPADM

## 2023-04-12 RX ORDER — SORBITOL SOLUTION 70 %
50 SOLUTION, ORAL MISCELLANEOUS ONCE
Status: COMPLETED | OUTPATIENT
Start: 2023-04-12 | End: 2023-04-12

## 2023-04-12 RX ADMIN — Medication 10 ML: at 20:01

## 2023-04-12 RX ADMIN — HEPARIN SODIUM 5000 UNITS: 5000 INJECTION INTRAVENOUS; SUBCUTANEOUS at 22:18

## 2023-04-12 RX ADMIN — DOCUSATE SODIUM 100 MG: 100 CAPSULE, LIQUID FILLED ORAL at 08:27

## 2023-04-12 RX ADMIN — HEPARIN SODIUM 5000 UNITS: 5000 INJECTION INTRAVENOUS; SUBCUTANEOUS at 06:06

## 2023-04-12 RX ADMIN — PIPERACILLIN SODIUM AND TAZOBACTAM SODIUM 3.38 G: 3; .375 INJECTION, POWDER, LYOPHILIZED, FOR SOLUTION INTRAVENOUS at 08:27

## 2023-04-12 RX ADMIN — PANTOPRAZOLE SODIUM 40 MG: 40 INJECTION, POWDER, LYOPHILIZED, FOR SOLUTION INTRAVENOUS at 06:06

## 2023-04-12 RX ADMIN — SORBITOL SOLUTION (BULK) 50 ML: 70 SOLUTION at 15:04

## 2023-04-12 RX ADMIN — DOCUSATE SODIUM 100 MG: 100 CAPSULE, LIQUID FILLED ORAL at 20:01

## 2023-04-12 RX ADMIN — POLYETHYLENE GLYCOL 3350 17 G: 17 POWDER, FOR SOLUTION ORAL at 08:27

## 2023-04-12 RX ADMIN — SUCRALFATE 1 G: 1 TABLET ORAL at 12:23

## 2023-04-12 RX ADMIN — SUCRALFATE 1 G: 1 TABLET ORAL at 20:01

## 2023-04-12 RX ADMIN — LOSARTAN POTASSIUM 100 MG: 50 TABLET, FILM COATED ORAL at 08:27

## 2023-04-12 RX ADMIN — LEVOTHYROXINE SODIUM 50 MCG: 0.05 TABLET ORAL at 20:01

## 2023-04-12 RX ADMIN — HYDROCODONE BITARTRATE AND ACETAMINOPHEN 1 TABLET: 5; 325 TABLET ORAL at 06:13

## 2023-04-12 RX ADMIN — HEPARIN SODIUM 5000 UNITS: 5000 INJECTION INTRAVENOUS; SUBCUTANEOUS at 15:18

## 2023-04-12 RX ADMIN — SUCRALFATE 1 G: 1 TABLET ORAL at 08:27

## 2023-04-12 RX ADMIN — POLYETHYLENE GLYCOL 3350 17 G: 17 POWDER, FOR SOLUTION ORAL at 20:01

## 2023-04-12 RX ADMIN — Medication 5000 UNITS: at 08:27

## 2023-04-12 RX ADMIN — SUCRALFATE 1 G: 1 TABLET ORAL at 17:58

## 2023-04-12 NOTE — CASE MANAGEMENT/SOCIAL WORK
Continued Stay Note  RANGEL Roger     Patient Name: Kingsley Hunt  MRN: [id number]  Today's Date: 4/12/2023    Admit Date: 4/8/2023    Plan: Home. Son or daughter will stay with patient at discharge. Family to transport home at discharge   Discharge Plan     Row Name 04/12/23 1611       Plan    Plan Home. Son or daughter will stay with patient at discharge. Family to transport home at discharge    Plan Comments DC barriers: POD #1 laproscopic cholecystectomy., monitoring renal numbers           Expected Discharge Date and Time     Expected Discharge Date Expected Discharge Time    Apr 13, 2023             Laney Jane, MORENITA   Phone communication or documentation only - no physical contact with patient or family.

## 2023-04-12 NOTE — OP NOTE
Operative Report    Patient Name:  Kingsley Hunt  YOB: 1940    Date of Surgery:  4/11/2023    Pre-op Diagnosis:   Symptomatic cholelithiasis, cholecystitis       Post-Op Diagnosis:  Same    Procedure(s):  Robotic assisted laparoscopic cholecystectomy    Staff:  Surgeon(s):  Sondra Munoz MD    Circulator: Mis Lyle, RN; Brenna Angelo RN; Mara Garcia RN  Scrub Person: Kaela Gonzalez; Astrid Sequeira  Assistant: Ezra Mccormick CSFA  was responsible for performing the following activities: Suturing, Closing, Placing Dressing and Held/Positioned Camera and their skilled assistance was necessary for the success of this case.    Anesthesia: General    Estimated Blood Loss: 10 cc    Implants:  None    Specimen:  Gallbladder and contents      Findings: Cholelithiasis, gallbladder wall edema consistent with cholecystitis, critical view obtained    Complications: None immediate    Clinical Indications: The patient is a an 82 year old female who presented to the emergency department with upper abdominal pain and abnormal CMP.  Imaging showed cholelithiasis.  Due to elevated transaminases, alkaline phosphatase, and bilirubin and MRCP was ordered to evaluate for possible choledocholithiasis.  MRCP was negative for cholelithiasis.  The patient likely passed a stone causing the symptoms as well as the lab abnormalities.  Cholecystectomy was recommended.  The surgery along with the associated risks, benefits, and alternatives were discussed with the patient. The risks include but are not limited to bleeding, infection, hernia, conversion to open, and common bile duct injury requiring additional procedures. The patient expressed understanding and wished to proceed. Informed consent was obtained.    Description of Procedure:  The patient was brought to the operating room and placed in the supine position with both arms out. Bilateral sequential compression stockings placed on the lower  extremities. The patient was induced and intubated by the Anesthesia service. Perioperative antibiotics were administered. The patient's abdomen was then prepped and draped in the usual sterile fashion. A time out was performed.    After confirming with anesthesia that an orogastric tube was in place and to suction we made a small stab incision in the left upper quadrant at [facility]. A water drop test was performed indicating we were likely intra-abdominal. Insufflation was initiated and a low opening pressure reassured us that we were intra-abdominal. We placed an 8mm robotic trocar just above the umbilicus. The camera was introduced and the abdomen was inspected to ensure we had not caused any injuries with placement of the veress needle or the initial trocar. Under direct visualization we placed a trocar one handbreadth to the left of the umbilicus and another one handbreath to the right of the umbilicus. A fourth 8mm robotic trocar was placed in the left lateral subcostal region. The patient was placed in revere trendelenburg position with the right side up. The robot was docked and the camera and instruments loaded and positioned under direct visualization. At this point I sat down at the console to begin the dissection. The fundus of the gallbladder was retracted cephalad and laterally. Peritoneal attachments were taken down staying high on the gallbladder and away from the common bile duct. The cystic artery and cystic duct were dissected out and the critical view was obtained. The patient had been administered indocyanine green prior to the start of the case and using Firefly technology we were able to see the common bile duct and confirm that biliary anatomy. The cystic artery and duct were clipped and divided. The gallbladder was then dissected from the gallbladder fossa with electrocautery.  There was a small amount of pericholecystic fluid/edema noted during the dissection.  The gallbladder fossa  was inspected and was hemostatic. The clips were inspected and were in good position.  The gallbladder was placed in an endocatch bag through the port to the left of the umbilicus.  The port was removed and the gallbladder was then removed.  The fascial incision did have to be bluntly enlarged to allow removal of the gallbladder.  The fascial incision was then closed with a simple transfascial stitch using the laparoscopic closure device and 0 Vicryl suture.  The fascial closure was checked and it appeared well closed.  The robot was then undocked.  All of the ports were removed under direct visualization except for the port in the midline and all were hemostatic. Pneumoperitoneum was released and the supraumbilical trocar was removed.  The incisions were closed with 4-0 vicryl, cleaned, and dressed with sterile dressings.    The patient tolerated the procedure well.  She was awakened and extubated by anesthesia and then transferred to the recovery room in stable condition. At the completion of the case, all instrument, needle, and sponge counts were correct.    Sondra Munoz MD     Date: 4/12/2023  Time: 15:28 EDT    This note was created using Dragon Voice Recognition software.

## 2023-04-12 NOTE — PROGRESS NOTES
PROGRESS NOTE      Patient Name: Kingsley Hunt  : 1940  MRN: [id number]  Primary Care Physician: Amanda Quinones DO  Date of admission: 2023    Patient Care Team:  Amanda Quinones DO as PCP - General (Family Medicine)  Marbin Lawrence MD as Consulting Physician (Cardiac Electrophysiology)  Dara Means MD as Consulting Physician (Gastroenterology)  Jake Glynn OD (Optometry)        Subjective   Subjective:     Feeling slightly better but still having significant abdominal pain  Review of systems:  All other review of system unremarkable      Allergies:  No Known Allergies    Objective   Exam:     Vital Signs  Temp:  [97.2 °F (36.2 °C)-98.3 °F (36.8 °C)] 98.3 °F (36.8 °C)  Heart Rate:  [70-95] 70  Resp:  [10-15] 15  BP: (120-176)/(73-93) 139/84  SpO2:  [93 %-100 %] 95 %  on  Flow (L/min):  [2] 2;   Device (Oxygen Therapy): room air  Body mass index is 29.7 kg/m².    General: Elderly white seems younger than her age  female in no acute distress.    Head:      Normocephalic and atraumatic.    Eyes:      PERRL/EOM intact, conjunctiva and sclera clear with out nystagmus.    Neck:      No masses, thyromegaly,  trachea central with normal respiratory effort   Lungs:    Clear bilaterally to auscultation.    Heart:      Regular rate and rhythm, no murmur no gallop  Abd:        Soft, nontender, not distended, bowel sounds positive, no shifting dullness   Pulses:   Pulses palpable  Extr:        No cyanosis or clubbing--no significant edema.    Neuro:    No focal deficits.   alert oriented x3  Skin:       Intact without lesions or rashes.    Psych:    Alert and cooperative; normal mood and affect; .      Results Review:  I have personally reviewed most recent Data :  CBC    Results from last 7 days   Lab Units 23  2308 04/10/23  2245 23  2325 23  1138 23  1856   WBC 10*3/mm3 6.90 6.00 9.80 12.20* 8.25   HEMOGLOBIN g/dL 12.7 11.1* 11.8* 11.9* 13.7   PLATELETS  10*3/mm3 223 208 234 235 284     CMP   Results from last 7 days   Lab Units 04/11/23  2308 04/10/23  2245 04/10/23  0906 04/09/23  2325 04/09/23  1138 04/08/23  1856   SODIUM mmol/L 135* 134*  --  131* 133* 128*   POTASSIUM mmol/L 3.7 3.6  --  3.6 3.5 4.6   CHLORIDE mmol/L 100 98  --  93* 94* 88*   CO2 mmol/L 27.0 27.0  --  28.0 29.0 30.5*   BUN mg/dL 8 14  --  11 12 11   CREATININE mg/dL 0.71 1.06*  --  1.02* 0.96 0.88   GLUCOSE mg/dL 130* 125*  --  139* 161* 170*   ALBUMIN g/dL 3.5 3.4* 3.7 3.7 3.7 4.4   BILIRUBIN mg/dL 1.8* 2.5* 3.2* 4.0* 3.6* 1.7*   ALK PHOS U/L 222* 167* 163* 173* 174* 134*   AST (SGOT) U/L 165* 251* 415* 530* 829* 166*   ALT (SGPT) U/L 318* 412* 563* 629* 774* 84*   LIPASE U/L  --   --   --   --   --  76*     ABG      No radiology results for the last day      Scheduled Meds:[START ON 4/13/2023] apixaban, 5 mg, Oral, Q12H  bisacodyl, 10 mg, Rectal, Daily  docusate sodium, 100 mg, Oral, BID  heparin (porcine), 5,000 Units, Subcutaneous, Q8H  levothyroxine, 50 mcg, Oral, Nightly  losartan, 100 mg, Oral, Q24H  pantoprazole, 40 mg, Intravenous, Q AM  polyethylene glycol, 17 g, Oral, BID  sorbitol, 50 mL, Oral, Once  sorbitol, 50 mL, Oral, Once  sucralfate, 1 g, Oral, 4x Daily AC & at Bedtime  vitamin D3, 5,000 Units, Oral, Daily      Continuous Infusions:   PRN Meds:•  acetaminophen  •  albuterol  •  HYDROcodone-acetaminophen  •  magnesium sulfate **OR** magnesium sulfate **OR** magnesium sulfate  •  naloxone  •  ondansetron  •  potassium & sodium phosphates **OR** potassium & sodium phosphates  •  potassium chloride  •  potassium chloride  •  sodium chloride    Assessment & Plan   Assessment and Plan:         Cholecystitis    Hyperlipidemia    Chronic obstructive pulmonary disease    Diverticulosis    Hypertension    Permanent atrial fibrillation    ASSESSMENT:  • Hyponatremia  • Acute cholecystitis  • Elevated liver enzymes because of the gallstones  • CKD stage IIIa  • Hypertension  • Atrial  fibrillation  • COPD              PLAN :      • Etiology of hyponatremia likely related to underlying liver problem at this time and acute sickness that might be causing some increased ADH effect.  Also contributing factor is the use of thiazide diuretics that might be causing some increase renal salt wasting  • Sodium level is improving after stopping thiazide diuretics  • Continue to hold hydrochlorothiazide and we should not restart at the time of discharge  • Losartan restarted okay to continue as at home medication  • Okay for cholecystectomy  • follow-up with the labs tomorrow morning  • Follow-up with a urine studies   •           Electronically signed by Harshil Mcfadden MD,   [facility] kidney consultant  [phone]  4/12/2023  16:10 EDT

## 2023-04-12 NOTE — PROGRESS NOTES
General Surgery Inpatient Progress Note      Name: Kingsley Hunt ADMIT: 2023   : 1940  PCP: Amanda Quinones DO    MRN: [id number] LOS: 3 days   AGE/SEX: 82 y.o. female  ROOM: [address] [place] [address]      Patient Care Team:  Amanda Quinones DO as PCP - General (Family Medicine)  Marbin Lawrence MD as Consulting Physician (Cardiac Electrophysiology)  Dara Means MD as Consulting Physician (Gastroenterology)  Jake Glynn OD (Optometry)  Chief Complaint   Patient presents with   • Abdominal Pain   • Vomiting     After eating felt like food is stuck at top of stomach, took omeprazole and baking soda, having severe pain in abd now       Subjective:  Patient seen and examined this AM.  No acute events overnight.  Pain controlled.  No N/V.  Still extremely drowsy from anesthesia and having a hard time staying awake.    Medications:  [START ON 2023] apixaban, 5 mg, Oral, Q12H  bisacodyl, 10 mg, Rectal, Daily  docusate sodium, 100 mg, Oral, BID  heparin (porcine), 5,000 Units, Subcutaneous, Q8H  levothyroxine, 50 mcg, Oral, Nightly  losartan, 100 mg, Oral, Q24H  pantoprazole, 40 mg, Intravenous, Q AM  polyethylene glycol, 17 g, Oral, BID  sorbitol, 50 mL, Oral, Once  sucralfate, 1 g, Oral, 4x Daily AC & at Bedtime  vitamin D3, 5,000 Units, Oral, Daily       •  acetaminophen  •  albuterol  •  HYDROcodone-acetaminophen  •  magnesium sulfate **OR** magnesium sulfate **OR** magnesium sulfate  •  naloxone  •  ondansetron  •  potassium & sodium phosphates **OR** potassium & sodium phosphates  •  potassium chloride  •  potassium chloride  •  sodium chloride     Vitals:  Temp:  [97.2 °F (36.2 °C)-98.3 °F (36.8 °C)] 98 °F (36.7 °C)  Heart Rate:  [75-95] 75  Resp:  [10-15] 14  BP: (117-176)/(71-93) 120/74     Physical Exam:  NAD, alert  Nonlabored respirations  Abdomen soft, ND, appropriately TTP, incisions c/d/i with dressings in place    Labs:  Results from last 7 days   Lab Units  04/11/23  2308 04/10/23  2245 04/09/23  2325   WBC 10*3/mm3 6.90 6.00 9.80   HEMOGLOBIN g/dL 12.7 11.1* 11.8*   HEMATOCRIT % 36.4 31.9* 33.9*   PLATELETS 10*3/mm3 223 208 234     Results from last 7 days   Lab Units 04/11/23  2308 04/10/23  2245 04/10/23  0906 04/09/23  2325   SODIUM mmol/L 135* 134*  --  131*   POTASSIUM mmol/L 3.7 3.6  --  3.6   CHLORIDE mmol/L 100 98  --  93*   CO2 mmol/L 27.0 27.0  --  28.0   BUN mg/dL 8 14  --  11   CREATININE mg/dL 0.71 1.06*  --  1.02*   CALCIUM mg/dL 9.3 9.0  --  9.5   BILIRUBIN mg/dL 1.8* 2.5* 3.2* 4.0*   ALK PHOS U/L 222* 167* 163* 173*   ALT (SGPT) U/L 318* 412* 563* 629*   AST (SGOT) U/L 165* 251* 415* 530*   GLUCOSE mg/dL 130* 125*  --  139*     Assessment and Plan:  82 y.o. female POD #1 s/p robotic assisted laparoscopic cholecystectomy, doing well.    -PRN pain control  -Low fat diet  -LFTs, alk phos, bili continues to downtrend  -When more alert okay to d/c from surgical standpoint    This note was created using Dragon Voice Recognition software.    Sondra Munoz MD  04/12/23  12:05 EDT

## 2023-04-12 NOTE — PLAN OF CARE
Goal Outcome Evaluation:  Plan of Care Reviewed With: patient        Progress: improving  Outcome Evaluation: Alert and oriented, room air, ambulates to bathroom.  Tolerating GI soft diet.  Possible d/c tomorrow

## 2023-04-12 NOTE — DISCHARGE INSTRUCTIONS
Call the office to schedule followup appointment approx 2 wks postop  May shower soap and water over steristrips but no baths/soaking x 2 weeks  Low fat diet x 2 wks  No lifting >10-15 lbs x 2 wks  Over the counter stool softener twice daily until off narcotics and having regular bowel movements  Milk of magnesia as needed if still constipated with stool softeners  May restart eliquis on 4/13/23

## 2023-04-12 NOTE — NURSING NOTE
VSS on room air today. Pt NPO after clear liquid breakfast, in prep for Lap Tory per Dr. Munoz. Pt denies c/o pain. Family has remained at bedside.

## 2023-04-12 NOTE — PLAN OF CARE
Goal Outcome Evaluation:  Plan of Care Reviewed With: patient        Progress: no change  Outcome Evaluation: Pt is on room air, VSS pain treated with prn medication, SCDs on. Pt had a lap choley. Pt is resting with call light in reach . Will continue to monitor.

## 2023-04-12 NOTE — PROGRESS NOTES
[facility] Medicine Services Daily Progress Note    Patient Name: Kingsley Hunt  : 1940  MRN: [id number]  Primary Care Physician:  Amanda Quinones DO  Date of admission: 2023      Subjective      Chief Complaint: Abdominal pain nausea vomiting        Patient Reports no further abdominal pain.   S/p cholecystectomy.  Tolerating diet well.  Awaiting another bowel movement postop.  Sorbitol ordered.  Home tomorrow if stable     ROS negative except as above      Objective      Vitals:   Temp:  [97.2 °F (36.2 °C)-98.3 °F (36.8 °C)] 98.3 °F (36.8 °C)  Heart Rate:  [70-95] 70  Resp:  [10-15] 15  BP: (120-176)/(74-93) 139/84  Flow (L/min):  [2] 2  Physical Exam  Vitals reviewed.  Family at bedside.  Eating solid food  HENT:      Head: Normocephalic.      Nose: Nose normal.      Mouth/Throat:      Mouth: Mucous membranes are moist.   Cardiovascular:      Rate and Rhythm: Normal rate and regular rhythm.      Pulses: Normal pulses.      Heart sounds: Normal heart sounds.   Pulmonary:      Effort: Pulmonary effort is normal.      Breath sounds: Normal breath sounds.   Abdominal:      General: Abdomen is flat.      Palpations: Abdomen is soft.   Musculoskeletal:         General: Normal range of motion.      Cervical back: Normal range of motion.   Skin:     General: Skin is warm.   Neurological:      General: No focal deficit present.      Mental Status: She is alert and oriented to person, place, and time.   Psychiatric:         Mood and Affect: Mood normal.         Behavior: Behavior normal.     Result Review    Result Review:  I have personally reviewed the results from the time of this admission to 2023 16:34 EDT and agree with these findings:  [x]  Laboratory  []  Microbiology  []  Radiology  []  EKG/Telemetry   []  Cardiology/Vascular   []  Pathology  []  Old records  []  Other:  Most notable findings include: Phosphorus 2.4    Wounds (last 24 hours)     LDA Wound     Row Name  04/12/23 0845 04/11/23 2159 04/11/23 2128       Wound 04/11/23 1921 abdomen Incision    Wound - Properties Group Placement Date: 04/11/23 -AL Placement Time: 1921 -AL Present on Hospital Admission: N  -AL Location: abdomen  -AL Primary Wound Type: Incision  -AL, x4     Dressing Appearance -- dry;intact;no drainage  -CH dry;intact;no drainage  -AV    Closure Adhesive closure strips;Liquid skin adhesive  -AS Adhesive closure strips;Liquid skin adhesive;Other (Comment)  BA  -CH Adhesive closure strips;Liquid skin adhesive;Other (Comment)  BA  -AV    Base dry  -AS -- --    Retired Wound - Properties Group Placement Date: 04/11/23  -AL Placement Time: 1921 -AL Present on Hospital Admission: N  -AL Location: abdomen  -AL Primary Wound Type: Incision  -AL, x4     Retired Wound - Properties Group Date first assessed: 04/11/23 -AL Time first assessed: 1921 -AL Present on Hospital Admission: N  -AL Location: abdomen  -AL Primary Wound Type: Incision  -AL, x4     Row Name 04/11/23 2113 04/11/23 2058 04/11/23 2043       Wound 04/11/23 1921 abdomen Incision    Wound - Properties Group Placement Date: 04/11/23 -AL Placement Time: 1921 -AL Present on Hospital Admission: N  -AL Location: abdomen  -AL Primary Wound Type: Incision  -AL, x4     Dressing Appearance dry;intact;no drainage  -AV dry;intact;no drainage  -AV dry;intact;no drainage  -AV    Closure Adhesive closure strips;Liquid skin adhesive;Other (Comment)  BA  -AV Adhesive closure strips;Liquid skin adhesive;Other (Comment)  BA  -AV Adhesive closure strips;Liquid skin adhesive;Other (Comment)  BA  -AV    Retired Wound - Properties Group Placement Date: 04/11/23  -AL Placement Time: 1921 -AL Present on Hospital Admission: N  -AL Location: abdomen  -AL Primary Wound Type: Incision  -AL, x4     Retired Wound - Properties Group Date first assessed: 04/11/23  -AL Time first assessed: 1921 -AL Present on Hospital Admission: N  -AL Location: abdomen  -AL Primary Wound  Type: Incision  -AL, x4     Row Name 04/11/23 2021             Wound 04/11/23 1921 abdomen Incision    Wound - Properties Group Placement Date: 04/11/23  -AL Placement Time: 1921 -AL Present on Hospital Admission: N  -AL Location: abdomen  -AL Primary Wound Type: Incision  -AL, x4     Closure Liquid skin adhesive;Sutures;Approximated  x4  -AL      Retired Wound - Properties Group Placement Date: 04/11/23  -AL Placement Time: 1921 -AL Present on Hospital Admission: N  -AL Location: abdomen  -AL Primary Wound Type: Incision  -AL, x4     Retired Wound - Properties Group Date first assessed: 04/11/23  -AL Time first assessed: 1921  -AL Present on Hospital Admission: N  -AL Location: abdomen  -AL Primary Wound Type: Incision  -AL, x4           User Key  (r) = Recorded By, (t) = Taken By, (c) = Cosigned By    Initials Name Provider Type    Yazmin Leija RN Registered Nurse    Yazmin Ruvalcaba RN Registered Nurse    Mara Poe RN Registered Nurse    Jennifer Richey RN Registered Nurse                   Assessment & Plan       Brief Patient Summary:  Kingsley Hunt is a 82 y.o. female who presents with cholecystitis choledocholithiasis        levothyroxine, 50 mcg, Oral, Nightly  losartan, 100 mg, Oral, Q24H  pantoprazole, 40 mg, Intravenous, Q AM  piperacillin-tazobactam, 3.375 g, Intravenous, Q8H  polyethylene glycol, 17 g, Oral, BID  sucralfate, 1 g, Oral, 4x Daily AC & at Bedtime  vitamin D3, 5,000 Units, Oral, Daily               Active Hospital Problems:          Active Hospital Problems     Diagnosis     • **Cholecystitis     • Permanent atrial fibrillation     • Hyperlipidemia     • Chronic obstructive pulmonary disease     • Diverticulosis     • Hypertension        Plan:   Cholecystitis  With associated vomiting  Continue ondansetron  General surgery consulted  Gastroenterology consulted  Continue Zosyn  MRCP done.   cholecystectomy done April 11.  Follow-up GI and surgery  recommendations.  Continue pain control  Tolerating solid diet.  Home tomorrow if stable     Permanent atrial fibrillation  Chronic  Atrial fibrillation on EKG  Patient on home Eliquis-hold for now  Continuous cardiac monitoring and pulse oximetry      Hypertension  Chronic/stable   Normally controlled   Continue home losartan-hydrochlorothiazide  Vital signs per policy     Hyponatremia  Multifactorial  Nephrology following  Currently improving at 134     • I discussed the patient's findings and my recommendations with patient and family.     VTE Prophylaxis - SCDs.  Code Status - Full code.     DVT prophylaxis:  No DVT prophylaxis order currently exists.     CODE STATUS:       Disposition:  I expect patient to be discharged wed 4/12.     This patient has been examined wearing appropriate Personal Protective Equipment and discussed with pt and family.     04/12/23      Electronically signed by Elliott Sneed MD, 04/12/23, 16:34 EDT.  [facility] Hospitalist Team

## 2023-04-12 NOTE — ANESTHESIA POSTPROCEDURE EVALUATION
Patient: Kingsley Hunt    Procedure Summary     Date: 04/11/23 Room / Location: [facility] OR 08 / [facility] MAIN OR    Anesthesia Start: 1859 Anesthesia Stop: 2045    Procedure: CHOLECYSTECTOMY LAPAROSCOPIC WITH DAVINCI ROBOT (Abdomen) Diagnosis:     Surgeons: Sondra Munoz MD Provider: Franki Canada MD    Anesthesia Type: general ASA Status: 3          Anesthesia Type: general    Vitals  Vitals Value Taken Time   /87 04/11/23 2130   Temp 97.7 °F (36.5 °C) 04/11/23 2128   Pulse 88 04/11/23 2129   Resp 12 04/11/23 2128   SpO2 99 % 04/11/23 2129   Vitals shown include unvalidated device data.        Post Anesthesia Care and Evaluation    Patient location during evaluation: PACU  Patient participation: complete - patient participated  Level of consciousness: awake  Pain score: 0  Pain management: adequate  Anesthetic complications: No anesthetic complications  PONV Status: none  Cardiovascular status: acceptable  Respiratory status: acceptable  Hydration status: acceptable

## 2023-04-13 ENCOUNTER — READMISSION MANAGEMENT (OUTPATIENT)
Dept: CALL CENTER | Facility: HOSPITAL | Age: 83
End: 2023-04-13
Payer: MEDICARE

## 2023-04-13 VITALS
BODY MASS INDEX: 29.57 KG/M2 | SYSTOLIC BLOOD PRESSURE: 144 MMHG | WEIGHT: 184 LBS | RESPIRATION RATE: 17 BRPM | HEART RATE: 76 BPM | TEMPERATURE: 98.1 F | OXYGEN SATURATION: 93 % | DIASTOLIC BLOOD PRESSURE: 79 MMHG | HEIGHT: 66 IN

## 2023-04-13 LAB
ALBUMIN SERPL-MCNC: 3.4 G/DL (ref 3.5–5.2)
ALBUMIN/GLOB SERPL: 1.4 G/DL
ALP SERPL-CCNC: 247 U/L (ref 39–117)
ALT SERPL W P-5'-P-CCNC: 232 U/L (ref 1–33)
ANION GAP SERPL CALCULATED.3IONS-SCNC: 10 MMOL/L (ref 5–15)
AST SERPL-CCNC: 103 U/L (ref 1–32)
BASOPHILS # BLD AUTO: 0 10*3/MM3 (ref 0–0.2)
BASOPHILS NFR BLD AUTO: 0.7 % (ref 0–1.5)
BILIRUB SERPL-MCNC: 1.2 MG/DL (ref 0–1.2)
BUN SERPL-MCNC: 8 MG/DL (ref 8–23)
BUN/CREAT SERPL: 11.4 (ref 7–25)
CALCIUM SPEC-SCNC: 9.2 MG/DL (ref 8.6–10.5)
CHLORIDE SERPL-SCNC: 98 MMOL/L (ref 98–107)
CO2 SERPL-SCNC: 26 MMOL/L (ref 22–29)
CREAT SERPL-MCNC: 0.7 MG/DL (ref 0.57–1)
DEPRECATED RDW RBC AUTO: 46.8 FL (ref 37–54)
EGFRCR SERPLBLD CKD-EPI 2021: 85.4 ML/MIN/1.73
EOSINOPHIL # BLD AUTO: 0.3 10*3/MM3 (ref 0–0.4)
EOSINOPHIL NFR BLD AUTO: 4.6 % (ref 0.3–6.2)
ERYTHROCYTE [DISTWIDTH] IN BLOOD BY AUTOMATED COUNT: 13.1 % (ref 12.3–15.4)
GLOBULIN UR ELPH-MCNC: 2.5 GM/DL
GLUCOSE SERPL-MCNC: 116 MG/DL (ref 65–99)
HCT VFR BLD AUTO: 33.3 % (ref 34–46.6)
HGB BLD-MCNC: 11.9 G/DL (ref 12–15.9)
LAB AP CASE REPORT: NORMAL
LYMPHOCYTES # BLD AUTO: 1.2 10*3/MM3 (ref 0.7–3.1)
LYMPHOCYTES NFR BLD AUTO: 17.5 % (ref 19.6–45.3)
MAGNESIUM SERPL-MCNC: 1.5 MG/DL (ref 1.6–2.4)
MCH RBC QN AUTO: 34.5 PG (ref 26.6–33)
MCHC RBC AUTO-ENTMCNC: 35.6 G/DL (ref 31.5–35.7)
MCV RBC AUTO: 97 FL (ref 79–97)
MONOCYTES # BLD AUTO: 1 10*3/MM3 (ref 0.1–0.9)
MONOCYTES NFR BLD AUTO: 14.4 % (ref 5–12)
NEUTROPHILS NFR BLD AUTO: 4.2 10*3/MM3 (ref 1.7–7)
NEUTROPHILS NFR BLD AUTO: 62.8 % (ref 42.7–76)
NRBC BLD AUTO-RTO: 0.2 /100 WBC (ref 0–0.2)
PATH REPORT.FINAL DX SPEC: NORMAL
PATH REPORT.GROSS SPEC: NORMAL
PHOSPHATE SERPL-MCNC: 1.7 MG/DL (ref 2.5–4.5)
PHOSPHATE SERPL-MCNC: 2.4 MG/DL (ref 2.5–4.5)
PLATELET # BLD AUTO: 211 10*3/MM3 (ref 140–450)
PMV BLD AUTO: 8.4 FL (ref 6–12)
POTASSIUM SERPL-SCNC: 3.6 MMOL/L (ref 3.5–5.2)
PROT SERPL-MCNC: 5.9 G/DL (ref 6–8.5)
RBC # BLD AUTO: 3.43 10*6/MM3 (ref 3.77–5.28)
SODIUM SERPL-SCNC: 134 MMOL/L (ref 136–145)
WBC NRBC COR # BLD: 6.6 10*3/MM3 (ref 3.4–10.8)

## 2023-04-13 PROCEDURE — 84100 ASSAY OF PHOSPHORUS: CPT | Performed by: INTERNAL MEDICINE

## 2023-04-13 PROCEDURE — 80053 COMPREHEN METABOLIC PANEL: CPT | Performed by: INTERNAL MEDICINE

## 2023-04-13 PROCEDURE — 83735 ASSAY OF MAGNESIUM: CPT | Performed by: INTERNAL MEDICINE

## 2023-04-13 PROCEDURE — 85025 COMPLETE CBC W/AUTO DIFF WBC: CPT | Performed by: INTERNAL MEDICINE

## 2023-04-13 PROCEDURE — 25010000002 HEPARIN (PORCINE) PER 1000 UNITS: Performed by: SURGERY

## 2023-04-13 PROCEDURE — 25010000002 MAGNESIUM SULFATE 2 GM/50ML SOLUTION: Performed by: SURGERY

## 2023-04-13 RX ORDER — PANTOPRAZOLE SODIUM 40 MG/1
40 TABLET, DELAYED RELEASE ORAL
Status: DISCONTINUED | OUTPATIENT
Start: 2023-04-14 | End: 2023-04-13 | Stop reason: HOSPADM

## 2023-04-13 RX ORDER — BISACODYL 5 MG/1
10 TABLET, DELAYED RELEASE ORAL ONCE
Status: DISCONTINUED | OUTPATIENT
Start: 2023-04-13 | End: 2023-04-13 | Stop reason: HOSPADM

## 2023-04-13 RX ORDER — ACETAMINOPHEN 325 MG/1
650 TABLET ORAL ONCE
Status: DISCONTINUED | OUTPATIENT
Start: 2023-04-13 | End: 2023-04-13 | Stop reason: HOSPADM

## 2023-04-13 RX ORDER — SUCRALFATE 1 G/1
1 TABLET ORAL
Qty: 120 TABLET | Refills: 2 | Status: SHIPPED | OUTPATIENT
Start: 2023-04-13

## 2023-04-13 RX ORDER — DOCUSATE SODIUM 100 MG/1
100 CAPSULE, LIQUID FILLED ORAL 2 TIMES DAILY
Status: DISCONTINUED | OUTPATIENT
Start: 2023-04-13 | End: 2023-04-13

## 2023-04-13 RX ADMIN — POLYETHYLENE GLYCOL 3350 17 G: 17 POWDER, FOR SOLUTION ORAL at 09:06

## 2023-04-13 RX ADMIN — SUCRALFATE 1 G: 1 TABLET ORAL at 09:07

## 2023-04-13 RX ADMIN — APIXABAN 5 MG: 5 TABLET, FILM COATED ORAL at 09:07

## 2023-04-13 RX ADMIN — Medication 5000 UNITS: at 09:07

## 2023-04-13 RX ADMIN — PANTOPRAZOLE SODIUM 40 MG: 40 INJECTION, POWDER, LYOPHILIZED, FOR SOLUTION INTRAVENOUS at 05:13

## 2023-04-13 RX ADMIN — LOSARTAN POTASSIUM 100 MG: 50 TABLET, FILM COATED ORAL at 09:10

## 2023-04-13 RX ADMIN — MAGNESIUM SULFATE HEPTAHYDRATE 2 G: 2 INJECTION, SOLUTION INTRAVENOUS at 09:06

## 2023-04-13 RX ADMIN — DOCUSATE SODIUM 100 MG: 100 CAPSULE, LIQUID FILLED ORAL at 09:07

## 2023-04-13 RX ADMIN — MAGNESIUM SULFATE HEPTAHYDRATE 2 G: 2 INJECTION, SOLUTION INTRAVENOUS at 05:21

## 2023-04-13 RX ADMIN — HEPARIN SODIUM 5000 UNITS: 5000 INJECTION INTRAVENOUS; SUBCUTANEOUS at 05:13

## 2023-04-13 RX ADMIN — Medication 2 PACKET: at 01:36

## 2023-04-13 NOTE — CASE MANAGEMENT/SOCIAL WORK
Case Management Discharge Note      Final Note: HOME    Provided Post Acute Provider List?: N/A  N/A Provider List Comment: denies dc needs    Selected Continued Care - Discharged on 4/13/2023 Admission date: 4/8/2023 - Discharge disposition: Home or Self Care         Transportation Services  Private: Car    Final Discharge Disposition Code: 01 - home or self-care

## 2023-04-13 NOTE — DISCHARGE INSTR - ACTIVITY
As directed by surgeon  No lifting for 2 weeks  No driving while taking narcotics.  No tub baths until incisions are healed

## 2023-04-13 NOTE — DISCHARGE SUMMARY
[facility]             [facility] Medicine Services  DISCHARGE SUMMARY    Patient Name: Kingsley Hunt  : 1940  MRN: [id number]    Discharge condition: Stabilized  Date of Admission: 2023  Discharge Diagnosis: Acute cholecystitis status post lap johnathon  Date of Discharge: 2023  Primary Care Physician: Amanda Quinones DO      Presenting Problem:   Cholecystitis [K81.9]    Active and Resolved Hospital Problems:  Active Hospital Problems    Diagnosis POA   • **Cholecystitis [K81.9] Yes   • Permanent atrial fibrillation [I48.21] Yes   • Hyperlipidemia [E78.5] Yes   • Chronic obstructive pulmonary disease [J44.9] Yes   • Diverticulosis [K57.90] Yes   • Hypertension [I10] Yes      Resolved Hospital Problems   No resolved problems to display.         Hospital Course     Hospital Course:  Kingsley Hunt is a 82 y.o. female who presented to the hospital with abdominal pain nausea vomiting.  She was evaluated extensively by GI and surgery and was found to have cholecystitis.  She was taken to the OR and laparoscopic cholecystectomy was performed successfully.  Patient tolerated the procedure well.  Pain was controlled.  She was tolerating p.o. diet and having bowel movements.  She was cleared for for discharge by surgery.  Patient sent home in stable condition with stable vitals.              Reasons For Change In Medications and Indications for New Medications:      Day of Discharge     Vital Signs:  Temp:  [97.4 °F (36.3 °C)-98.5 °F (36.9 °C)] 98.1 °F (36.7 °C)  Heart Rate:  [72-90] 76  Resp:  [15-17] 17  BP: (138-172)/(64-85) 144/79    Physical Exam:  Physical Exam  Vitals reviewed.   Constitutional:       Comments: Family at bedside   HENT:      Head: Normocephalic.      Nose: Nose normal.      Mouth/Throat:      Mouth: Mucous membranes are moist.   Cardiovascular:      Rate and Rhythm: Normal rate and regular rhythm.      Pulses: Normal pulses.      Heart sounds: Normal heart  sounds.   Pulmonary:      Effort: Pulmonary effort is normal.      Breath sounds: Normal breath sounds.   Abdominal:      General: Abdomen is flat.      Palpations: Abdomen is soft.   Musculoskeletal:         General: Normal range of motion.      Cervical back: Normal range of motion.   Skin:     General: Skin is warm.      Comments: Postcholecystectomy laparoscopy incisions clean dry and intact   Neurological:      General: No focal deficit present.      Mental Status: She is alert and oriented to person, place, and time.   Psychiatric:         Mood and Affect: Mood normal.         Behavior: Behavior normal.            Pertinent  and/or Most Recent Results     LAB RESULTS:      Lab 04/13/23  0001 04/11/23  2308 04/10/23  2245 04/09/23  2325 04/09/23  1138 04/08/23 2324 04/08/23  1856   WBC 6.60 6.90 6.00 9.80 12.20*  --  8.25   HEMOGLOBIN 11.9* 12.7 11.1* 11.8* 11.9*  --  13.7   HEMATOCRIT 33.3* 36.4 31.9* 33.9* 34.4  --  39.1   PLATELETS 211 223 208 234 235  --  284   NEUTROS ABS 4.20 5.80 4.20 8.50* 10.70*  --  6.79   IMMATURE GRANS (ABS)  --   --   --   --   --   --  0.01   LYMPHS ABS 1.20 0.50* 0.80 0.50* 0.80  --  1.18   MONOS ABS 1.00* 0.50 0.70 0.70 0.70  --  0.19   EOS ABS 0.30 0.10 0.20 0.10 0.10  --  0.07   MCV 97.0 96.8 96.5 96.2 95.2  --  93.8   SED RATE  --   --   --  12  --   --   --    CRP  --   --   --  7.92*  --   --   --    LACTATE  --   --   --   --   --  2.4* 2.3*         Lab 04/13/23  0730 04/13/23  0001 04/11/23 2308 04/11/23  0421 04/10/23  2245 04/09/23  2325 04/09/23  1138   SODIUM  --  134* 135*  --  134* 131* 133*   POTASSIUM  --  3.6 3.7  --  3.6 3.6 3.5   CHLORIDE  --  98 100  --  98 93* 94*   CO2  --  26.0 27.0  --  27.0 28.0 29.0   ANION GAP  --  10.0 8.0  --  9.0 10.0 10.0   BUN  --  8 8  --  14 11 12   CREATININE  --  0.70 0.71  --  1.06* 1.02* 0.96   EGFR  --  85.4 84.0  --  51.9* 54.4* 58.5*   GLUCOSE  --  116* 130*  --  125* 139* 161*   CALCIUM  --  9.2 9.3  --  9.0 9.5 9.2    MAGNESIUM  --  1.5* 1.7  --  1.8 1.8 1.8   PHOSPHORUS 2.4* 1.7* 2.4* 2.3*  --  4.0 4.4         Lab 04/13/23  0001 04/11/23  2308 04/10/23  2245 04/10/23  0906 04/09/23  2325 04/09/23  1138 04/08/23  1856   TOTAL PROTEIN 5.9* 6.1 5.8* 6.0 6.0 6.1 7.1   ALBUMIN 3.4* 3.5 3.4* 3.7 3.7 3.7 4.4   GLOBULIN 2.5 2.6 2.4  --  2.3 2.4 2.7   ALT (SGPT) 232* 318* 412* 563* 629* 774* 84*   AST (SGOT) 103* 165* 251* 415* 530* 829* 166*   BILIRUBIN 1.2 1.8* 2.5* 3.2* 4.0* 3.6* 1.7*   INDIRECT BILIRUBIN  --   --   --  0.9  --   --   --    BILIRUBIN DIRECT  --   --   --  2.3*  --   --   --    ALK PHOS 247* 222* 167* 163* 173* 174* 134*   LIPASE  --   --   --   --   --   --  76*         Lab 04/09/23 2325 04/09/23  0121 04/09/23  0026   PROBNP 1,894.0*  --   --    HSTROP T  --  14* 14*             Lab 04/09/23  1138   IRON 39   FERRITIN >100,000.00*         Brief Urine Lab Results  (Last result in the past 365 days)      Color   Clarity   Blood   Leuk Est   Nitrite   Protein   CREAT   Urine HCG        04/10/23 1827             169.7             Microbiology Results (last 10 days)     Procedure Component Value - Date/Time    Blood Culture - Blood, Arm, Right [[id number]]  (Normal) Collected: 04/09/23 0120    Lab Status: Preliminary result Specimen: Blood from Arm, Right Updated: 04/13/23 0130     Blood Culture No growth at 4 days    Narrative:      Less than seven (7) mL's of blood was collected.  Insufficient quantity may yield false negative results.    Blood Culture - Blood, Arm, Left [[id number]]  (Normal) Collected: 04/09/23 0120    Lab Status: Preliminary result Specimen: Blood from Arm, Left Updated: 04/13/23 0130     Blood Culture No growth at 4 days    Narrative:      Less than seven (7) mL's of blood was collected.  Insufficient quantity may yield false negative results.          CT Abdomen Pelvis With Contrast    Result Date: 4/8/2023  Impression: 1. Cholelithiasis. The gallbladder is distended. Consider a follow-up right  upper quadrant ultrasound for better characterization as determined clinically. 2. Hepatomegaly with hepatic steatosis. 3. Diverticulosis. 4. Small hiatal hernia. 5. Additional incidental and chronic findings as above. Slot 63 Electronically signed by:  Sheldon Gandhi M.D.  4/8/2023 6:41 PM Mountain Time    US Gallbladder    Result Date: 4/9/2023  Impression: 1.  Distended gallbladder. Cholelithiasis. Correlate for cholecystitis. 2.  Lobular echogenic material in the gallbladder lumen without flow, most compatible with sludge. 3.  Fatty liver Electronically signed by:  Shantell Murillo M.D.  4/8/2023 10:38 PM Mountain Time    MRI abdomen w wo contrast mrcp    Result Date: 4/10/2023  Impression: Impression: 1. Cholelithiasis. No evidence of choledocholithiasis. Common bile duct caliber measures 8 mm, which is within normal limits for the patient's stated age. 2. Trace fluid is seen surrounding the gallbladder, which may represent changes of cholecystitis. 3. 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 4. 2.5 cm periampullary duodenal diverticulum. Electronically Signed: Mercy Starr  4/10/2023 1:38 PM EDT  Workstation ID: [id number]                  Labs Pending at Discharge:  Pending Labs     Order Current Status    Hemochromatosis Mutation In process    Tissue Pathology Exam In process    Blood Culture - Blood, Arm, Left Preliminary result    Blood Culture - Blood, Arm, Right Preliminary result          Procedures Performed  Procedure(s):  CHOLECYSTECTOMY LAPAROSCOPIC WITH DAVINCI ROBOT         Consults:   Consults     Date and Time Order Name Status Description    4/9/2023 12:32 PM Inpatient Gastroenterology Consult      4/9/2023 10:46 AM Inpatient Nephrology Consult      4/9/2023  4:20 AM Inpatient General Surgery Consult Completed             Discharge Details        Discharge Medications      New Medications      Instructions Start Date   HYDROcodone-acetaminophen 5-325 MG per tablet  Commonly known as: Norco    Take 1 tablet by mouth Every 6 (Six) Hours As Needed for Moderate Pain      sucralfate 1 g tablet  Commonly known as: CARAFATE   1 g, Oral, 4 Times Daily Before Meals & Nightly         Continue These Medications      Instructions Start Date   acebutolol 200 MG capsule  Commonly known as: SECTRAL   200 mg, Oral, Daily      albuterol sulfate  (90 Base) MCG/ACT inhaler  Commonly known as: PROVENTIL HFA;VENTOLIN HFA;PROAIR HFA   Inhale 1 puff Every 4 (Four) Hours As Needed for Shortness of Air or Wheezing.      apixaban 5 MG tablet tablet  Commonly known as: ELIQUIS   5 mg, Oral, Every 12 Hours Scheduled      famotidine 20 MG tablet  Commonly known as: PEPCID   20 mg, Oral, Every Other Day, Alternate w/ omeprazole      levothyroxine 50 MCG tablet  Commonly known as: SYNTHROID, LEVOTHROID   50 mcg, Oral, Daily      losartan-hydrochlorothiazide 100-12.5 MG per tablet  Commonly known as: Hyzaar   1 tablet, Oral, Daily      MiraLax 17 g packet  Generic drug: polyethylene glycol   17 g, Oral, Daily      omeprazole 20 MG capsule  Commonly known as: priLOSEC   20 mg, Oral, Every Other Day, Alternate w/ famotidine      VITAMIN B COMPLEX PO   Oral, Daily, Take vitamin B6 / B12 liquid as directed      vitamin D3 125 MCG (5000 UT) capsule capsule   5,000 Units, Oral, Daily             No Known Allergies      Discharge Disposition:   Home or Self Care    Diet:  Hospital:  Diet Order   Procedures   • Diet: Gastrointestinal Diets; Fat-Restricted; Texture: Regular Texture (IDDSI 7); Fluid Consistency: Thin (IDDSI 0)         Discharge Activity:         CODE STATUS:  There are no questions and answers to display.         Future Appointments   Date Time Provider Department Center   7/17/2023  9:45 AM Amanda Quinones DO [facility]           Time spent on Discharge including face to face service:  45 minutes    This patient has been examined wearing appropriate Personal Protective Equipment and discussed with Surgery and  family. 04/13/23      Signature: Elliott Sneed MD

## 2023-04-13 NOTE — PROGRESS NOTES
PROGRESS NOTE      Patient Name: Kingsley Hnut  : 1940  MRN: [id number]  Primary Care Physician: Amanda Quinones DO  Date of admission: 2023    Patient Care Team:  Amanda Quinones DO as PCP - General (Family Medicine)  Marbin Lawrence MD as Consulting Physician (Cardiac Electrophysiology)  Dara Means MD as Consulting Physician (Gastroenterology)  Jake Glynn OD (Optometry)        Subjective   Subjective:     Feeling slightly better but still having significant abdominal pain  Review of systems:  All other review of system unremarkable      Allergies:  No Known Allergies    Objective   Exam:     Vital Signs  Temp:  [97.4 °F (36.3 °C)-98.5 °F (36.9 °C)] 98.1 °F (36.7 °C)  Heart Rate:  [70-90] 76  Resp:  [15-17] 17  BP: (138-172)/(64-85) 144/79  SpO2:  [93 %-95 %] 93 %  on   ;   Device (Oxygen Therapy): room air  Body mass index is 29.7 kg/m².    General: Elderly white seems younger than her age  female in no acute distress.    Head:      Normocephalic and atraumatic.    Eyes:      PERRL/EOM intact, conjunctiva and sclera clear with out nystagmus.    Neck:      No masses, thyromegaly,  trachea central with normal respiratory effort   Lungs:    Clear bilaterally to auscultation.    Heart:      Regular rate and rhythm, no murmur no gallop  Abd:        Soft, nontender, not distended, bowel sounds positive, no shifting dullness   Pulses:   Pulses palpable  Extr:        No cyanosis or clubbing--no significant edema.    Neuro:    No focal deficits.   alert oriented x3  Skin:       Intact without lesions or rashes.    Psych:    Alert and cooperative; normal mood and affect; .      Results Review:  I have personally reviewed most recent Data :  CBC    Results from last 7 days   Lab Units 23  0001 23  2308 04/10/23  2245 23  2325 23  1138 23  1856   WBC 10*3/mm3 6.60 6.90 6.00 9.80 12.20* 8.25   HEMOGLOBIN g/dL 11.9* 12.7 11.1* 11.8* 11.9* 13.7    PLATELETS 10*3/mm3 211 223 208 234 235 284     CMP   Results from last 7 days   Lab Units 04/13/23  0001 04/11/23  2308 04/10/23  2245 04/10/23  0906 04/09/23  2325 04/09/23  1138 04/08/23  1856   SODIUM mmol/L 134* 135* 134*  --  131* 133* 128*   POTASSIUM mmol/L 3.6 3.7 3.6  --  3.6 3.5 4.6   CHLORIDE mmol/L 98 100 98  --  93* 94* 88*   CO2 mmol/L 26.0 27.0 27.0  --  28.0 29.0 30.5*   BUN mg/dL 8 8 14  --  11 12 11   CREATININE mg/dL 0.70 0.71 1.06*  --  1.02* 0.96 0.88   GLUCOSE mg/dL 116* 130* 125*  --  139* 161* 170*   ALBUMIN g/dL 3.4* 3.5 3.4* 3.7 3.7 3.7 4.4   BILIRUBIN mg/dL 1.2 1.8* 2.5* 3.2* 4.0* 3.6* 1.7*   ALK PHOS U/L 247* 222* 167* 163* 173* 174* 134*   AST (SGOT) U/L 103* 165* 251* 415* 530* 829* 166*   ALT (SGPT) U/L 232* 318* 412* 563* 629* 774* 84*   LIPASE U/L  --   --   --   --   --   --  76*     ABG      No radiology results for the last day      Scheduled Meds:apixaban, 5 mg, Oral, Q12H  bisacodyl, 10 mg, Rectal, Daily  docusate sodium, 100 mg, Oral, BID  levothyroxine, 50 mcg, Oral, Nightly  losartan, 100 mg, Oral, Q24H  [START ON 4/14/2023] pantoprazole, 40 mg, Oral, Q AM  polyethylene glycol, 17 g, Oral, BID  sorbitol, 50 mL, Oral, Once  sorbitol, 50 mL, Oral, Once  sucralfate, 1 g, Oral, 4x Daily AC & at Bedtime  vitamin D3, 5,000 Units, Oral, Daily      Continuous Infusions:   PRN Meds:•  acetaminophen  •  albuterol  •  HYDROcodone-acetaminophen  •  magnesium sulfate **OR** magnesium sulfate **OR** magnesium sulfate  •  naloxone  •  ondansetron  •  potassium & sodium phosphates **OR** potassium & sodium phosphates  •  potassium chloride  •  potassium chloride  •  sodium chloride    Assessment & Plan   Assessment and Plan:         Cholecystitis    Hyperlipidemia    Chronic obstructive pulmonary disease    Diverticulosis    Hypertension    Permanent atrial fibrillation    ASSESSMENT:  • Hyponatremia  • Acute cholecystitis  • Elevated liver enzymes because of the gallstones  • CKD stage  IIIa  • Hypertension  • Atrial fibrillation  • COPD              PLAN :      • Etiology of hyponatremia likely related to underlying liver problem at this time and acute sickness that might be causing some increased ADH effect.  Also contributing factor is the use of thiazide diuretics that might be causing some increase renal salt wasting  • Sodium level is improving after stopping thiazide diuretics.  • Continue to hold hydrochlorothiazide and we should not restart at the time of discharge.  • Losartan restarted okay to continue as at home medication  • Okay for cholecystectomy  • follow-up with the labs tomorrow morning  • Follow-up with a urine studies   • Slightly low sodium than yesterday I think it will get better okay to be discharged when okay with primary          Electronically signed by Harshil Mcfadden MD,   [facility] kidney consultant  [phone]  4/13/2023  10:18 EDT

## 2023-04-13 NOTE — PLAN OF CARE
Goal Outcome Evaluation:     Denied any need for pain medicine this shift. Passing flatus. Patient had been educated the importance of ambulating in the hallway, refused to do so tonight.  Phosphorus had been replaced. Call light is within reach. Care plan ongoing.

## 2023-04-13 NOTE — PLAN OF CARE
Goal Outcome Evaluation:  Plan of Care Reviewed With: patient        Progress: improving  Outcome Evaluation: Patient ambulating in room , electrolytes replaced, will discharge to home

## 2023-04-13 NOTE — PROGRESS NOTES
General Surgery Inpatient Progress Note      Name: Kingsley Hunt ADMIT: 2023   : 1940  PCP: Amanda Quinones DO    MRN: [id number] LOS: 4 days   AGE/SEX: 82 y.o. female  ROOM: [address] [place] [address]      Patient Care Team:  Amanda Quinones DO as PCP - General (Family Medicine)  Marbin Lawrence MD as Consulting Physician (Cardiac Electrophysiology)  Dara Means MD as Consulting Physician (Gastroenterology)  Jake Glynn OD (Optometry)  Chief Complaint   Patient presents with   • Abdominal Pain   • Vomiting     After eating felt like food is stuck at top of stomach, took omeprazole and baking soda, having severe pain in abd now       Subjective:  Patient seen and examined this AM.  From a surgical standpoint appears to be doing well.  She reports minimal pain.  Main complaint is feeling constipated.  Also with mild headache.    Medications:  acetaminophen, 650 mg, Oral, Once  apixaban, 5 mg, Oral, Q12H  bisacodyl, 10 mg, Oral, Once  bisacodyl, 10 mg, Rectal, Daily  docusate sodium, 100 mg, Oral, BID  levothyroxine, 50 mcg, Oral, Nightly  losartan, 100 mg, Oral, Q24H  [START ON 2023] pantoprazole, 40 mg, Oral, Q AM  polyethylene glycol, 17 g, Oral, BID  sorbitol, 50 mL, Oral, Once  sorbitol, 50 mL, Oral, Once  sucralfate, 1 g, Oral, 4x Daily AC & at Bedtime  vitamin D3, 5,000 Units, Oral, Daily       •  acetaminophen  •  albuterol  •  HYDROcodone-acetaminophen  •  magnesium sulfate **OR** magnesium sulfate **OR** magnesium sulfate  •  naloxone  •  ondansetron  •  potassium & sodium phosphates **OR** potassium & sodium phosphates  •  potassium chloride  •  potassium chloride  •  sodium chloride     Vitals:  Temp:  [97.4 °F (36.3 °C)-98.5 °F (36.9 °C)] 98.1 °F (36.7 °C)  Heart Rate:  [70-90] 76  Resp:  [15-17] 17  BP: (138-172)/(64-85) 144/79     Physical Exam:  NAD, alert  Nonlabored respirations  Abdomen soft, mildly distended, appropriately TTP, incisions c/d/i with dressings in  place    Labs:  Results from last 7 days   Lab Units 04/13/23  0001 04/11/23  2308 04/10/23  2245   WBC 10*3/mm3 6.60 6.90 6.00   HEMOGLOBIN g/dL 11.9* 12.7 11.1*   HEMATOCRIT % 33.3* 36.4 31.9*   PLATELETS 10*3/mm3 211 223 208     Results from last 7 days   Lab Units 04/13/23  0001 04/11/23  2308 04/10/23  2245   SODIUM mmol/L 134* 135* 134*   POTASSIUM mmol/L 3.6 3.7 3.6   CHLORIDE mmol/L 98 100 98   CO2 mmol/L 26.0 27.0 27.0   BUN mg/dL 8 8 14   CREATININE mg/dL 0.70 0.71 1.06*   CALCIUM mg/dL 9.2 9.3 9.0   BILIRUBIN mg/dL 1.2 1.8* 2.5*   ALK PHOS U/L 247* 222* 167*   ALT (SGPT) U/L 232* 318* 412*   AST (SGOT) U/L 103* 165* 251*   GLUCOSE mg/dL 116* 130* 125*     Assessment and Plan:  82 y.o. female POD # to s/p robotic assisted laparoscopic cholecystectomy, doing well.    -PRN pain control  -Low fat diet  -We will give Tylenol for headache  -Magnesium and phosphorus repleted today  -Aggressive bowel regimen  -Bilirubin normalized  -Okay to discharge from surgical standpoint, follow-up in 2 weeks    This note was created using Dragon Voice Recognition software.    Sondra Munoz MD  04/13/23  11:21 EDT

## 2023-04-14 ENCOUNTER — TRANSITIONAL CARE MANAGEMENT TELEPHONE ENCOUNTER (OUTPATIENT)
Dept: CALL CENTER | Facility: HOSPITAL | Age: 83
End: 2023-04-14
Payer: MEDICARE

## 2023-04-14 LAB
BACTERIA SPEC AEROBE CULT: NORMAL
BACTERIA SPEC AEROBE CULT: NORMAL

## 2023-04-14 NOTE — OUTREACH NOTE
Prep Survey    Flowsheet Row Responses   [facility] patient discharged from? [facility]   Is LACE score < 7 ? No   Eligibility [facility]   Date of Admission 04/08/23   Date of Discharge 04/13/23   Discharge Disposition Home or Self Care   Discharge diagnosis CHOLECYSTECTOMY   Does the patient have one of the following disease processes/diagnoses(primary or secondary)? General Surgery   Does the patient have Home health ordered? No   Is there a DME ordered? No   Prep survey completed? Yes          MEGHANA DONOHUE - Registered Nurse

## 2023-04-14 NOTE — OUTREACH NOTE
Call Center TCM Note    Flowsheet Row Responses   [facility] patient discharged from? [facility]   Does the patient have one of the following disease processes/diagnoses(primary or secondary)? General Surgery   TCM attempt successful? Yes   Call start time 1628   Call end time 1633   Discharge diagnosis CHOLECYSTECTOMY   Is patient permission given to speak with other caregiver? Yes   Person spoke with today (if not patient) and relationship Sherice   Meds reviewed with patient/caregiver? Yes   Is the patient having any side effects they believe may be caused by any medication additions or changes? No   Does the patient have all medications related to this admission filled (includes all antibiotics, pain medications, etc.) Yes   Is the patient taking all medications as directed (includes completed medication regime)? Yes   Comments She is eligible for a TCM appt from 04/14/23 to 04/28/23. TCM complete.   Does the patient have an appointment with their PCP within 7 days of discharge? No appointments available   Nursing Interventions Routed TCM call to PCP office   Has home health visited the patient within 72 hours of discharge? N/A   Psychosocial issues? No   Did the patient receive a copy of their discharge instructions? Yes   Nursing interventions Reviewed instructions with patient   What is the patient's perception of their health status since discharge? Improving   Nursing interventions Nurse provided patient education   Is the patient /caregiver able to teach back basic post-op care? Practice 'cough and deep breath', Drive as instructed by MD in discharge instructions, Take showers only when approved by MD-sponge bathe until then, Do not remove steri-strips, Lifting as instructed by MD in discharge instructions, Keep incision areas clean,dry and protected   Is the patient/caregiver able to teach back signs and symptoms of incisional infection? Increased redness, swelling or pain at the incisonal site, Increased  drainage or bleeding, Incisional warmth, Pus or odor from incision, Fever   Is the patient/caregiver able to teach back steps to recovery at home? Set small, achievable goals for return to baseline health, Eat a well-balance diet, Make a list of questions for surgeon's appointment   If the patient is a current smoker, are they able to teach back resources for cessation? Not a smoker   Is the patient/caregiver able to teach back the hierarchy of who to call/visit for symptoms/problems? PCP, Specialist, Home health nurse, Urgent Care, ED, 911 Yes   Additional teach back comments Incisions look good, No questions at this time.   TCM call completed? Yes   Wrap up additional comments She has a surgery f/u appt, no Pcp appt yet.   Call end time 1633   Would this patient benefit from a Referral to Amb Social Work? No   Is the patient interested in additional calls from an ambulatory ?  NOTE:  applies to high risk patients requiring additional follow-up. No          Sophie Martell RN    4/14/2023, 16:34 EDT

## 2023-04-17 LAB — HFE GENE MUT ANL BLD/T: NORMAL

## 2023-04-28 ENCOUNTER — OFFICE VISIT (OUTPATIENT)
Dept: SURGERY | Facility: CLINIC | Age: 83
End: 2023-04-28
Payer: MEDICARE

## 2023-04-28 VITALS
BODY MASS INDEX: 28.77 KG/M2 | WEIGHT: 179 LBS | TEMPERATURE: 97.1 F | DIASTOLIC BLOOD PRESSURE: 72 MMHG | OXYGEN SATURATION: 96 % | SYSTOLIC BLOOD PRESSURE: 119 MMHG | HEART RATE: 57 BPM | HEIGHT: 66 IN

## 2023-04-28 DIAGNOSIS — Z09 POSTOP CHECK: Primary | ICD-10-CM

## 2023-04-28 PROCEDURE — 1159F MED LIST DOCD IN RCRD: CPT | Performed by: SURGERY

## 2023-04-28 PROCEDURE — 3078F DIAST BP <80 MM HG: CPT | Performed by: SURGERY

## 2023-04-28 PROCEDURE — 1160F RVW MEDS BY RX/DR IN RCRD: CPT | Performed by: SURGERY

## 2023-04-28 PROCEDURE — 99024 POSTOP FOLLOW-UP VISIT: CPT | Performed by: SURGERY

## 2023-04-28 PROCEDURE — 3074F SYST BP LT 130 MM HG: CPT | Performed by: SURGERY

## 2023-04-28 NOTE — PROGRESS NOTES
"General Surgery Post-Operative Follow Up Note     Subjective:  Kingsley Hunt is a 82 y.o. year old female here for post-operative follow up.  The patient is doing well.  She denies any pain except for mild intermittent soreness at the incision to the left of the umbilicus.  She is tolerating a diet without any abdominal pain, nausea, vomiting.  She is having regular bowel function.    Procedure:    • Robotic assisted laparoscopic cholecystectomy, 4/11/2023    Pathology:    Gallbladder, cholecystectomy:    Chronic cholecystitis and cholelithiasis    Vitals:  /72   Pulse 57   Temp 97.1 °F (36.2 °C) (Infrared)   Ht 167.6 cm (66\")   Wt 81.2 kg (179 lb)   SpO2 96%   BMI 28.89 kg/m²      Physical Exam:   NAD, alert  Nonlabored respirations  Abdomen soft, NT/ND, no incisional hernias appreciated, incisions healing well    Assessment and Plan:  Kingsley Hunt is a 82 y.o. year old female status post robotic assisted laparoscopic cholecystectomy, doing well.    -Pathology reviewed  -No further activity or dietary restrictions  -Followup on PRN basis    Sondra Munoz M.D.  General Surgery  "

## 2023-05-01 ENCOUNTER — CLINICAL SUPPORT (OUTPATIENT)
Dept: FAMILY MEDICINE CLINIC | Facility: CLINIC | Age: 83
End: 2023-05-01
Payer: MEDICARE

## 2023-05-01 ENCOUNTER — TELEPHONE (OUTPATIENT)
Dept: FAMILY MEDICINE CLINIC | Facility: CLINIC | Age: 83
End: 2023-05-01

## 2023-05-01 DIAGNOSIS — Z01.818 PRE-OPERATIVE CLEARANCE: ICD-10-CM

## 2023-05-01 DIAGNOSIS — Z01.818 PRE-OPERATIVE CLEARANCE: Primary | ICD-10-CM

## 2023-05-01 PROCEDURE — 80053 COMPREHEN METABOLIC PANEL: CPT | Performed by: NURSE PRACTITIONER

## 2023-05-01 PROCEDURE — 36415 COLL VENOUS BLD VENIPUNCTURE: CPT | Performed by: FAMILY MEDICINE

## 2023-05-01 NOTE — TELEPHONE ENCOUNTER
PT CALLED STATES SHE IS HAVING SX ON 5/15 AND STATES SHE NEEDS A POTASIUM LEVEL DONE BEFORE HER SX. I ASKED IF HER SURGEON WILL ORDER THE LABS STATES IT IS A DERMATOLOGIST AND SHE WILL NEED US TO ORDER THE LABS. PLEASE ADVISE.

## 2023-05-01 NOTE — PROGRESS NOTES
Venipuncture performed in R HAND by RT Dede  with good hemostasis. Patient tolerated well. 05/01/23 Nilsa Greco, RT

## 2023-05-01 NOTE — TELEPHONE ENCOUNTER
Spoke with Patient, result note was understood and transferred call to  to make an appointment for labs.  Teresa Aragon MA

## 2023-05-02 LAB
ALBUMIN SERPL-MCNC: 4.3 G/DL (ref 3.5–5.2)
ALBUMIN/GLOB SERPL: 1.5 G/DL
ALP SERPL-CCNC: 111 U/L (ref 39–117)
ALT SERPL W P-5'-P-CCNC: 21 U/L (ref 1–33)
ANION GAP SERPL CALCULATED.3IONS-SCNC: 10.7 MMOL/L (ref 5–15)
AST SERPL-CCNC: 21 U/L (ref 1–32)
BILIRUB SERPL-MCNC: 0.6 MG/DL (ref 0–1.2)
BUN SERPL-MCNC: 9 MG/DL (ref 8–23)
BUN/CREAT SERPL: 11.4 (ref 7–25)
CALCIUM SPEC-SCNC: 10.6 MG/DL (ref 8.6–10.5)
CHLORIDE SERPL-SCNC: 95 MMOL/L (ref 98–107)
CO2 SERPL-SCNC: 24.3 MMOL/L (ref 22–29)
CREAT SERPL-MCNC: 0.79 MG/DL (ref 0.57–1)
EGFRCR SERPLBLD CKD-EPI 2021: 73.9 ML/MIN/1.73
GLOBULIN UR ELPH-MCNC: 2.8 GM/DL
GLUCOSE SERPL-MCNC: 86 MG/DL (ref 65–99)
POTASSIUM SERPL-SCNC: 4.7 MMOL/L (ref 3.5–5.2)
PROT SERPL-MCNC: 7.1 G/DL (ref 6–8.5)
SODIUM SERPL-SCNC: 130 MMOL/L (ref 136–145)

## 2023-05-03 ENCOUNTER — TELEPHONE (OUTPATIENT)
Dept: FAMILY MEDICINE CLINIC | Facility: CLINIC | Age: 83
End: 2023-05-03

## 2023-05-03 NOTE — TELEPHONE ENCOUNTER
Caller: Kingsley Hunt    Relationship: Self    Best call back number: [phone]    PATIENT IS REQUESTING WE PLEASE SEND HER BLOOD WORK RESULTS TO THE SURGERY CENTER AND TO HER SURGEON: GHULAM SUAREZ    [facility] LOCATED  [address] [place] [address]  PHONE NUMBER: [phone]     DR SUAREZ LOCATED  [place]- [address] [place] [address]  PHONE NUMBER [phone]    PATIENT HAS SURGERY THE 15 TH OF MAY.     SHE REQUESTS WE DO THIS ASAP

## 2023-05-03 NOTE — TELEPHONE ENCOUNTER
HUB to share    Most recent labs faxed to Dr Alcantara f# [phone] and [facility] f# [phone]

## 2023-05-08 ENCOUNTER — OFFICE VISIT (OUTPATIENT)
Dept: FAMILY MEDICINE CLINIC | Facility: CLINIC | Age: 83
End: 2023-05-08
Payer: MEDICARE

## 2023-05-08 VITALS
SYSTOLIC BLOOD PRESSURE: 104 MMHG | OXYGEN SATURATION: 99 % | WEIGHT: 184 LBS | BODY MASS INDEX: 29.57 KG/M2 | HEART RATE: 69 BPM | DIASTOLIC BLOOD PRESSURE: 69 MMHG | RESPIRATION RATE: 18 BRPM | HEIGHT: 66 IN | TEMPERATURE: 98.4 F

## 2023-05-08 DIAGNOSIS — E83.42 HYPOMAGNESEMIA: ICD-10-CM

## 2023-05-08 DIAGNOSIS — R10.2 VAGINAL PAIN: Primary | ICD-10-CM

## 2023-05-08 PROCEDURE — 85025 COMPLETE CBC W/AUTO DIFF WBC: CPT | Performed by: NURSE PRACTITIONER

## 2023-05-08 PROCEDURE — 86140 C-REACTIVE PROTEIN: CPT | Performed by: NURSE PRACTITIONER

## 2023-05-08 PROCEDURE — 80053 COMPREHEN METABOLIC PANEL: CPT | Performed by: NURSE PRACTITIONER

## 2023-05-08 PROCEDURE — 83735 ASSAY OF MAGNESIUM: CPT | Performed by: NURSE PRACTITIONER

## 2023-05-08 PROCEDURE — 85652 RBC SED RATE AUTOMATED: CPT | Performed by: NURSE PRACTITIONER

## 2023-05-08 PROCEDURE — 81001 URINALYSIS AUTO W/SCOPE: CPT | Performed by: NURSE PRACTITIONER

## 2023-05-08 NOTE — ASSESSMENT & PLAN NOTE
"He explains she has all her female organs. She had an emergency gallbladder surgery 3 weeks ago. 2 months ago she started to feel that something \"dropped\" or came loose in pelvis or rectum. This morning she reports she started to have pain in her vagina/pelvis area. She denies fever or chills. She denies any urinary symptoms. She denies pain at this time. She denies any vaginal discharge and is \"dry as a bone.\" Denies sexual activity. She has history of hemorrhoids and diverticulitis. She denies blood in her stool. Has not had a papsmear for 30 years.     Could be vaginitis, can't rule out other etiologies     US of pelvis ordered     Check urinalysis, CMP, CBC, CRP, and ESR   "

## 2023-05-08 NOTE — PROGRESS NOTES
"Chief Complaint  Chief Complaint   Patient presents with   • Vaginal Pain     Patient complains of pain in the vaginal area.         Subjective          Kingsley Hunt is an 82-year-old female who presents to the office today due to vaginal pain.    Vaginal pain- He explains she has all her female organs. She had an emergency gallbladder surgery 3 weeks ago. 2 months ago she started to feel that something \"dropped\" or came loose in pelvis or rectum. This morning she reports she started to have pain in her vagina/pelvis area. She denies fever or chills. She denies any urinary symptoms. She denies pain at this time. She denies any vaginal discharge and is \"dry as a bone.\" Denies sexual activity. She has history of hemorrhoids and diverticulitis. She denies blood in her stool.        Review of Systems   Constitutional: Negative for chills and fever.   HENT: Negative for congestion.    Respiratory: Positive for shortness of breath.    Cardiovascular: Negative for chest pain.   Gastrointestinal: Positive for abdominal pain. Negative for nausea and vomiting.   Genitourinary: Positive for pelvic pain, pelvic pressure and vaginal pain. Negative for difficulty urinating, menstrual problem, vaginal bleeding and vaginal discharge.   Musculoskeletal: Negative for arthralgias.   Skin: Negative.    Neurological: Negative for dizziness, light-headedness and headache.        Objective   Vital Signs:   Vitals:    05/08/23 1453   BP: 104/69   Pulse: 69   Resp: 18   Temp: 98.4 °F (36.9 °C)   SpO2: 99%      Estimated body mass index is 29.7 kg/m² as calculated from the following:    Height as of this encounter: 167.6 cm (66\").    Weight as of this encounter: 83.5 kg (184 lb).            Physical Exam  Vitals reviewed.   Constitutional:       Appearance: Normal appearance. She is normal weight.   HENT:      Head: Normocephalic and atraumatic.      Nose: Nose normal.   Eyes:      Extraocular Movements: Extraocular movements intact.      " "Conjunctiva/sclera: Conjunctivae normal.   Cardiovascular:      Rate and Rhythm: Normal rate and regular rhythm.      Pulses: Normal pulses.      Heart sounds: Normal heart sounds.      Comments: S1, S2 audible  Pulmonary:      Effort: Pulmonary effort is normal.      Breath sounds: Normal breath sounds.      Comments: On room air   Abdominal:      General: Abdomen is flat. Bowel sounds are normal. There is no distension.      Palpations: Abdomen is soft.      Tenderness: There is no abdominal tenderness.   Musculoskeletal:         General: Normal range of motion.      Cervical back: Normal range of motion.   Skin:     General: Skin is warm and dry.   Neurological:      General: No focal deficit present.      Mental Status: She is alert and oriented to person, place, and time. Mental status is at baseline.   Psychiatric:         Mood and Affect: Mood normal.         Behavior: Behavior normal.         Thought Content: Thought content normal.         Judgment: Judgment normal.                Physical Exam   Result Review :             Procedures       Assessment and Plan     Diagnoses and all orders for this visit:    1. Vaginal pain (Primary)  Assessment & Plan:  He explains she has all her female organs. She had an emergency gallbladder surgery 3 weeks ago. 2 months ago she started to feel that something \"dropped\" or came loose in pelvis or rectum. This morning she reports she started to have pain in her vagina/pelvis area. She denies fever or chills. She denies any urinary symptoms. She denies pain at this time. She denies any vaginal discharge and is \"dry as a bone.\" Denies sexual activity. She has history of hemorrhoids and diverticulitis. She denies blood in her stool. Has not had a papsmear for 30 years.     Could be vaginitis, can't rule out other etiologies     US of pelvis ordered     Check urinalysis, CMP, CBC, CRP, and ESR     Orders:  -     Comprehensive metabolic panel  -     CBC & Differential  -     " C-reactive Protein  -     Sedimentation rate, automated  -     Urinalysis With Culture If Indicated - Urine, Clean Catch  -     US Pelvis Complete; Future    2. Hypomagnesemia  -     Magnesium            Follow Up   Return if symptoms worsen or fail to improve.   Patient was given instructions and counseling regarding her condition or for health maintenance advice. Please see specific information pulled into the AVS if appropriate.

## 2023-05-08 NOTE — PROGRESS NOTES
Venipuncture performed in right arm by Shi Dutton CMA  with good hemostasis. Patient tolerated well. 05/08/23 Shi Dutton CMA

## 2023-05-09 ENCOUNTER — OFFICE VISIT (OUTPATIENT)
Dept: FAMILY MEDICINE CLINIC | Facility: CLINIC | Age: 83
End: 2023-05-09
Payer: MEDICARE

## 2023-05-09 VITALS
HEART RATE: 75 BPM | TEMPERATURE: 97.5 F | WEIGHT: 181 LBS | OXYGEN SATURATION: 100 % | RESPIRATION RATE: 16 BRPM | HEIGHT: 66 IN | BODY MASS INDEX: 29.09 KG/M2 | DIASTOLIC BLOOD PRESSURE: 79 MMHG | SYSTOLIC BLOOD PRESSURE: 135 MMHG

## 2023-05-09 DIAGNOSIS — Z90.49 S/P CHOLECYSTECTOMY: ICD-10-CM

## 2023-05-09 DIAGNOSIS — I10 PRIMARY HYPERTENSION: ICD-10-CM

## 2023-05-09 DIAGNOSIS — K21.9 GASTROESOPHAGEAL REFLUX DISEASE WITHOUT ESOPHAGITIS: ICD-10-CM

## 2023-05-09 DIAGNOSIS — E87.1 HYPONATREMIA: Primary | ICD-10-CM

## 2023-05-09 DIAGNOSIS — E03.9 ACQUIRED HYPOTHYROIDISM: ICD-10-CM

## 2023-05-09 DIAGNOSIS — J43.1 PANLOBULAR EMPHYSEMA: ICD-10-CM

## 2023-05-09 LAB
ALBUMIN SERPL-MCNC: 4.3 G/DL (ref 3.5–5.2)
ALBUMIN/GLOB SERPL: 1.6 G/DL
ALP SERPL-CCNC: 120 U/L (ref 39–117)
ALT SERPL W P-5'-P-CCNC: 21 U/L (ref 1–33)
ANION GAP SERPL CALCULATED.3IONS-SCNC: 12 MMOL/L (ref 5–15)
AST SERPL-CCNC: 26 U/L (ref 1–32)
BACTERIA UR QL AUTO: ABNORMAL /HPF
BASOPHILS # BLD AUTO: 0.02 10*3/MM3 (ref 0–0.2)
BASOPHILS NFR BLD AUTO: 0.3 % (ref 0–1.5)
BILIRUB SERPL-MCNC: 0.6 MG/DL (ref 0–1.2)
BILIRUB UR QL STRIP: NEGATIVE
BUN SERPL-MCNC: 12 MG/DL (ref 8–23)
BUN/CREAT SERPL: 18.5 (ref 7–25)
CALCIUM SPEC-SCNC: 10.2 MG/DL (ref 8.6–10.5)
CHLORIDE SERPL-SCNC: 94 MMOL/L (ref 98–107)
CLARITY UR: CLEAR
CO2 SERPL-SCNC: 24 MMOL/L (ref 22–29)
COLOR UR: YELLOW
CREAT SERPL-MCNC: 0.65 MG/DL (ref 0.57–1)
CRP SERPL-MCNC: 0.61 MG/DL (ref 0–0.5)
DEPRECATED RDW RBC AUTO: 43.4 FL (ref 37–54)
EGFRCR SERPLBLD CKD-EPI 2021: 86.9 ML/MIN/1.73
EOSINOPHIL # BLD AUTO: 0.15 10*3/MM3 (ref 0–0.4)
EOSINOPHIL NFR BLD AUTO: 1.9 % (ref 0.3–6.2)
ERYTHROCYTE [DISTWIDTH] IN BLOOD BY AUTOMATED COUNT: 12.2 % (ref 12.3–15.4)
ERYTHROCYTE [SEDIMENTATION RATE] IN BLOOD: 8 MM/HR (ref 0–30)
GLOBULIN UR ELPH-MCNC: 2.7 GM/DL
GLUCOSE SERPL-MCNC: 100 MG/DL (ref 65–99)
GLUCOSE UR STRIP-MCNC: NEGATIVE MG/DL
HCT VFR BLD AUTO: 37.5 % (ref 34–46.6)
HGB BLD-MCNC: 13.2 G/DL (ref 12–15.9)
HGB UR QL STRIP.AUTO: NEGATIVE
HYALINE CASTS UR QL AUTO: ABNORMAL /LPF
IMM GRANULOCYTES # BLD AUTO: 0.03 10*3/MM3 (ref 0–0.05)
IMM GRANULOCYTES NFR BLD AUTO: 0.4 % (ref 0–0.5)
KETONES UR QL STRIP: NEGATIVE
LEUKOCYTE ESTERASE UR QL STRIP.AUTO: ABNORMAL
LYMPHOCYTES # BLD AUTO: 1.92 10*3/MM3 (ref 0.7–3.1)
LYMPHOCYTES NFR BLD AUTO: 24.8 % (ref 19.6–45.3)
MAGNESIUM SERPL-MCNC: 1.9 MG/DL (ref 1.6–2.4)
MCH RBC QN AUTO: 34.2 PG (ref 26.6–33)
MCHC RBC AUTO-ENTMCNC: 35.2 G/DL (ref 31.5–35.7)
MCV RBC AUTO: 97.2 FL (ref 79–97)
MONOCYTES # BLD AUTO: 0.77 10*3/MM3 (ref 0.1–0.9)
MONOCYTES NFR BLD AUTO: 9.9 % (ref 5–12)
NEUTROPHILS NFR BLD AUTO: 4.85 10*3/MM3 (ref 1.7–7)
NEUTROPHILS NFR BLD AUTO: 62.7 % (ref 42.7–76)
NITRITE UR QL STRIP: NEGATIVE
NRBC BLD AUTO-RTO: 0 /100 WBC (ref 0–0.2)
PH UR STRIP.AUTO: 7 [PH] (ref 5–8)
PLATELET # BLD AUTO: 265 10*3/MM3 (ref 140–450)
PMV BLD AUTO: 11 FL (ref 6–12)
POTASSIUM SERPL-SCNC: 3.8 MMOL/L (ref 3.5–5.2)
PROT SERPL-MCNC: 7 G/DL (ref 6–8.5)
PROT UR QL STRIP: NEGATIVE
RBC # BLD AUTO: 3.86 10*6/MM3 (ref 3.77–5.28)
RBC # UR STRIP: ABNORMAL /HPF
REF LAB TEST METHOD: ABNORMAL
SODIUM SERPL-SCNC: 130 MMOL/L (ref 136–145)
SP GR UR STRIP: 1.01 (ref 1–1.03)
SQUAMOUS #/AREA URNS HPF: ABNORMAL /HPF
UROBILINOGEN UR QL STRIP: ABNORMAL
WBC # UR STRIP: ABNORMAL /HPF
WBC NRBC COR # BLD: 7.74 10*3/MM3 (ref 3.4–10.8)

## 2023-05-09 RX ORDER — OMEPRAZOLE 40 MG/1
40 CAPSULE, DELAYED RELEASE ORAL DAILY
Status: SHIPPED | COMMUNITY
Start: 2023-05-09

## 2023-05-09 RX ORDER — LEVOTHYROXINE SODIUM 0.05 MG/1
50 TABLET ORAL DAILY
Qty: 90 TABLET | Refills: 0 | Status: SHIPPED | OUTPATIENT
Start: 2023-05-09

## 2023-05-09 RX ORDER — FLUTICASONE PROPIONATE 50 MCG
2 SPRAY, SUSPENSION (ML) NASAL DAILY
Start: 2023-05-09

## 2023-05-09 NOTE — PROGRESS NOTES
Subjective   Kingsley Hunt is a 82 y.o. female.     Chief Complaint   Patient presents with   • Results     Lab results   • Hypertension   • Hypothyroidism         Current Outpatient Medications:   •  acebutolol (SECTRAL) 200 MG capsule, Take 1 capsule by mouth Daily., Disp: 90 capsule, Rfl: 1  •  albuterol sulfate  (90 Base) MCG/ACT inhaler, Inhale 1 puff Every 4 (Four) Hours As Needed for Shortness of Air or Wheezing., Disp: , Rfl:   •  apixaban (ELIQUIS) 5 MG tablet tablet, Take 1 tablet by mouth Every 12 (Twelve) Hours., Disp: 180 tablet, Rfl: 3  •  levothyroxine (SYNTHROID, LEVOTHROID) 50 MCG tablet, Take 1 tablet by mouth Daily., Disp: 90 tablet, Rfl: 0  •  losartan-hydrochlorothiazide (Hyzaar) 100-12.5 MG per tablet, Take 1 tablet by mouth Daily., Disp: 90 tablet, Rfl: 1  •  MAGNESIUM OXIDE PO, 1 po qd (magnesium O7), Disp: , Rfl:   •  omeprazole (priLOSEC) 40 MG capsule, Take 1 capsule by mouth Daily., Disp: , Rfl:   •  sucralfate (CARAFATE) 1 g tablet, Take 1 tablet by mouth 4 (Four) Times a Day Before Meals & at Bedtime., Disp: 120 tablet, Rfl: 2  •  fluticasone (FLONASE) 50 MCG/ACT nasal spray, 2 sprays into the nostril(s) as directed by provider Daily., Disp: , Rfl:     Past Medical History:   Diagnosis Date   • Arthritis    • BCC     Nose   • CHOL    • COPD 12/08/2014   • Diabetes mellitus    • Diverticulosis    • GERD    • HTN    • Hypothyroid        Past Surgical History:   Procedure Laterality Date   • APPENDECTOMY     • CATARACT EXTRACTION WITH INTRAOCULAR LENS IMPLANT Bilateral    • CHOLECYSTECTOMY N/A 4/11/2023    Procedure: CHOLECYSTECTOMY LAPAROSCOPIC WITH "[employer]I ROBOT;  Surgeon: Sondra Munoz MD;  Location: [facility];  Service: [employer];  Laterality: N/A;   • COLONOSCOPY     • ENDOSCOPY     • SKIN BIOPSY      Nose   • WRIST FRACTURE SURGERY Left 2003       Family History   Problem Relation Age of Onset   • Brain cancer Mother    • COPD Father    • Diabetes Sister    •  Heart disease Sister    • Heart disease Brother    • No Known Problems Brother    • Diabetes Son        Social History     Socioeconomic History   • Marital status:    Tobacco Use   • Smoking status: Former     Packs/day: 1.00     Years: 10.00     Pack years: 10.00     Types: Cigarettes     Quit date:      Years since quittin.3     Passive exposure: Past   • Smokeless tobacco: Never   • Tobacco comments:     Quit 30+ years ago   Vaping Use   • Vaping Use: Never used   Substance and Sexual Activity   • Alcohol use: Yes     Alcohol/week: 3.0 standard drinks     Types: 3 Glasses of wine per week     Comment: rare   • Drug use: No   • Sexual activity: Defer       History of Present Illness  81 y/o C female here for f/u on HTN/ labs and recent hosp stay/ GERD    Pt states she saw NP yest for vaginal pain and she ordered pelvic US-----Pt wanting to get this before seeing GYNE------she has too much going on right now    Pt states she was recently in the hosp and had her GB removed; now she has to have skin cancer sx on her nose       The following portions of the patient's history were reviewed and updated as appropriate: allergies, current medications, past family history, past medical history, past social history, past surgical history and problem list.    Review of Systems   Constitutional: Positive for activity change and appetite change. Negative for fatigue, unexpected weight gain and unexpected weight loss.   Respiratory: Negative for cough, chest tightness and shortness of breath.    Cardiovascular: Negative for chest pain, palpitations and leg swelling.   Gastrointestinal: Negative for abdominal distention, abdominal pain, constipation, diarrhea, nausea, vomiting and GERD.   Genitourinary: Positive for vaginal pain. Negative for dysuria, frequency, urgency, urinary incontinence, vaginal bleeding and vaginal discharge.   Musculoskeletal: Negative for arthralgias and myalgias.   Neurological:  Negative for dizziness, facial asymmetry, speech difficulty, weakness, light-headedness, headache, memory problem and confusion.   Psychiatric/Behavioral: Positive for stress.       Vitals:    05/09/23 1503   BP: 135/79   Pulse: 75   Resp: 16   Temp: 97.5 °F (36.4 °C)   SpO2: 100%       Objective   Physical Exam  Vitals and nursing note reviewed.   Constitutional:       General: She is not in acute distress.     Appearance: She is well-developed. She is not ill-appearing or toxic-appearing.   HENT:      Head: Normocephalic and atraumatic.   Cardiovascular:      Rate and Rhythm: Normal rate and regular rhythm.      Heart sounds: Normal heart sounds. No murmur heard.  Pulmonary:      Effort: Pulmonary effort is normal. No respiratory distress.      Breath sounds: Normal breath sounds. No stridor. No wheezing, rhonchi or rales.   Skin:     General: Skin is warm and dry.      Findings: No rash.   Neurological:      Mental Status: She is alert and oriented to person, place, and time.      Cranial Nerves: No cranial nerve deficit.   Psychiatric:         Attention and Perception: Attention and perception normal.         Mood and Affect: Mood and affect normal.         Speech: Speech normal.         Behavior: Behavior normal. Behavior is cooperative.         Thought Content: Thought content normal.         Cognition and Memory: Cognition and memory normal.         Judgment: Judgment normal.           Assessment & Plan   Diagnoses and all orders for this visit:    1. Hyponatremia (Primary)  -     Basic Metabolic Panel; Future    2. S/P cholecystectomy    3. Primary hypertension    4. Acquired hypothyroidism    5. Panlobular emphysema    6. Gastroesophageal reflux disease without esophagitis    Other orders  -     levothyroxine (SYNTHROID, LEVOTHROID) 50 MCG tablet; Take 1 tablet by mouth Daily.  Dispense: 90 tablet; Refill: 0  -     fluticasone (FLONASE) 50 MCG/ACT nasal spray; 2 sprays into the nostril(s) as directed by  provider Daily.    reviewed hosp stay w/ pt and upcoming skin sx  Med refills  Disc'd poss vaginal pain etiologies  rech BMP in June

## 2023-05-12 DIAGNOSIS — R10.2 VAGINAL PAIN: Primary | ICD-10-CM

## 2023-05-15 DIAGNOSIS — E11.9 TYPE 2 DIABETES MELLITUS WITHOUT COMPLICATIONS: ICD-10-CM

## 2023-05-15 RX ORDER — BLOOD SUGAR DIAGNOSTIC
STRIP MISCELLANEOUS
Qty: 300 EACH | Refills: 3 | Status: SHIPPED | OUTPATIENT
Start: 2023-05-15

## 2023-05-22 ENCOUNTER — TELEPHONE (OUTPATIENT)
Dept: FAMILY MEDICINE CLINIC | Facility: CLINIC | Age: 83
End: 2023-05-22

## 2023-05-22 NOTE — TELEPHONE ENCOUNTER
Caller: Kingsley Hunt    Relationship: Self    Best call back number: *  Kingsley Hunt (Self) [phone] (Mobile)         What orders are you requesting (i.e. lab or imaging): ULTRASOUND ORDER THAT PATIENT HAD TO RESCHEDULE     In what timeframe would the patient need to come in:ASAP     Where will you receive your lab/imaging services:     Additional notes: *IS THE ORDER STILL ACTIVE?

## 2023-05-22 NOTE — TELEPHONE ENCOUNTER
HUB to share    Orders reopened and sent back to scheduling. Pt can call to sched at anytime also. [phone]

## 2023-05-23 NOTE — TELEPHONE ENCOUNTER
[facility] RELAYED MESSAGE    ORTHOPAEDIC CONSULT     Referring Physician: ED    CC  Left hip pain    HPI  David Anna is a 77 year old male who presents with left hip pain after a slip and fall onto his let side. He admits to hitting his head but no other injuries. He had immediate pain in the hip and was unable to ambulate. Denies any previous injury to the hip. Patient is on Eliquis for possible arrhythmia.     HISTORY  No past medical history on file.    Past Surgical History  No past surgical history on file.    Social History  Tobacco: Never  Alcohol: Rarely  Drugs: No history of use  Ambulatory status: Ambulates without assistive devices  Home: lives with wife in a house    Family History  No family history on file.    MEDICATIONS  No current facility-administered medications for this encounter.     Current Outpatient Medications   Medication Sig Dispense Refill   • PANTOPRAZOLE SODIUM PO Take 40 mg by mouth 2 times daily.     • hydrALAZINE (APRESOLINE) 25 MG tablet Take 25 mg by mouth 2 times daily. if the systolic b/p less than 110, hold medication     • METOPROLOL SUCCINATE PO Take 25 mg by mouth daily.     • terazosin (HYTRIN) 10 MG capsule Take 10 mg by mouth daily.     • amiodarone (PACERONE) 400 MG tablet Take 400 mg by mouth 2 times daily.     • ALPRAZolam (XANAX) 0.25 MG tablet Take 0.25 mg by mouth nightly as needed. as needed for anxiety     • amLODIPine (NORVASC) 10 MG tablet Take 10 mg by mouth daily.     • apixaBAN (ELIQUIS) 5 MG Tab Take 5 mg by mouth 2 times daily.     • aspirin 81 MG tablet Take 81 mg by mouth daily.     • clopidogrel (PLAVIX) 75 MG tablet Take 75 mg by mouth daily.     • fluoxetine (PROZAC) 40 MG capsule Take 40 mg by mouth daily.          ALLERGIES  ALLERGIES:   Allergen Reactions   • Penicillins Other (See Comments)       REVIEW OF SYSTEMS  General: denies chills, fever, or anxiety  Cardiovascular: denies chest pain or palpitations  Respiratory: denies cough, shortness of breath, or  wheezing  Musculoskeletal: Left hip pain  Neurological: denies weakness, paresthesias / numbness, or saddle anesthesia   Gastrointestinal: denies abdominal/flank pain,  nausea / vomiting, or loss of bowel function  Genitourinary: denies dysuria, hematuria, or loss of bladder function  Dermatological: denies rash or skin lesion changes    PHYSICAL EXAM  Visit Vitals  BP (!) 188/85   Pulse 71   Temp 97.9 °F (36.6 °C) (Temporal)   Resp 17   SpO2 98%     General:   Alert, cooperative, no distress, appears stated age  HEENT:   NC/AT, PERRL, EOMFI, MMM  Neck:   symmetrical, supple, trachea midline  Skin:   Warm,dry, and intact. Skin color, texture, turgor normal. No rashes or lesions.    RUE:  Grossly NVI   +AIN/PIN/M/R/U intact   SILT C5-T1  +2/4 distal pulses with capillary refill <2 sec   Hand WWP   Compartments soft    LUE:  Grossly NVI   +AIN/PIN/M/R/U intact   SILT C5-T1  +2/4 distal pulses with capillary refill <2 sec   Hand WWP   Compartments soft    RLE:  Grossly NVI   +EHL/FHL/GC/TA intact  SILT L2-S1   Neg log roll  +2/4 distal pulses with capillary refill <2 sec   Foot WWP  Compartments Soft; no calf TTP    LLE:  Grossly NVI   +EHL/FHL/GC/TA intact  Log roll and ROM deferred 2/2 fx  SILT L2-S1   +2/4 distal pulses with capillary refill <2 sec   Foot WWP  Compartments Soft; no calf TTP      RADIOLOGY REVIEW  Xray pelvis and left hip demonstrates IT fracture    LABS  Lab Results   Component Value Date    HGB 15.4 01/09/2022    HGB 9.7 (L) 11/09/2019    HGB 9.4 (L) 11/07/2019    WBC 6.7 01/09/2022    WBC 8.9 11/09/2019    WBC 12.3 (H) 11/07/2019         ASSESSMENT & PLAN  · David Anna is a 77 year old male with L IT fx    · WB: NWB LLE  · DVT ppx:Hold for OR  · Preop labs and imaging  · Imaging: femur xr  · Medical/cardiology consult for preoperative clearance ASAP   · Pain meds: Orals   · Dispo: Plan for OR 1/10 pending medical clearance  · Discussed with attending who agrees with plan    Rubens Ch  DO PGY-3 Orthopedics  1/9/2022  7:17 PM  Please [facility]

## 2023-05-26 ENCOUNTER — HOSPITAL ENCOUNTER (OUTPATIENT)
Dept: ULTRASOUND IMAGING | Facility: HOSPITAL | Age: 83
Discharge: HOME | End: 2023-05-26
Payer: MEDICARE

## 2023-05-26 ENCOUNTER — OFFICE VISIT (OUTPATIENT)
Dept: FAMILY MEDICINE CLINIC | Facility: CLINIC | Age: 83
End: 2023-05-26
Payer: MEDICARE

## 2023-05-26 VITALS
OXYGEN SATURATION: 100 % | DIASTOLIC BLOOD PRESSURE: 75 MMHG | TEMPERATURE: 97.3 F | SYSTOLIC BLOOD PRESSURE: 129 MMHG | HEIGHT: 66 IN | BODY MASS INDEX: 29.09 KG/M2 | WEIGHT: 181 LBS | HEART RATE: 80 BPM

## 2023-05-26 DIAGNOSIS — R10.32 LEFT LOWER QUADRANT ABDOMINAL PAIN: Primary | ICD-10-CM

## 2023-05-26 DIAGNOSIS — R10.2 VAGINAL PAIN: ICD-10-CM

## 2023-05-26 PROCEDURE — 85652 RBC SED RATE AUTOMATED: CPT | Performed by: NURSE PRACTITIONER

## 2023-05-26 PROCEDURE — 93976 VASCULAR STUDY: CPT

## 2023-05-26 PROCEDURE — 86140 C-REACTIVE PROTEIN: CPT | Performed by: NURSE PRACTITIONER

## 2023-05-26 PROCEDURE — 76856 US EXAM PELVIC COMPLETE: CPT

## 2023-05-26 PROCEDURE — 80053 COMPREHEN METABOLIC PANEL: CPT | Performed by: NURSE PRACTITIONER

## 2023-05-26 RX ORDER — METRONIDAZOLE 500 MG/1
500 TABLET ORAL 3 TIMES DAILY
Qty: 30 TABLET | Refills: 0 | Status: SHIPPED | OUTPATIENT
Start: 2023-05-26

## 2023-05-26 RX ORDER — CIPROFLOXACIN 500 MG/1
500 TABLET, FILM COATED ORAL 2 TIMES DAILY
Qty: 20 TABLET | Refills: 0 | Status: SHIPPED | OUTPATIENT
Start: 2023-05-26 | End: 2023-06-05

## 2023-05-26 NOTE — PATIENT INSTRUCTIONS
Patient instructed to go to ED if develop fever, chills, or increased abdominal pain    Check labs    Prescribed cipro and flagyl      Some foods to avoid for diverticulosis is seeds and nuts, popcorn, tomatoes, spicy foods, processed meats, alcohol, caffeine, chocolate, fried foods, dairy products, artificial sweeteners, canned fruits, corn, whole wheat products, dried fruits, rice, fruits with seeds, and bran.

## 2023-05-26 NOTE — PROGRESS NOTES
Venipuncture performed in Left arm by Teresa Aragon MA with good hemostasis. Patient tolerated well. Teresa Aragon MA 04/14/23

## 2023-05-26 NOTE — PROGRESS NOTES
"Chief Complaint  Chief Complaint   Patient presents with   • Diverticulitis     Pt stated symptoms started about 5 days ago        Subjective          Kingsley Hunt is  a 82 year old female who presented to the office today with abdominal pain.     Abdominal pain- Started 5 days ago. Believes she has a diverticulitis attack. Denies fever or chills. Her pain is in her lower abdomen. Denies burning on urination. Her pain is currently 9/10. Describes the pain as constant pain/pressure. Last bowel movement was this morning and was diarrhea. Having a bowel movements helps and laying down helps some. Denies any aggravating factors.        Review of Systems   Constitutional: Negative for chills and fever.   HENT: Negative for congestion.    Respiratory: Negative for shortness of breath.    Cardiovascular: Negative for chest pain.   Gastrointestinal: Positive for abdominal pain, diarrhea and nausea. Negative for vomiting.   Genitourinary: Positive for pelvic pressure. Negative for difficulty urinating.   Musculoskeletal: Negative for arthralgias.   Skin: Negative.    Neurological: Negative for dizziness, light-headedness and headache.        Objective   Vital Signs:   Vitals:    05/26/23 0842   BP: 129/75   Pulse: 80   Temp: 97.3 °F (36.3 °C)   SpO2: 100%      Estimated body mass index is 29.23 kg/m² as calculated from the following:    Height as of this encounter: 167.6 cm (65.98\").    Weight as of this encounter: 82.1 kg (181 lb).          Physical Exam  Vitals reviewed.   Constitutional:       Appearance: Normal appearance. She is normal weight.   HENT:      Head: Normocephalic and atraumatic.      Nose: Nose normal.   Eyes:      Extraocular Movements: Extraocular movements intact.      Conjunctiva/sclera: Conjunctivae normal.   Cardiovascular:      Rate and Rhythm: Normal rate. Rhythm irregular.      Pulses: Normal pulses.      Heart sounds: Normal heart sounds.      Comments: S1, S2 audible  Pulmonary:      Effort: " Pulmonary effort is normal.      Breath sounds: Normal breath sounds.   Abdominal:      General: Abdomen is flat.      Palpations: Abdomen is soft.      Tenderness: There is abdominal tenderness.      Comments: Lower abdominal right and left quadrant tenderness, hyperactive bowel sounds   Musculoskeletal:         General: Normal range of motion.      Cervical back: Normal range of motion.   Skin:     General: Skin is warm and dry.   Neurological:      General: No focal deficit present.      Mental Status: She is alert and oriented to person, place, and time. Mental status is at baseline.   Psychiatric:         Mood and Affect: Mood normal.         Behavior: Behavior normal.         Thought Content: Thought content normal.         Judgment: Judgment normal.                Physical Exam   Result Review :             Procedures       Assessment and Plan     Diagnoses and all orders for this visit:    1. Left lower quadrant abdominal pain (Primary)  Assessment & Plan:  Started 5 days ago. Believes she has a diverticulitis attack. Denies fever or chills. Her pain is in her lower abdomen. Denies burning on urination. Her pain is currently 9/10. Describes the pain as constant pain/pressure. Last bowel movement was this morning and was diarrhea. Having a bowel movements helps and laying down helps some. Denies any aggravating factors. She reports she has a pelvic ultrasound this afternoon for possible bladder prolapse or rectal issues.     Likely diverticulitis due to patients history     Patient instructed to go to ED if develop fever, chills, or increased abdominal pain    Check labs    Diverticulitis education given    Prescribed cipro and flagyl     Orders:  -     CBC & Differential  -     Comprehensive metabolic panel  -     Sedimentation rate, automated  -     C-reactive Protein    Other orders  -     ciprofloxacin (Cipro) 500 MG tablet; Take 1 tablet by mouth 2 (Two) Times a Day for 10 days.  Dispense: 20 tablet;  Refill: 0  -     metroNIDAZOLE (Flagyl) 500 MG tablet; Take 1 tablet by mouth 3 (Three) Times a Day.  Dispense: 30 tablet; Refill: 0            Follow Up   Return if symptoms worsen or fail to improve.   Patient was given instructions and counseling regarding her condition or for health maintenance advice. Please see specific information pulled into the AVS if appropriate.

## 2023-05-26 NOTE — ASSESSMENT & PLAN NOTE
Started 5 days ago. Believes she has a diverticulitis attack. Denies fever or chills. Her pain is in her lower abdomen. Denies burning on urination. Her pain is currently 9/10. Describes the pain as constant pain/pressure. Last bowel movement was this morning and was diarrhea. Having a bowel movements helps and laying down helps some. Denies any aggravating factors. She reports she has a pelvic ultrasound this afternoon for possible bladder prolapse or rectal issues.     Likely diverticulitis due to patients history     Patient instructed to go to ED if develop fever, chills, or increased abdominal pain    Check labs    Diverticulitis education given    Prescribed cipro and flagyl

## 2023-05-27 LAB
ALBUMIN SERPL-MCNC: 3.8 G/DL (ref 3.5–5.2)
ALBUMIN/GLOB SERPL: 1.1 G/DL
ALP SERPL-CCNC: 99 U/L (ref 39–117)
ALT SERPL W P-5'-P-CCNC: 19 U/L (ref 1–33)
ANION GAP SERPL CALCULATED.3IONS-SCNC: 9.9 MMOL/L (ref 5–15)
AST SERPL-CCNC: 18 U/L (ref 1–32)
BILIRUB SERPL-MCNC: 0.6 MG/DL (ref 0–1.2)
BUN SERPL-MCNC: 11 MG/DL (ref 8–23)
BUN/CREAT SERPL: 15.5 (ref 7–25)
CALCIUM SPEC-SCNC: 10.8 MG/DL (ref 8.6–10.5)
CHLORIDE SERPL-SCNC: 93 MMOL/L (ref 98–107)
CO2 SERPL-SCNC: 26.1 MMOL/L (ref 22–29)
CREAT SERPL-MCNC: 0.71 MG/DL (ref 0.57–1)
CRP SERPL-MCNC: 4.73 MG/DL (ref 0–0.5)
EGFRCR SERPLBLD CKD-EPI 2021: 84 ML/MIN/1.73
ERYTHROCYTE [SEDIMENTATION RATE] IN BLOOD: 11 MM/HR (ref 0–30)
GLOBULIN UR ELPH-MCNC: 3.4 GM/DL
GLUCOSE SERPL-MCNC: 126 MG/DL (ref 65–99)
POTASSIUM SERPL-SCNC: 5 MMOL/L (ref 3.5–5.2)
PROT SERPL-MCNC: 7.2 G/DL (ref 6–8.5)
SODIUM SERPL-SCNC: 129 MMOL/L (ref 136–145)

## 2023-05-31 DIAGNOSIS — E87.1 HYPONATREMIA: Primary | ICD-10-CM

## 2023-05-31 DIAGNOSIS — E83.52 SERUM CALCIUM ELEVATED: ICD-10-CM

## 2023-05-31 RX ORDER — VALSARTAN 160 MG/1
160 TABLET ORAL DAILY
Qty: 30 TABLET | Refills: 1 | Status: SHIPPED | OUTPATIENT
Start: 2023-05-31 | End: 2023-06-02

## 2023-06-02 ENCOUNTER — TELEPHONE (OUTPATIENT)
Dept: FAMILY MEDICINE CLINIC | Facility: CLINIC | Age: 83
End: 2023-06-02

## 2023-06-02 RX ORDER — BENAZEPRIL HYDROCHLORIDE 20 MG/1
20 TABLET ORAL DAILY
Qty: 30 TABLET | Refills: 1 | Status: SHIPPED | OUTPATIENT
Start: 2023-06-02

## 2023-06-02 NOTE — TELEPHONE ENCOUNTER
Caller: [facility], [place] - [address], [place] - [phone]  - [phone]    Relationship: Pharmacy    What was the call regarding: PATIENT REQUESTING JUST THE [place] NOT THE NEW MEDICATION.  PLEASE ADVISE

## 2023-06-16 ENCOUNTER — CLINICAL SUPPORT (OUTPATIENT)
Dept: FAMILY MEDICINE CLINIC | Facility: CLINIC | Age: 83
End: 2023-06-16
Payer: MEDICARE

## 2023-06-16 DIAGNOSIS — E87.1 HYPONATREMIA: ICD-10-CM

## 2023-06-16 DIAGNOSIS — E83.52 SERUM CALCIUM ELEVATED: ICD-10-CM

## 2023-06-16 LAB
CA-I BLD-MCNC: 5.6 MG/DL (ref 4.6–5.4)
CA-I SERPL ISE-MCNC: 1.4 MMOL/L (ref 1.15–1.35)

## 2023-06-16 PROCEDURE — 36415 COLL VENOUS BLD VENIPUNCTURE: CPT | Performed by: FAMILY MEDICINE

## 2023-06-16 PROCEDURE — 80053 COMPREHEN METABOLIC PANEL: CPT | Performed by: FAMILY MEDICINE

## 2023-06-16 PROCEDURE — 85025 COMPLETE CBC W/AUTO DIFF WBC: CPT | Performed by: NURSE PRACTITIONER

## 2023-06-16 PROCEDURE — 82330 ASSAY OF CALCIUM: CPT | Performed by: FAMILY MEDICINE

## 2023-06-16 NOTE — PROGRESS NOTES
Venipuncture performed in L ARM by RT Dede  with good hemostasis. Patient tolerated well. 06/16/23 Nilsa Greco, RT

## 2023-06-17 LAB
ALBUMIN SERPL-MCNC: 4 G/DL (ref 3.5–5.2)
ALBUMIN/GLOB SERPL: 1.4 G/DL
ALP SERPL-CCNC: 84 U/L (ref 39–117)
ALT SERPL W P-5'-P-CCNC: 19 U/L (ref 1–33)
ANION GAP SERPL CALCULATED.3IONS-SCNC: 10 MMOL/L (ref 5–15)
AST SERPL-CCNC: 19 U/L (ref 1–32)
BASOPHILS # BLD AUTO: 0.03 10*3/MM3 (ref 0–0.2)
BASOPHILS NFR BLD AUTO: 0.5 % (ref 0–1.5)
BILIRUB SERPL-MCNC: 0.4 MG/DL (ref 0–1.2)
BUN SERPL-MCNC: 11 MG/DL (ref 8–23)
BUN/CREAT SERPL: 13.9 (ref 7–25)
CALCIUM SPEC-SCNC: 9.8 MG/DL (ref 8.6–10.5)
CHLORIDE SERPL-SCNC: 101 MMOL/L (ref 98–107)
CO2 SERPL-SCNC: 25 MMOL/L (ref 22–29)
CREAT SERPL-MCNC: 0.79 MG/DL (ref 0.57–1)
DEPRECATED RDW RBC AUTO: 42.8 FL (ref 37–54)
EGFRCR SERPLBLD CKD-EPI 2021: 73.9 ML/MIN/1.73
EOSINOPHIL # BLD AUTO: 0.2 10*3/MM3 (ref 0–0.4)
EOSINOPHIL NFR BLD AUTO: 3.5 % (ref 0.3–6.2)
ERYTHROCYTE [DISTWIDTH] IN BLOOD BY AUTOMATED COUNT: 12.2 % (ref 12.3–15.4)
GLOBULIN UR ELPH-MCNC: 2.8 GM/DL
GLUCOSE SERPL-MCNC: 131 MG/DL (ref 65–99)
HCT VFR BLD AUTO: 36.6 % (ref 34–46.6)
HGB BLD-MCNC: 12.3 G/DL (ref 12–15.9)
IMM GRANULOCYTES # BLD AUTO: 0.02 10*3/MM3 (ref 0–0.05)
IMM GRANULOCYTES NFR BLD AUTO: 0.3 % (ref 0–0.5)
LYMPHOCYTES # BLD AUTO: 1.46 10*3/MM3 (ref 0.7–3.1)
LYMPHOCYTES NFR BLD AUTO: 25.4 % (ref 19.6–45.3)
MCH RBC QN AUTO: 32.5 PG (ref 26.6–33)
MCHC RBC AUTO-ENTMCNC: 33.6 G/DL (ref 31.5–35.7)
MCV RBC AUTO: 96.6 FL (ref 79–97)
MONOCYTES # BLD AUTO: 0.49 10*3/MM3 (ref 0.1–0.9)
MONOCYTES NFR BLD AUTO: 8.5 % (ref 5–12)
NEUTROPHILS NFR BLD AUTO: 3.55 10*3/MM3 (ref 1.7–7)
NEUTROPHILS NFR BLD AUTO: 61.8 % (ref 42.7–76)
NRBC BLD AUTO-RTO: 0 /100 WBC (ref 0–0.2)
PLATELET # BLD AUTO: 298 10*3/MM3 (ref 140–450)
PMV BLD AUTO: 10.5 FL (ref 6–12)
POTASSIUM SERPL-SCNC: 4.2 MMOL/L (ref 3.5–5.2)
PROT SERPL-MCNC: 6.8 G/DL (ref 6–8.5)
RBC # BLD AUTO: 3.79 10*6/MM3 (ref 3.77–5.28)
SODIUM SERPL-SCNC: 136 MMOL/L (ref 136–145)
WBC NRBC COR # BLD: 5.75 10*3/MM3 (ref 3.4–10.8)

## 2023-06-20 ENCOUNTER — TELEPHONE (OUTPATIENT)
Dept: FAMILY MEDICINE CLINIC | Facility: CLINIC | Age: 83
End: 2023-06-20

## 2023-06-20 NOTE — TELEPHONE ENCOUNTER
Caller: Kingsley Hunt    Relationship: Self    Best call back number: [phone]     What is the best time to reach you: ANY     Who are you requesting to speak with (clinical staff, provider,  specific staff member): CLINICAL STAFF     What was the call regarding: WOULD LIKE TO GO OVER MOST RECENT LAB RESULTS     Is it okay if the provider responds through [employer]: PLEASE CALL

## 2023-08-03 RX ORDER — VALSARTAN 160 MG/1
TABLET ORAL
Qty: 30 TABLET | Refills: 1 | OUTPATIENT
Start: 2023-08-03

## 2023-08-04 RX ORDER — VALSARTAN 80 MG/1
80 TABLET ORAL DAILY
Qty: 30 TABLET | Refills: 0 | Status: SHIPPED | OUTPATIENT
Start: 2023-08-04

## 2023-08-16 ENCOUNTER — OFFICE (AMBULATORY)
Dept: URBAN - METROPOLITAN AREA CLINIC 64 | Facility: CLINIC | Age: 83
End: 2023-08-16

## 2023-08-16 VITALS
DIASTOLIC BLOOD PRESSURE: 77 MMHG | HEART RATE: 70 BPM | HEIGHT: 66 IN | WEIGHT: 185 LBS | SYSTOLIC BLOOD PRESSURE: 125 MMHG

## 2023-08-16 DIAGNOSIS — R74.8 ABNORMAL LEVELS OF OTHER SERUM ENZYMES: ICD-10-CM

## 2023-08-16 DIAGNOSIS — K64.8 OTHER HEMORRHOIDS: ICD-10-CM

## 2023-08-16 DIAGNOSIS — K59.00 CONSTIPATION, UNSPECIFIED: ICD-10-CM

## 2023-08-16 DIAGNOSIS — K62.5 HEMORRHAGE OF ANUS AND RECTUM: ICD-10-CM

## 2023-08-16 PROCEDURE — 46600 DIAGNOSTIC ANOSCOPY SPX: CPT

## 2023-08-16 PROCEDURE — 99214 OFFICE O/P EST MOD 30 MIN: CPT | Mod: 25

## 2023-08-16 RX ORDER — HYDROCORTISONE ACETATE 25 MG/1
25 SUPPOSITORY RECTAL
Qty: 10 | Refills: 1 | Status: COMPLETED
Start: 2023-08-16 | End: 2023-10-13

## 2023-08-25 ENCOUNTER — OFFICE VISIT (OUTPATIENT)
Dept: FAMILY MEDICINE CLINIC | Facility: CLINIC | Age: 83
End: 2023-08-25
Payer: MEDICARE

## 2023-08-25 VITALS
BODY MASS INDEX: 30.53 KG/M2 | HEIGHT: 66 IN | OXYGEN SATURATION: 98 % | TEMPERATURE: 98 F | HEART RATE: 66 BPM | DIASTOLIC BLOOD PRESSURE: 76 MMHG | SYSTOLIC BLOOD PRESSURE: 133 MMHG | WEIGHT: 190 LBS

## 2023-08-25 DIAGNOSIS — Z00.00 MEDICARE ANNUAL WELLNESS VISIT, SUBSEQUENT: Primary | ICD-10-CM

## 2023-08-25 DIAGNOSIS — E83.52 SERUM CALCIUM ELEVATED: ICD-10-CM

## 2023-08-25 DIAGNOSIS — I10 PRIMARY HYPERTENSION: ICD-10-CM

## 2023-08-25 DIAGNOSIS — R73.02 IGT (IMPAIRED GLUCOSE TOLERANCE): ICD-10-CM

## 2023-08-25 LAB
CA-I BLD-MCNC: 5.6 MG/DL (ref 4.6–5.4)
CA-I SERPL ISE-MCNC: 1.4 MMOL/L (ref 1.15–1.35)

## 2023-08-25 PROCEDURE — 82330 ASSAY OF CALCIUM: CPT | Performed by: FAMILY MEDICINE

## 2023-08-25 PROCEDURE — 36415 COLL VENOUS BLD VENIPUNCTURE: CPT | Performed by: FAMILY MEDICINE

## 2023-08-25 PROCEDURE — 3078F DIAST BP <80 MM HG: CPT | Performed by: FAMILY MEDICINE

## 2023-08-25 PROCEDURE — 80053 COMPREHEN METABOLIC PANEL: CPT | Performed by: FAMILY MEDICINE

## 2023-08-25 PROCEDURE — 83036 HEMOGLOBIN GLYCOSYLATED A1C: CPT | Performed by: FAMILY MEDICINE

## 2023-08-25 PROCEDURE — 3075F SYST BP GE 130 - 139MM HG: CPT | Performed by: FAMILY MEDICINE

## 2023-08-25 PROCEDURE — G0439 PPPS, SUBSEQ VISIT: HCPCS | Performed by: FAMILY MEDICINE

## 2023-08-25 PROCEDURE — 1170F FXNL STATUS ASSESSED: CPT | Performed by: FAMILY MEDICINE

## 2023-08-25 RX ORDER — OMEPRAZOLE 20 MG/1
CAPSULE, DELAYED RELEASE ORAL
COMMUNITY
Start: 2023-05-30 | End: 2023-08-25

## 2023-08-25 RX ORDER — VALSARTAN 160 MG/1
TABLET ORAL
COMMUNITY
Start: 2023-06-02

## 2023-08-25 RX ORDER — FAMOTIDINE 20 MG/1
20 TABLET, FILM COATED ORAL DAILY PRN
Start: 2023-08-25

## 2023-08-25 RX ORDER — PSYLLIUM HUSK 0.4 G
0.52 CAPSULE ORAL DAILY
Qty: 30 CAPSULE | Refills: 11
Start: 2023-08-25 | End: 2024-08-24

## 2023-08-25 NOTE — PROGRESS NOTES
Venipuncture performed in L ARM by RT Dede  with good hemostasis. Patient tolerated well. 08/25/23 Nilsa Greco, RT

## 2023-08-25 NOTE — PROGRESS NOTES
The ABCs of the Annual Wellness Visit  Subsequent Medicare Wellness Visit    Chief Complaint   Patient presents with    Medicare Wellness-subsequent       Subjective   History of Present Illness:  Kingsley Hunt is a 82 y.o. female who presents for a Subsequent Medicare Wellness Visit.    HEALTH RISK ASSESSMENT    Recent Hospitalizations:  Recently treated at the following:  [facility]     Current Medical Providers:  Patient Care Team:  Amanda Quinones DO as PCP - General (Family Medicine)  Marbin Lawrence MD as Consulting Physician (Cardiac Electrophysiology)  Dara Means MD as Consulting Physician (Gastroenterology)  Jake Glynn OD (Optometry)  Marbin Briseno MD as Consulting Physician (Dermatology)    Smoking Status:  Social History     Tobacco Use   Smoking Status Former    Packs/day: 1.00    Years: 10.00    Pack years: 10.00    Types: Cigarettes    Quit date:     Years since quittin.6    Passive exposure: Past   Smokeless Tobacco Never   Tobacco Comments    Quit 30+ years ago       Alcohol Consumption:  Social History     Substance and Sexual Activity   Alcohol Use Yes    Alcohol/week: 3.0 standard drinks    Types: 3 Glasses of wine per week    Comment: rare       Depression Screen:       2023    10:00 AM   PHQ-2/PHQ-9 Depression Screening   Little Interest or Pleasure in Doing Things 0-->not at all   Feeling Down, Depressed or Hopeless 0-->not at all   Trouble Falling or Staying Asleep, or Sleeping Too Much 0-->not at all   Feeling Tired or Having Little Energy 0-->not at all   Poor Appetite or Overeating 0-->not at all   Feeling Bad about Yourself - or that You are a Failure or Have Let Yourself or Your Family Down 0-->not at all   Trouble Concentrating on Things, Such as Reading the Newspaper or Watching Television 0-->not at all   Moving or Speaking So Slowly that Other People Could Have Noticed? Or the Opposite - Being So Fidgety 0-->not at all    Thoughts that You Would be Better Off Dead or of Hurting Yourself in Some Way 0-->not at all   PHQ-9: Brief Depression Severity Measure Score 0   If You Checked Off Any Problems, How Difficult Have These Problems Made It For You to Do Your Work, Take Care of Things at Home, or Get Along with Other People? not difficult at all       Fall Risk Screen:  VAIBHAV Fall Risk Assessment was completed, and patient is at LOW risk for falls.Assessment completed on:2023    Health Habits and Functional and Cognitive Screenin/25/2023    10:00 AM   Functional & Cognitive Status   Do you have difficulty preparing food and eating? No   Do you have difficulty bathing yourself, getting dressed or grooming yourself? No   Do you have difficulty using the toilet? No   Do you have difficulty moving around from place to place? No   Do you have trouble with steps or getting out of a bed or a chair? No   Current Diet Well Balanced Diet   Dental Exam Up to date   Eye Exam Up to date   Exercise (times per week) 0 times per week   Current Exercises Include Walking   Do you need help using the phone?  No   Are you deaf or do you have serious difficulty hearing?  No   Do you need help to go to places out of walking distance? No   Do you need help shopping? No   Do you need help preparing meals?  No   Do you need help with housework?  Yes   Do you need help with laundry? No   Do you need help taking your medications? No   Do you need help managing money? No   Do you ever drive or ride in a car without wearing a seat belt? Yes   Have you felt unusual stress, anger or loneliness in the last month? No   Who do you live with? Alone   If you need help, do you have trouble finding someone available to you? No   Have you been bothered in the last four weeks by sexual problems? No   Do you have difficulty concentrating, remembering or making decisions? No         Does the patient have evidence of cognitive impairment? No    Asprin use  counseling:Does not need ASA (and currently is not on it)    Age-appropriate Screening Schedule:  Refer to the list below for future screening recommendations based on patient's age, sex and/or medical conditions. Orders for these recommended tests are listed in the plan section. The patient has been provided with a written plan.    Health Maintenance   Topic Date Due    URINE MICROALBUMIN  Never done    DXA SCAN  Never done    COVID-19 Vaccine (1) Never done    TDAP/TD VACCINES (1 - Tdap) Never done    ZOSTER VACCINE (2 of 3) 02/24/2015    INFLUENZA VACCINE  10/01/2023    LIPID PANEL  10/21/2023    DIABETIC EYE EXAM  01/10/2024    HEMOGLOBIN A1C  02/25/2024    ANNUAL WELLNESS VISIT  08/25/2024    Pneumococcal Vaccine 65+  Completed          The following portions of the patient's history were reviewed and updated as appropriate: allergies, current medications, past family history, past medical history, past social history, past surgical history, and problem list.    Outpatient Medications Prior to Visit   Medication Sig Dispense Refill    Accu-Chek Guide test strip check BLOOD SUGAR DAILY 300 each 3    acebutolol (SECTRAL) 200 MG capsule Take 1 capsule by mouth Daily. 90 capsule 1    apixaban (ELIQUIS) 5 MG tablet tablet Take 1 tablet by mouth Every 12 (Twelve) Hours. 180 tablet 3    levothyroxine (SYNTHROID, LEVOTHROID) 50 MCG tablet Take 1 tablet by mouth Daily. 90 tablet 0    MAGNESIUM OXIDE PO 1 po qd (magnesium O7)      omeprazole (priLOSEC) 40 MG capsule Take 1 capsule by mouth Daily. 90 capsule 2    valsartan (DIOVAN) 160 MG tablet TAKE ONE (1) TABLET BY MOUTH EVERY DAY      metroNIDAZOLE (Flagyl) 500 MG tablet Take 1 tablet by mouth 3 (Three) Times a Day. (Patient not taking: Reported on 8/25/2023) 30 tablet 0    omeprazole (priLOSEC) 20 MG capsule TAKE ONE (1) CAPSULE BY MOUTH EVERY DAY (Patient not taking: Reported on 8/25/2023)      sucralfate (CARAFATE) 1 g tablet Take 1 tablet by mouth 4 (Four) Times a  "Day Before Meals & at Bedtime. (Patient not taking: Reported on 8/25/2023) 120 tablet 2    valsartan (Diovan) 80 MG tablet Take 1 tablet by mouth Daily. 30 tablet 0     No facility-administered medications prior to visit.       Patient Active Problem List   Diagnosis    Abdominal pain    Bradycardia    Heart murmur    Hyperlipidemia    Palpitations    Tachycardia-bradycardia    Chronic anticoagulation    Chronic obstructive pulmonary disease    Diverticulosis    Elbow pain    Elevated ferritin    Gastritis    Hiatal hernia    HFE-related hemochromatosis 1    Hemochromatosis    Hypertension    Hypothyroidism    Osteoarthritis    Permanent atrial fibrillation    Postnasal drip    Persistent atrial fibrillation    Serum calcium elevated    Blepharospasm    Accommodative insufficiency    Cholecystitis    GERD (gastroesophageal reflux disease)    Vaginal pain       Advanced Care Planning:  ACP discussion was held with the patient during this visit. Patient has an advance directive (not in EMR), copy requested.    Review of Systems    Compared to one year ago, the patient feels her physical health is better.  Compared to one year ago, the patient feels her mental health is worse.    Reviewed chart for potential of high risk medication in the elderly: yes  Reviewed chart for potential of harmful drug interactions in the elderly:yes    Objective         Vitals:    08/25/23 0948   BP: 133/76   Pulse: 66   Temp: 98 øF (36.7 øC)   TempSrc: Infrared   SpO2: 98%   Weight: 86.2 kg (190 lb)   Height: 167.6 cm (65.98\")       Body mass index is 30.68 kg/mý.  Discussed the patient's BMI with her. The BMI is in the acceptable range.    Physical Exam  Vitals and nursing note reviewed.   Constitutional:       General: She is not in acute distress.     Appearance: She is well-developed. She is not ill-appearing or toxic-appearing.   HENT:      Head: Normocephalic and atraumatic.   Cardiovascular:      Rate and Rhythm: Normal rate and " regular rhythm.      Heart sounds: Normal heart sounds. No murmur heard.  Pulmonary:      Effort: Pulmonary effort is normal. No respiratory distress.      Breath sounds: Normal breath sounds. No stridor. No wheezing, rhonchi or rales.   Skin:     General: Skin is warm and dry.      Findings: No rash.   Neurological:      Mental Status: She is alert and oriented to person, place, and time.      Cranial Nerves: No cranial nerve deficit.      Gait: Gait normal.   Psychiatric:         Mood and Affect: Mood normal.         Behavior: Behavior normal.         Thought Content: Thought content normal.         Judgment: Judgment normal.           Lab Results   Component Value Date    HGBA1C 6.40 (H) 08/25/2023        Assessment & Plan   Medicare Risks and Personalized Health Plan  CMS Preventative Services Quick Reference  Advance Directive Discussion  Breast Cancer/Mammogram Screening  Cardiovascular risk  Colon Cancer Screening  Dementia/Memory   Depression/Dysphoria  Diabetic Lab Screening   Fall Risk  Glaucoma Risk  Hearing Problem  Immunizations Discussed/Encouraged (specific immunizations; Influenza, Pneumococcal 23, Prevnar 20 (Pneumococcal 20-valent conjugate), Vaxneuvance (Pneumococcal 15-valent conjugate), Shingrix, and COVID19 )  Inadequate Social Support, Isolation, Loneliness, Lack of Transportation, Financial Difficulties, or Caregiver Stress   Inactivity/Sedentary  Lung Cancer Risk    The above risks/problems have been discussed with the patient.  Pertinent information has been shared with the patient in the After Visit Summary.  Follow up plans and orders are seen below in the Assessment/Plan Section.    Diagnoses and all orders for this visit:    1. Medicare annual wellness visit, subsequent (Primary)    2. IGT (impaired glucose tolerance)  -     Hemoglobin A1c    3. Primary hypertension  -     Comprehensive Metabolic Panel    4. Serum calcium elevated  -     Calcium, Ionized; Future  -     Calcium,  Ionized    Other orders  -     psyllium (Metamucil) 0.52 g capsule; Take 1 capsule by mouth Daily.  Dispense: 30 capsule; Refill: 11  -     famotidine (PEPCID) 20 MG tablet; Take 1 tablet by mouth Daily As Needed for Heartburn or Indigestion. Alternate w/ omeprazole      Follow Up:  No follow-ups on file.     An After Visit Summary and PPPS were given to the patient.

## 2023-08-26 DIAGNOSIS — E83.52 SERUM CALCIUM ELEVATED: Primary | ICD-10-CM

## 2023-08-26 LAB
ALBUMIN SERPL-MCNC: 4.2 G/DL (ref 3.5–5.2)
ALBUMIN/GLOB SERPL: 1.4 G/DL
ALP SERPL-CCNC: 92 U/L (ref 39–117)
ALT SERPL W P-5'-P-CCNC: 21 U/L (ref 1–33)
ANION GAP SERPL CALCULATED.3IONS-SCNC: 10.9 MMOL/L (ref 5–15)
AST SERPL-CCNC: 25 U/L (ref 1–32)
BILIRUB SERPL-MCNC: 0.5 MG/DL (ref 0–1.2)
BUN SERPL-MCNC: 19 MG/DL (ref 8–23)
BUN/CREAT SERPL: 21.8 (ref 7–25)
CALCIUM SPEC-SCNC: 10 MG/DL (ref 8.6–10.5)
CHLORIDE SERPL-SCNC: 99 MMOL/L (ref 98–107)
CO2 SERPL-SCNC: 27.1 MMOL/L (ref 22–29)
CREAT SERPL-MCNC: 0.87 MG/DL (ref 0.57–1)
EGFRCR SERPLBLD CKD-EPI 2021: 65.8 ML/MIN/1.73
GLOBULIN UR ELPH-MCNC: 3.1 GM/DL
GLUCOSE SERPL-MCNC: 90 MG/DL (ref 65–99)
HBA1C MFR BLD: 6.4 % (ref 4.8–5.6)
POTASSIUM SERPL-SCNC: 4.8 MMOL/L (ref 3.5–5.2)
PROT SERPL-MCNC: 7.3 G/DL (ref 6–8.5)
SODIUM SERPL-SCNC: 137 MMOL/L (ref 136–145)

## 2023-08-28 ENCOUNTER — TELEPHONE (OUTPATIENT)
Dept: FAMILY MEDICINE CLINIC | Facility: CLINIC | Age: 83
End: 2023-08-28
Payer: MEDICARE

## 2023-08-28 NOTE — TELEPHONE ENCOUNTER
HUB to share    ----- Message from Amanda Quinones DO sent at 8/26/2023  9:06 AM EDT -----  A1C=6.4-------stable sugar ----rech in 3-6mos  CMP good  Ionized calcium still elevated----f/u labs ordered for her to get done    LVM informing pt and to call for lab appt

## 2023-08-30 RX ORDER — VALSARTAN 80 MG/1
TABLET ORAL
Qty: 30 TABLET | Refills: 0 | OUTPATIENT
Start: 2023-08-30

## 2023-08-30 NOTE — TELEPHONE ENCOUNTER
Caller: Kingsley Hunt    Relationship: Self    Best call back number: [phone]    Requested Prescriptions:   Requested Prescriptions     Pending Prescriptions Disp Refills    valsartan (DIOVAN) 80 MG tablet [Pharmacy Med Name: valsartan 80 mg tablet] 30 tablet 0     Sig: TAKE ONE (1) TABLET BY MOUTH DAILY        Pharmacy where request should be sent: [place] [address], [place] [address] - [phone]  - [phone]     Last office visit with prescribing clinician: 8/25/2023   Last telemedicine visit with prescribing clinician: Visit date not found   Next office visit with prescribing clinician: 11/20/2023     Additional details provided by patient: OUT    Does the patient have less than a 3 day supply:  [x] Yes  [] No    Would you like a call back once the refill request has been completed: [x] Yes [] No    If the office needs to give you a call back, can they leave a voicemail: [x] Yes [] No    Bautista Ramos   08/30/23 15:35 EDT

## 2023-08-31 ENCOUNTER — TELEPHONE (OUTPATIENT)
Dept: FAMILY MEDICINE CLINIC | Facility: CLINIC | Age: 83
End: 2023-08-31
Payer: MEDICARE

## 2023-08-31 RX ORDER — VALSARTAN 160 MG/1
160 TABLET ORAL DAILY
Qty: 90 TABLET | Refills: 1 | Status: SHIPPED | OUTPATIENT
Start: 2023-08-31

## 2023-08-31 NOTE — TELEPHONE ENCOUNTER
Patient calling stating Valsartan refill keeps getting denied.  She would like someone to call her and let her know why because she is now out of medication.  Please advise.

## 2023-08-31 NOTE — TELEPHONE ENCOUNTER
Rx Refill Note  Requested Prescriptions      No prescriptions requested or ordered in this encounter      Last office visit with prescribing clinician: 8/25/2023   Last telemedicine visit with prescribing clinician: Visit date not found   Next office visit with prescribing clinician: 11/20/2023     Hemoglobin A1c (08/25/2023 11:07)   Comprehensive Metabolic Panel (06/16/2023 15:20)   CBC & Differential (06/16/2023 15:20)                     Would you like a call back once the refill request has been completed: [] Yes [] No    If the office needs to give you a call back, can they leave a voicemail: [] Yes [] No    Shi Dutton CMA  08/31/23, 14:57 EDT    Valsartan

## 2023-09-01 NOTE — TELEPHONE ENCOUNTER
PATIENT IS WANTING TO KNOW IF HER BLOOD PRESSURE RX FOR VALSARTAN IS CORRECT. SHE STATES SHE USUALLY TAKES 80 MG BUT WHEN SHE PICKED IT UP IT WAS 160MG. I READ OFF WHERE IT SHOWS AT THE LAST APPT IT WAS DISCONTINUED FOR 80MG AND CHANGED TO 160MG BUT PATIENT STATES DR TORRES NEVER TOLD HER THIS. PATIENT STATES SHE NEEDS TO KNOW IF THIS IS RIGHT ASAP AS HER PHARMACY IS CLOSED AFTER TODAY BECAUSE OF THE HOLIDAY. PLEASE ADVISE.

## 2023-09-18 ENCOUNTER — TELEPHONE (OUTPATIENT)
Dept: FAMILY MEDICINE CLINIC | Facility: CLINIC | Age: 83
End: 2023-09-18

## 2023-09-18 NOTE — TELEPHONE ENCOUNTER
Caller: Kingsley Hunt    Relationship: Self    Best call back number: [phone]*    What form or medical record are you requesting: MOST RECENT LAB RESULTS    Who is requesting this form or medical record from you: PATIENT    How would you like to receive the form or medical records (pick-up, mail, fax):     Timeframe paperwork needed: PATIENT WANTS TO  TODAY    Additional notes: PATIENT REQUEST A CALL WHEN READY TO

## 2023-10-03 RX ORDER — METRONIDAZOLE 500 MG/1
TABLET ORAL
Qty: 30 TABLET | Refills: 0 | OUTPATIENT
Start: 2023-10-03

## 2023-10-03 RX ORDER — CIPROFLOXACIN 500 MG/1
TABLET, FILM COATED ORAL
Qty: 20 TABLET | Refills: 0 | OUTPATIENT
Start: 2023-10-03

## 2023-10-03 NOTE — TELEPHONE ENCOUNTER
Rx Refill Note  Requested Prescriptions     Pending Prescriptions Disp Refills    metroNIDAZOLE (FLAGYL) 500 MG tablet [Pharmacy Med Name: metronidazole 500 mg tablet] 30 tablet 0     Sig: TAKE ONE (1) TABLET BY MOUTH THREE TIMES DAILY    ciprofloxacin (CIPRO) 500 MG tablet [Pharmacy Med Name: ciprofloxacin 500 mg tablet] 20 tablet 0     Sig: TAKE ONE (1) TABLET BY MOUTH TWICE DAILY FOR 10 DAYS      Last office visit with prescribing clinician: 8/25/2023   Last telemedicine visit with prescribing clinician: Visit date not found   Next office visit with prescribing clinician: 11/20/2023                         Would you like a call back once the refill request has been completed: [] Yes [] No    If the office needs to give you a call back, can they leave a voicemail: [] Yes [] No    Nilsa Greco, RT  10/03/23, 10:11 EDT

## 2023-10-04 ENCOUNTER — TELEPHONE (OUTPATIENT)
Dept: FAMILY MEDICINE CLINIC | Facility: CLINIC | Age: 83
End: 2023-10-04

## 2023-10-04 NOTE — TELEPHONE ENCOUNTER
"THE PATIENT CALLED BACK SAYING SHE COULDN'T WAIT 48> HOURS FOR A RESPONSE AND SAID TO \"CANCEL\" HER MESSAGE.  "

## 2023-10-04 NOTE — TELEPHONE ENCOUNTER
"  Caller: Kingsley Hunt    Relationship: Self    Best call back number:     Kingsley Hunt (Self) [phone] (Mobile)     What was the call regarding: PATIENT IS HAVING A DIVERTICULITIS ATTACK AND WANTED TO A \"DRIP\" IN OFFICE TO HELP     Is it okay if the provider responds through MyChart: CALL PLEASE / ASAP         "

## 2023-10-05 NOTE — TELEPHONE ENCOUNTER
PATIENT WOULD LIKE TO KNOW IF GOING TODAY (10/05) AROUND 4PM WOULD BE OK. PLEASE CALL PATIENT TO INFORM

## 2023-10-11 RX ORDER — ACEBUTOLOL HYDROCHLORIDE 200 MG/1
200 CAPSULE ORAL DAILY
Qty: 90 CAPSULE | Refills: 1 | Status: SHIPPED | OUTPATIENT
Start: 2023-10-11

## 2023-10-13 ENCOUNTER — OFFICE (AMBULATORY)
Dept: URBAN - METROPOLITAN AREA CLINIC 64 | Facility: CLINIC | Age: 83
End: 2023-10-13

## 2023-10-13 VITALS
HEART RATE: 61 BPM | WEIGHT: 188 LBS | HEIGHT: 66 IN | SYSTOLIC BLOOD PRESSURE: 113 MMHG | DIASTOLIC BLOOD PRESSURE: 71 MMHG

## 2023-10-13 DIAGNOSIS — K57.32 DIVERTICULITIS OF LARGE INTESTINE WITHOUT PERFORATION OR ABS: ICD-10-CM

## 2023-10-13 PROCEDURE — 99213 OFFICE O/P EST LOW 20 MIN: CPT | Performed by: INTERNAL MEDICINE

## 2023-10-25 RX ORDER — ACEBUTOLOL HYDROCHLORIDE 200 MG/1
CAPSULE ORAL
Qty: 90 CAPSULE | Refills: 1 | Status: SHIPPED | OUTPATIENT
Start: 2023-10-25

## 2023-10-25 NOTE — TELEPHONE ENCOUNTER
Rx Refill Note  Requested Prescriptions     Pending Prescriptions Disp Refills    acebutolol (SECTRAL) 200 MG capsule [Pharmacy Med Name: acebutolol 200 mg capsule] 90 capsule 1     Sig: TAKE ONE (1) CAPSULE BY MOUTH DAILY      Last office visit with prescribing clinician: 8/25/2023   Last telemedicine visit with prescribing clinician: Visit date not found   Next office visit with prescribing clinician: Visit date not found                         Would you like a call back once the refill request has been completed: [] Yes [] No    If the office needs to give you a call back, can they leave a voicemail: [] Yes [] No    Shi Dutton CMA  10/25/23, 08:00 EDT

## 2023-10-30 ENCOUNTER — OFFICE VISIT (OUTPATIENT)
Dept: SURGERY | Facility: CLINIC | Age: 83
End: 2023-10-30
Payer: MEDICARE

## 2023-10-30 VITALS
SYSTOLIC BLOOD PRESSURE: 129 MMHG | HEART RATE: 70 BPM | BODY MASS INDEX: 30.15 KG/M2 | HEIGHT: 66 IN | DIASTOLIC BLOOD PRESSURE: 80 MMHG | OXYGEN SATURATION: 95 % | WEIGHT: 187.6 LBS | TEMPERATURE: 97.7 F

## 2023-10-30 DIAGNOSIS — K57.32 DIVERTICULITIS OF LARGE INTESTINE WITHOUT PERFORATION OR ABSCESS WITHOUT BLEEDING: Primary | ICD-10-CM

## 2023-10-30 PROCEDURE — 1160F RVW MEDS BY RX/DR IN RCRD: CPT | Performed by: SURGERY

## 2023-10-30 PROCEDURE — 3079F DIAST BP 80-89 MM HG: CPT | Performed by: SURGERY

## 2023-10-30 PROCEDURE — 1159F MED LIST DOCD IN RCRD: CPT | Performed by: SURGERY

## 2023-10-30 PROCEDURE — 3074F SYST BP LT 130 MM HG: CPT | Performed by: SURGERY

## 2023-10-30 PROCEDURE — 99214 OFFICE O/P EST MOD 30 MIN: CPT | Performed by: SURGERY

## 2023-10-30 RX ORDER — MELATONIN
1000 DAILY
COMMUNITY

## 2023-10-30 NOTE — PROGRESS NOTES
General Surgery History and Physical      Referring Provider: Dara Means MD    Chief Complaint:    Recurrent diverticulitis    History of Present Illness:    Kingsley Hunt is a 83 y.o. with a history of recurrent diverticulitis presents today for evaluation and discussion regarding possible colectomy.  Upon chart review she does have a CT scan from 2016 showing sigmoid diverticulitis.  She believes this was probably her first attack.  Since that time she has had between 1-2 attacks a year and this year she has had at least 3 attacks.  Each time she has been able to manage with antibiotics with relatively quick resolution.  She reports when she has flares she has severe pelvic and right lower quadrant pain.  She does report that more recently she has had to be more aggressive with bowel regimen and feels more constipated.  Last colonoscopy was several years ago and she reports she was told she may not have to have another.    Past Medical History:   Past Medical History:   Diagnosis Date    Arthritis     BCC     Nose    CHOL     Diverticulosis     DMII     GERD     HTN     Hypothyroid     Atrial fibrillation    Past Surgical History:    Past Surgical History:   Procedure Laterality Date    APPENDECTOMY      CATARACT EXTRACTION WITH INTRAOCULAR LENS IMPLANT Bilateral     CHOLECYSTECTOMY N/A 04/11/2023    Procedure: CHOLECYSTECTOMY LAPAROSCOPIC WITH DAVINCI ROBOT;  Surgeon: Sondra Munoz MD;  [facility];  Service: Robotics - DaVinci;  Laterality: N/A;    COLONOSCOPY      ENDOSCOPY      SKIN BIOPSY  2019    Nose    SKIN CANCER EXCISION  2023    WRIST FRACTURE SURGERY Left 2003     Family History:    Family History   Problem Relation Age of Onset    Brain cancer Mother     COPD Father     Diabetes Sister     Heart disease Sister     No Known Problems Sister     Heart disease Brother     No Known Problems Brother     Diabetes Son      Social History:    Social History     Socioeconomic History     Marital status:    Tobacco Use    Smoking status: Former     Packs/day: 1.00     Years: 10.00     Additional pack years: 0.00     Total pack years: 10.00     Types: Cigarettes     Quit date:      Years since quittin.8     Passive exposure: Past    Smokeless tobacco: Never    Tobacco comments:     Quit 30+ years ago   Vaping Use    Vaping Use: Never used   Substance and Sexual Activity    Alcohol use: Yes     Alcohol/week: 3.0 standard drinks of alcohol     Types: 3 Glasses of wine per week     Comment: rare    Drug use: No    Sexual activity: Defer     Allergies:   No Known Allergies    Medications:     Current Outpatient Medications:     Accu-Chek Guide test strip, check BLOOD SUGAR DAILY, Disp: 300 each, Rfl: 3    acebutolol (SECTRAL) 200 MG capsule, TAKE ONE (1) CAPSULE BY MOUTH DAILY, Disp: 90 capsule, Rfl: 1    apixaban (ELIQUIS) 5 MG tablet tablet, Take 1 tablet by mouth Every 12 (Twelve) Hours., Disp: 180 tablet, Rfl: 3    Cholecalciferol 25 MCG (1000 UT) tablet, Take 1 tablet by mouth Daily., Disp: , Rfl:     hydrocortisone 2.5 % ointment, Apply 1 application  topically to the appropriate area as directed., Disp: , Rfl:     levothyroxine (SYNTHROID, LEVOTHROID) 50 MCG tablet, Take 1 tablet by mouth Daily., Disp: 90 tablet, Rfl: 0    MAGNESIUM OXIDE PO, 1 po qd (magnesium O7), Disp: , Rfl:     omeprazole (priLOSEC) 40 MG capsule, Take 1 capsule by mouth Daily., Disp: 90 capsule, Rfl: 2    valsartan (DIOVAN) 160 MG tablet, Take 1 tablet by mouth Daily., Disp: 90 tablet, Rfl: 1    VITAMINS B1 B6 B12 PO, Take  by mouth. Liquid daily, Disp: , Rfl:     Wheat Dextrin (BENEFIBER DRINK MIX PO), Take 1 dose by mouth Daily., Disp: , Rfl:     Zinc Sulfate (ZINC 15 PO), Take 1 tablet by mouth Daily., Disp: , Rfl:     Radiology/Endoscopy:    CT A/P 2018  IMPRESSION:  1. There is diverticulitis involving the mid sigmoid colon.  There is  thickening of the wall of the the mid sigmoid colon and there is  moderate  inflammatory change in the surrounding fat.  No abscess is identified.    Labs:    Recent labs reviewed    Review of Systems:   As noted above in HPI    Physical Exam:   No acute distress, alert  Nonlabored respirations  Abdomen soft, nontender, nondistended  Extremities with no gross deformities    Assessment and Plan:  Kingsley Hunt is a 83 y.o. with recurrent diverticulitis.  Recent flare treated with a course of oral antibiotics and with resolution of symptoms.    - Discussed with patient given frequent recurrence it would be reasonable to consider elective colon resection  - The procedure along with associated risk, benefits, alternatives were discussed with the patient; risk discussed included but were not limited to bleeding, infection, conversion to open, anastomotic leak, possible ostomy, possible hernia, possible ureteral injury  - Patient expressed understanding of everything however she is scared to have surgery and would like to think about it  - We did discuss her change in bowel habits may be related to chronic inflammation as well as possible stricture  - Patient to call to schedule if she would like to proceed with surgery or will follow-up in clinic to further discuss  - We will order CT of the abdomen/pelvis to further evaluate at this time and to see if there is persistent inflammation    Sondra Munoz MD  General Surgery

## 2023-12-04 ENCOUNTER — TELEPHONE (OUTPATIENT)
Dept: SURGERY | Facility: CLINIC | Age: 83
End: 2023-12-04
Payer: MEDICARE

## 2023-12-04 NOTE — TELEPHONE ENCOUNTER
Call placed to patient, left message on machine advising that we were calling to follow up on the CT abd pelvis that was ordered at her office visit with Dr. Munoz on 10/30/2023. Advised we did see that had been cancelled. Wanted to see if she was planning on rescheduling or if there was anything we could help her with. Advised to call back to let us know.

## 2024-04-26 ENCOUNTER — OFFICE (AMBULATORY)
Dept: URBAN - METROPOLITAN AREA CLINIC 64 | Facility: CLINIC | Age: 84
End: 2024-04-26

## 2024-04-26 VITALS
WEIGHT: 186 LBS | HEIGHT: 66 IN | HEART RATE: 50 BPM | DIASTOLIC BLOOD PRESSURE: 65 MMHG | SYSTOLIC BLOOD PRESSURE: 119 MMHG

## 2024-04-26 DIAGNOSIS — K57.32 DIVERTICULITIS OF LARGE INTESTINE WITHOUT PERFORATION OR ABS: ICD-10-CM

## 2024-04-26 PROCEDURE — 99213 OFFICE O/P EST LOW 20 MIN: CPT | Performed by: INTERNAL MEDICINE

## 2024-05-30 ENCOUNTER — TRANSCRIBE ORDERS (OUTPATIENT)
Dept: ADMINISTRATIVE | Facility: HOSPITAL | Age: 84
End: 2024-05-30
Payer: MEDICARE

## 2024-05-30 DIAGNOSIS — K31.84 SLOW GASTRIC MOTILITY: Primary | ICD-10-CM

## 2024-08-07 ENCOUNTER — OFFICE (AMBULATORY)
Age: 84
End: 2024-08-07
Payer: COMMERCIAL

## 2024-08-07 ENCOUNTER — OFFICE (AMBULATORY)
Dept: URBAN - METROPOLITAN AREA PATHOLOGY 19 | Facility: PATHOLOGY | Age: 84
End: 2024-08-07
Payer: COMMERCIAL

## 2024-08-07 ENCOUNTER — OFFICE (AMBULATORY)
Dept: URBAN - METROPOLITAN AREA PATHOLOGY 19 | Facility: PATHOLOGY | Age: 84
End: 2024-08-07
Payer: MEDICARE

## 2024-08-07 ENCOUNTER — ON CAMPUS - OUTPATIENT (AMBULATORY)
Dept: URBAN - METROPOLITAN AREA HOSPITAL 2 | Facility: HOSPITAL | Age: 84
End: 2024-08-07
Payer: MEDICARE

## 2024-08-07 VITALS
SYSTOLIC BLOOD PRESSURE: 85 MMHG | HEART RATE: 71 BPM | SYSTOLIC BLOOD PRESSURE: 110 MMHG | RESPIRATION RATE: 17 BRPM | SYSTOLIC BLOOD PRESSURE: 82 MMHG | DIASTOLIC BLOOD PRESSURE: 49 MMHG | HEART RATE: 63 BPM | DIASTOLIC BLOOD PRESSURE: 65 MMHG | DIASTOLIC BLOOD PRESSURE: 53 MMHG | OXYGEN SATURATION: 99 % | DIASTOLIC BLOOD PRESSURE: 46 MMHG | DIASTOLIC BLOOD PRESSURE: 64 MMHG | OXYGEN SATURATION: 95 % | DIASTOLIC BLOOD PRESSURE: 44 MMHG | HEART RATE: 60 BPM | SYSTOLIC BLOOD PRESSURE: 83 MMHG | DIASTOLIC BLOOD PRESSURE: 79 MMHG | HEART RATE: 64 BPM | SYSTOLIC BLOOD PRESSURE: 151 MMHG | SYSTOLIC BLOOD PRESSURE: 84 MMHG | OXYGEN SATURATION: 96 % | HEIGHT: 66 IN | RESPIRATION RATE: 16 BRPM | HEART RATE: 70 BPM | OXYGEN SATURATION: 98 % | HEART RATE: 75 BPM | SYSTOLIC BLOOD PRESSURE: 120 MMHG | OXYGEN SATURATION: 97 % | DIASTOLIC BLOOD PRESSURE: 43 MMHG | DIASTOLIC BLOOD PRESSURE: 96 MMHG | DIASTOLIC BLOOD PRESSURE: 69 MMHG | DIASTOLIC BLOOD PRESSURE: 45 MMHG | TEMPERATURE: 97.1 F | SYSTOLIC BLOOD PRESSURE: 90 MMHG | HEART RATE: 66 BPM | WEIGHT: 186 LBS

## 2024-08-07 DIAGNOSIS — K63.89 OTHER SPECIFIED DISEASES OF INTESTINE: ICD-10-CM

## 2024-08-07 DIAGNOSIS — K31.89 OTHER DISEASES OF STOMACH AND DUODENUM: ICD-10-CM

## 2024-08-07 DIAGNOSIS — K63.5 POLYP OF COLON: ICD-10-CM

## 2024-08-07 DIAGNOSIS — R13.10 DYSPHAGIA, UNSPECIFIED: ICD-10-CM

## 2024-08-07 DIAGNOSIS — K57.30 DIVERTICULOSIS OF LARGE INTESTINE WITHOUT PERFORATION OR ABS: ICD-10-CM

## 2024-08-07 DIAGNOSIS — R10.13 EPIGASTRIC PAIN: ICD-10-CM

## 2024-08-07 DIAGNOSIS — K29.50 UNSPECIFIED CHRONIC GASTRITIS WITHOUT BLEEDING: ICD-10-CM

## 2024-08-07 DIAGNOSIS — K57.32 DIVERTICULITIS OF LARGE INTESTINE WITHOUT PERFORATION OR ABS: ICD-10-CM

## 2024-08-07 LAB
GI HISTOLOGY: A. STOMACH ANTRUM: (no result)
GI HISTOLOGY: B. HEPATIC FLEXURE: (no result)
GI HISTOLOGY: C. TRANSVERSE COLON: (no result)
GI HISTOLOGY: D. SIGMOID COLON: (no result)
GI HISTOLOGY: PDF REPORT: (no result)

## 2024-08-07 PROCEDURE — 43239 EGD BIOPSY SINGLE/MULTIPLE: CPT | Mod: 59 | Performed by: INTERNAL MEDICINE

## 2024-08-07 PROCEDURE — 88305 TISSUE EXAM BY PATHOLOGIST: CPT | Mod: 26 | Performed by: PATHOLOGY

## 2024-08-07 PROCEDURE — 45385 COLONOSCOPY W/LESION REMOVAL: CPT | Performed by: INTERNAL MEDICINE

## 2024-08-07 PROCEDURE — 88342 IMHCHEM/IMCYTCHM 1ST ANTB: CPT | Mod: 26 | Performed by: PATHOLOGY

## 2024-08-07 PROCEDURE — 43249 ESOPH EGD DILATION <30 MM: CPT | Performed by: INTERNAL MEDICINE

## (undated) DEVICE — CFF SCD HEMFRCE SEQ CLF STD XL

## (undated) DEVICE — CANNULA SEAL

## (undated) DEVICE — ENDOPATH PNEUMONEEDLE INSUFFLATION NEEDLES WITH LUER LOCK CONNECTORS 120MM: Brand: ENDOPATH

## (undated) DEVICE — ARM DRAPE

## (undated) DEVICE — SYR LUERLOK 30CC

## (undated) DEVICE — ENDOPATH PNEUMONEEDLE INSUFFLATION NEEDLES WITH LUER LOCK CONNECTORS 150MM: Brand: ENDOPATH

## (undated) DEVICE — SOLUTION,WATER,IRRIGATION,1000ML,STERILE: Brand: MEDLINE

## (undated) DEVICE — BLADELESS OBTURATOR: Brand: WECK VISTA

## (undated) DEVICE — GAUZE,SPONGE,4"X4",16PLY,XRAY,STRL,LF: Brand: MEDLINE

## (undated) DEVICE — ANTIBACTERIAL UNDYED BRAIDED (POLYGLACTIN 910), SYNTHETIC ABSORBABLE SUTURE: Brand: COATED VICRYL

## (undated) DEVICE — SEAL

## (undated) DEVICE — SOL IRRIG NACL 1000ML

## (undated) DEVICE — SLV SCD CALF HEMOFORCE DVT THERP REPROC MD

## (undated) DEVICE — COLUMN DRAPE

## (undated) DEVICE — BG RETRV TISS SUPERBAG INTRO RIP/STOP NLY 10MM 240ML MD

## (undated) DEVICE — GLV SURG SENSICARE SLT PF LF 6 STRL

## (undated) DEVICE — CVR HNDL LT SURG ACCSSRY BLU STRL

## (undated) DEVICE — GENERAL LAPAROSCOPY CDS: Brand: MEDLINE INDUSTRIES, INC.

## (undated) DEVICE — 9165 UNIVERSAL PATIENT PLATE: Brand: 3M™

## (undated) DEVICE — PASS SUT PRO BARIATRIC XL W/TROC SWABS

## (undated) DEVICE — REDUCER: Brand: ENDOWRIST

## (undated) DEVICE — TBG INSUFF MALE L/L W 12MM CON: Brand: MEDLINE INDUSTRIES, INC.

## (undated) DEVICE — BG RETRV TISS SUPERBAG INTRO RIP/STOP NLY 5/7MM 140ML MD

## (undated) DEVICE — KT SURG TURNOVER 050

## (undated) DEVICE — GLV SURG SENSICARE POLYISPRN W/ALOE PF LF 6.5 GRN STRL

## (undated) DEVICE — SUT VIC 0 UR6 27IN VCP603H

## (undated) DEVICE — BLANKT WARM UPPR/BDY ARM/OUT 57X196CM

## (undated) DEVICE — ENDOPATH XCEL BLADELESS TROCARS WITH STABILITY SLEEVES: Brand: ENDOPATH XCEL

## (undated) DEVICE — LAPAROVUE VISIBILITY SYSTEM LAPAROSCOPIC SOLUTIONS: Brand: LAPAROVUE